# Patient Record
Sex: MALE | Race: WHITE | Employment: OTHER | URBAN - METROPOLITAN AREA
[De-identification: names, ages, dates, MRNs, and addresses within clinical notes are randomized per-mention and may not be internally consistent; named-entity substitution may affect disease eponyms.]

---

## 2017-01-03 ENCOUNTER — OFFICE VISIT (OUTPATIENT)
Dept: RHEUMATOLOGY | Facility: CLINIC | Age: 77
End: 2017-01-03

## 2017-01-03 VITALS
DIASTOLIC BLOOD PRESSURE: 67 MMHG | BODY MASS INDEX: 25.18 KG/M2 | HEART RATE: 81 BPM | HEIGHT: 69 IN | WEIGHT: 170 LBS | SYSTOLIC BLOOD PRESSURE: 116 MMHG

## 2017-01-03 DIAGNOSIS — Z51.81 ENCOUNTER FOR THERAPEUTIC DRUG MONITORING: ICD-10-CM

## 2017-01-03 DIAGNOSIS — M05.79 SEROPOSITIVE RHEUMATOID ARTHRITIS OF MULTIPLE SITES (HCC): Primary | ICD-10-CM

## 2017-01-03 DIAGNOSIS — A04.72 C. DIFFICILE ENTERITIS: ICD-10-CM

## 2017-01-03 PROCEDURE — 99214 OFFICE O/P EST MOD 30 MIN: CPT | Performed by: INTERNAL MEDICINE

## 2017-01-03 PROCEDURE — G0463 HOSPITAL OUTPT CLINIC VISIT: HCPCS | Performed by: INTERNAL MEDICINE

## 2017-01-03 RX ORDER — PREDNISONE 1 MG/1
TABLET ORAL
Qty: 180 TABLET | Refills: 3 | COMMUNITY
Start: 2017-01-03 | End: 2017-01-03

## 2017-01-03 RX ORDER — PREDNISONE 1 MG/1
TABLET ORAL
Qty: 180 TABLET | Refills: 3 | Status: SHIPPED | OUTPATIENT
Start: 2017-01-03 | End: 2018-03-17

## 2017-01-03 NOTE — PROGRESS NOTES
HPI:    Patient ID: Angelina Pope is a 64year old male. HPI Comments: Amilcar Terry is a 77-year-old patient of Dr. Reece Denton with RF positive rheumatoid arthritis.  Since I saw him October 5th of 2016 he has remained on methotrexate 5 mg weekly and 2 mg of mg total) by mouth daily. Disp: 30 tablet Rfl: 11   FLUOXETINE HCL 10 MG Oral Cap TAKE 1 CAPSULE BY MOUTH EVERY DAY Disp: 90 capsule Rfl: 3   OCUVITE (OCUVITE) Oral Tab Take  by mouth.  take 1 by Oral route  every day Disp:  Rfl:    Atorvastatin Calcium (LI lower than usual.   4. Anemia. As above. Stable.           Meds This Visit:  No prescriptions requested or ordered in this encounter       Imaging & Referrals:  None       #[id number]

## 2017-01-12 ENCOUNTER — LAB ENCOUNTER (OUTPATIENT)
Dept: LAB | Age: 77
End: 2017-01-12
Attending: INTERNAL MEDICINE
Payer: MEDICARE

## 2017-01-12 ENCOUNTER — MOBILE ENCOUNTER (OUTPATIENT)
Dept: INTERNAL MEDICINE CLINIC | Facility: CLINIC | Age: 77
End: 2017-01-12

## 2017-01-12 ENCOUNTER — OFFICE VISIT (OUTPATIENT)
Dept: INTERNAL MEDICINE CLINIC | Facility: CLINIC | Age: 77
End: 2017-01-12

## 2017-01-12 ENCOUNTER — ANTI-COAG VISIT (OUTPATIENT)
Dept: INTERNAL MEDICINE CLINIC | Facility: CLINIC | Age: 77
End: 2017-01-12

## 2017-01-12 VITALS
BODY MASS INDEX: 25 KG/M2 | OXYGEN SATURATION: 98 % | HEART RATE: 68 BPM | SYSTOLIC BLOOD PRESSURE: 120 MMHG | TEMPERATURE: 98 F | WEIGHT: 168 LBS | DIASTOLIC BLOOD PRESSURE: 70 MMHG

## 2017-01-12 DIAGNOSIS — M05.79 SEROPOSITIVE RHEUMATOID ARTHRITIS OF MULTIPLE SITES (HCC): ICD-10-CM

## 2017-01-12 DIAGNOSIS — N28.9 RENAL INSUFFICIENCY: ICD-10-CM

## 2017-01-12 DIAGNOSIS — I10 ESSENTIAL HYPERTENSION: ICD-10-CM

## 2017-01-12 DIAGNOSIS — Z95.810 ICD (IMPLANTABLE CARDIOVERTER-DEFIBRILLATOR) IN PLACE: ICD-10-CM

## 2017-01-12 DIAGNOSIS — I42.9 CARDIOMYOPATHY (HCC): ICD-10-CM

## 2017-01-12 DIAGNOSIS — D69.6 THROMBOCYTOPENIA (HCC): ICD-10-CM

## 2017-01-12 DIAGNOSIS — R53.83 FATIGUE, UNSPECIFIED TYPE: ICD-10-CM

## 2017-01-12 DIAGNOSIS — E78.00 HYPERCHOLESTEREMIA: ICD-10-CM

## 2017-01-12 DIAGNOSIS — Z79.01 ANTICOAGULATED ON COUMADIN: ICD-10-CM

## 2017-01-12 DIAGNOSIS — A04.72 C. DIFFICILE ENTERITIS: ICD-10-CM

## 2017-01-12 DIAGNOSIS — I42.9 CARDIOMYOPATHY, UNSPECIFIED TYPE (HCC): Primary | ICD-10-CM

## 2017-01-12 LAB
ANION GAP SERPL CALC-SCNC: 8 MMOL/L (ref 0–18)
BASOPHILS # BLD: 0 K/UL (ref 0–0.2)
BASOPHILS NFR BLD: 1 %
BUN SERPL-MCNC: 23 MG/DL (ref 8–20)
BUN/CREAT SERPL: 16.3 (ref 10–20)
CALCIUM SERPL-MCNC: 9 MG/DL (ref 8.5–10.5)
CHLORIDE SERPL-SCNC: 104 MMOL/L (ref 95–110)
CO2 SERPL-SCNC: 26 MMOL/L (ref 22–32)
CREAT SERPL-MCNC: 1.41 MG/DL (ref 0.5–1.5)
EOSINOPHIL # BLD: 0.1 K/UL (ref 0–0.7)
EOSINOPHIL NFR BLD: 1 %
ERYTHROCYTE [DISTWIDTH] IN BLOOD BY AUTOMATED COUNT: 15.6 % (ref 11–15)
GLUCOSE SERPL-MCNC: 90 MG/DL (ref 70–99)
HCT VFR BLD AUTO: 38 % (ref 41–52)
HGB BLD-MCNC: 12.8 G/DL (ref 13.5–17.5)
INR BLD: 9.2 (ref 0.9–1.2)
LYMPHOCYTES # BLD: 0.5 K/UL (ref 1–4)
LYMPHOCYTES NFR BLD: 8 %
MCH RBC QN AUTO: 31.4 PG (ref 27–32)
MCHC RBC AUTO-ENTMCNC: 33.7 G/DL (ref 32–37)
MCV RBC AUTO: 93.3 FL (ref 80–100)
MONOCYTES # BLD: 0.7 K/UL (ref 0–1)
MONOCYTES NFR BLD: 11 %
NEUTROPHILS # BLD AUTO: 5.2 K/UL (ref 1.8–7.7)
NEUTROPHILS NFR BLD: 80 %
OSMOLALITY UR CALC.SUM OF ELEC: 289 MOSM/KG (ref 275–295)
PLATELET # BLD AUTO: 134 K/UL (ref 140–400)
PMV BLD AUTO: 8.8 FL (ref 7.4–10.3)
POTASSIUM SERPL-SCNC: 4.9 MMOL/L (ref 3.3–5.1)
PROTHROMBIN TIME: 75.6 SECONDS (ref 11.8–14.5)
RBC # BLD AUTO: 4.07 M/UL (ref 4.5–5.9)
SODIUM SERPL-SCNC: 138 MMOL/L (ref 136–144)
WBC # BLD AUTO: 6.5 K/UL (ref 4–11)

## 2017-01-12 PROCEDURE — 99214 OFFICE O/P EST MOD 30 MIN: CPT | Performed by: INTERNAL MEDICINE

## 2017-01-12 PROCEDURE — 85610 PROTHROMBIN TIME: CPT

## 2017-01-12 PROCEDURE — G0463 HOSPITAL OUTPT CLINIC VISIT: HCPCS | Performed by: INTERNAL MEDICINE

## 2017-01-12 PROCEDURE — 36415 COLL VENOUS BLD VENIPUNCTURE: CPT

## 2017-01-12 PROCEDURE — 85025 COMPLETE CBC W/AUTO DIFF WBC: CPT

## 2017-01-12 PROCEDURE — 80048 BASIC METABOLIC PNL TOTAL CA: CPT

## 2017-01-12 NOTE — PATIENT INSTRUCTIONS
1.  Patient is to continue his current diet, medications and activity. 2.  Patient is to have an INR tonight is recommended by the [facility], [facility].   3.  Patient is to be in contact with [facility] in the [facility] regarding his INR and C

## 2017-01-12 NOTE — PROGRESS NOTES
Ann Holley is a 64year old male. Patient presents with:  Checkup: 2 days in [facility] over [place] - dx: c.diff, allergic reaction to losartan - d/c's losartan, replaced with hydralazine. Diarrhea has resolved.  Should losartan be added to allergy list? R total) by mouth daily. Disp: 30 tablet Rfl: 11   FLUOXETINE HCL 10 MG Oral Cap TAKE 1 CAPSULE BY MOUTH EVERY DAY Disp: 90 capsule Rfl: 3   OCUVITE (OCUVITE) Oral Tab Take  by mouth.  take 1 by Oral route  every day Disp:  Rfl:    Atorvastatin Calcium (LIPIT Eyes are normal  NECK: supple,no lymphadenopathy or masses, no bruits  CHEST: Will develop male.   LUNGS: clear to auscultation  CARDIO: RRR, normal S1S2, without murmur   GI:Protuberant, BS are present, no organomegaly or palpable masses  EXTREMITIES: no e

## 2017-01-13 ENCOUNTER — TELEPHONE (OUTPATIENT)
Dept: INTERNAL MEDICINE CLINIC | Facility: CLINIC | Age: 77
End: 2017-01-13

## 2017-01-13 DIAGNOSIS — N18.9 ANEMIA IN CHRONIC KIDNEY DISEASE(285.21): Primary | ICD-10-CM

## 2017-01-13 DIAGNOSIS — D63.1 ANEMIA IN CHRONIC KIDNEY DISEASE(285.21): Primary | ICD-10-CM

## 2017-01-17 ENCOUNTER — ANTI-COAG VISIT (OUTPATIENT)
Dept: INTERNAL MEDICINE CLINIC | Facility: CLINIC | Age: 77
End: 2017-01-17

## 2017-01-17 DIAGNOSIS — I42.9 CARDIOMYOPATHY (HCC): ICD-10-CM

## 2017-01-17 LAB — INR: 1.7 (ref 0.8–1.2)

## 2017-01-17 PROCEDURE — G0463 HOSPITAL OUTPT CLINIC VISIT: HCPCS

## 2017-01-17 PROCEDURE — 36416 COLLJ CAPILLARY BLOOD SPEC: CPT

## 2017-01-17 PROCEDURE — 85610 PROTHROMBIN TIME: CPT

## 2017-01-24 ENCOUNTER — PATIENT MESSAGE (OUTPATIENT)
Dept: INTERNAL MEDICINE CLINIC | Facility: CLINIC | Age: 77
End: 2017-01-24

## 2017-01-25 NOTE — TELEPHONE ENCOUNTER
From: Frederick Cabrera  To: Jeannine Aguirre MD  Sent: 1/24/2017 10:16 AM CST  Subject: Other    Hi Dr. Carl Phelan,    My Dad's (Tay Edwards) eye doctor (Dr. Drake Hoyos) wants to do an eye angiogram in my dads eye to check his macular degeneration.  I believe th

## 2017-01-26 RX ORDER — HYDRALAZINE HYDROCHLORIDE 25 MG/1
25 TABLET, FILM COATED ORAL 2 TIMES DAILY
Qty: 60 TABLET | Refills: 11 | Status: SHIPPED | OUTPATIENT
Start: 2017-01-26 | End: 2018-02-07

## 2017-01-27 NOTE — TELEPHONE ENCOUNTER
Noted.  I have approved this medication and sent into the patient's pharmacy. Please notify the patient this is been done. I will route this to nursing.   Thank you!!

## 2017-01-27 NOTE — TELEPHONE ENCOUNTER
Discussed with patient's daughter. I believe they can proceed with the angiogram of his eyes which I think is going to be a fluorescein angiogram that ophthalmologist often performed. He should be able to tolerate this.   Daughter is indicated that after

## 2017-02-01 ENCOUNTER — ANTI-COAG VISIT (OUTPATIENT)
Dept: INTERNAL MEDICINE CLINIC | Facility: CLINIC | Age: 77
End: 2017-02-01

## 2017-02-01 DIAGNOSIS — I42.9 CARDIOMYOPATHY (HCC): ICD-10-CM

## 2017-02-01 LAB
INR: 3 (ref 0.8–1.2)
TEST STRIP LOT #: ABNORMAL NUMERIC

## 2017-02-01 PROCEDURE — 85610 PROTHROMBIN TIME: CPT

## 2017-02-01 PROCEDURE — G0463 HOSPITAL OUTPT CLINIC VISIT: HCPCS

## 2017-02-01 PROCEDURE — 36416 COLLJ CAPILLARY BLOOD SPEC: CPT

## 2017-02-07 ENCOUNTER — PATIENT MESSAGE (OUTPATIENT)
Dept: INTERNAL MEDICINE CLINIC | Facility: CLINIC | Age: 77
End: 2017-02-07

## 2017-02-09 NOTE — TELEPHONE ENCOUNTER
From: Dominique Edouard  Sent: 2/9/2017 2:59 PM CST  To: [facility]  Subject: RE: Coumadin visit    Thanks Yair Lin.  I saw the note you gave him and got him squared away.     ----- Message -----  From: Kirstin Kolb, PharmD  Sent: 2/7/17, 9:26

## 2017-02-10 ENCOUNTER — LAB ENCOUNTER (OUTPATIENT)
Dept: LAB | Age: 77
End: 2017-02-10
Attending: INTERNAL MEDICINE
Payer: MEDICARE

## 2017-02-10 DIAGNOSIS — N18.9 ANEMIA IN CHRONIC KIDNEY DISEASE(285.21): ICD-10-CM

## 2017-02-10 DIAGNOSIS — D63.1 ANEMIA IN CHRONIC KIDNEY DISEASE(285.21): ICD-10-CM

## 2017-02-10 LAB
ANION GAP SERPL CALC-SCNC: 9 MMOL/L (ref 0–18)
BASOPHILS # BLD: 0 K/UL (ref 0–0.2)
BASOPHILS NFR BLD: 0 %
BUN SERPL-MCNC: 20 MG/DL (ref 8–20)
BUN/CREAT SERPL: 14.5 (ref 10–20)
CALCIUM SERPL-MCNC: 9.2 MG/DL (ref 8.5–10.5)
CHLORIDE SERPL-SCNC: 100 MMOL/L (ref 95–110)
CO2 SERPL-SCNC: 29 MMOL/L (ref 22–32)
CREAT SERPL-MCNC: 1.38 MG/DL (ref 0.5–1.5)
EOSINOPHIL # BLD: 0.2 K/UL (ref 0–0.7)
EOSINOPHIL NFR BLD: 3 %
ERYTHROCYTE [DISTWIDTH] IN BLOOD BY AUTOMATED COUNT: 16.3 % (ref 11–15)
GLUCOSE SERPL-MCNC: 74 MG/DL (ref 70–99)
HCT VFR BLD AUTO: 35.4 % (ref 41–52)
HGB BLD-MCNC: 11.9 G/DL (ref 13.5–17.5)
LYMPHOCYTES # BLD: 0.4 K/UL (ref 1–4)
LYMPHOCYTES NFR BLD: 6 %
MCH RBC QN AUTO: 31.7 PG (ref 27–32)
MCHC RBC AUTO-ENTMCNC: 33.6 G/DL (ref 32–37)
MCV RBC AUTO: 94.4 FL (ref 80–100)
MONOCYTES # BLD: 0.7 K/UL (ref 0–1)
MONOCYTES NFR BLD: 12 %
NEUTROPHILS # BLD AUTO: 4.9 K/UL (ref 1.8–7.7)
NEUTROPHILS NFR BLD: 79 %
OSMOLALITY UR CALC.SUM OF ELEC: 287 MOSM/KG (ref 275–295)
PLATELET # BLD AUTO: 108 K/UL (ref 140–400)
PMV BLD AUTO: 8.7 FL (ref 7.4–10.3)
POTASSIUM SERPL-SCNC: 3.8 MMOL/L (ref 3.3–5.1)
RBC # BLD AUTO: 3.75 M/UL (ref 4.5–5.9)
SODIUM SERPL-SCNC: 138 MMOL/L (ref 136–144)
WBC # BLD AUTO: 6.3 K/UL (ref 4–11)

## 2017-02-10 PROCEDURE — 36415 COLL VENOUS BLD VENIPUNCTURE: CPT

## 2017-02-10 PROCEDURE — 85025 COMPLETE CBC W/AUTO DIFF WBC: CPT

## 2017-02-10 PROCEDURE — 80048 BASIC METABOLIC PNL TOTAL CA: CPT

## 2017-02-13 ENCOUNTER — OFFICE VISIT (OUTPATIENT)
Dept: INTERNAL MEDICINE CLINIC | Facility: CLINIC | Age: 77
End: 2017-02-13

## 2017-02-13 VITALS
WEIGHT: 175.63 LBS | BODY MASS INDEX: 26.01 KG/M2 | HEIGHT: 69 IN | TEMPERATURE: 98 F | OXYGEN SATURATION: 98 % | SYSTOLIC BLOOD PRESSURE: 130 MMHG | HEART RATE: 72 BPM | DIASTOLIC BLOOD PRESSURE: 60 MMHG

## 2017-02-13 DIAGNOSIS — I10 ESSENTIAL HYPERTENSION: ICD-10-CM

## 2017-02-13 DIAGNOSIS — N18.30 CKD (CHRONIC KIDNEY DISEASE), STAGE III (HCC): ICD-10-CM

## 2017-02-13 DIAGNOSIS — N18.9 ANEMIA IN CKD (CHRONIC KIDNEY DISEASE): ICD-10-CM

## 2017-02-13 DIAGNOSIS — D69.6 THROMBOCYTOPENIA (HCC): ICD-10-CM

## 2017-02-13 DIAGNOSIS — N28.9 RENAL INSUFFICIENCY: ICD-10-CM

## 2017-02-13 DIAGNOSIS — Z79.01 ANTICOAGULATED ON COUMADIN: ICD-10-CM

## 2017-02-13 DIAGNOSIS — Z95.810 ICD (IMPLANTABLE CARDIOVERTER-DEFIBRILLATOR) IN PLACE: ICD-10-CM

## 2017-02-13 DIAGNOSIS — M05.79 SEROPOSITIVE RHEUMATOID ARTHRITIS OF MULTIPLE SITES (HCC): ICD-10-CM

## 2017-02-13 DIAGNOSIS — D63.1 ANEMIA IN CKD (CHRONIC KIDNEY DISEASE): ICD-10-CM

## 2017-02-13 DIAGNOSIS — E78.00 HYPERCHOLESTEREMIA: ICD-10-CM

## 2017-02-13 DIAGNOSIS — I42.9 CARDIOMYOPATHY, UNSPECIFIED TYPE (HCC): Primary | ICD-10-CM

## 2017-02-13 DIAGNOSIS — R53.83 FATIGUE, UNSPECIFIED TYPE: ICD-10-CM

## 2017-02-13 PROCEDURE — G0463 HOSPITAL OUTPT CLINIC VISIT: HCPCS | Performed by: INTERNAL MEDICINE

## 2017-02-13 PROCEDURE — 99214 OFFICE O/P EST MOD 30 MIN: CPT | Performed by: INTERNAL MEDICINE

## 2017-02-13 RX ORDER — WARFARIN SODIUM 5 MG/1
5 TABLET ORAL AS DIRECTED
COMMUNITY
End: 2017-03-08

## 2017-02-13 NOTE — PATIENT INSTRUCTIONS
1.  Patient is to continue his current diet, medications and activity. 2.  I will plan to see the patient back in 2 months with a CBC, BMP, lipid panel, AST and ALT. 3.  I will see the patient back sooner as necessary.

## 2017-02-13 NOTE — PROGRESS NOTES
Charlene Fry is a 64year old male. Patient presents with:  Checkup: 1 month check up  Cardiomyopathy  Fatigue    HPI:   Patient presents with:  Checkup: 1 month check up  Cardiomyopathy  Fatigue    Pt feels well. Pt's daughters are checking on pt.   His and ureter    • Renal insufficiency    • Pure hypercholesterolemia    • Anemia    • CHF (congestive heart failure) (HCC)    • Presence of permanent cardiac pacemaker    • High triglycerides       Social History:    Smoking Status: Former Smoker above.    4. Seropositive rheumatoid arthritis of multiple sites ([facility] [address]  Stable. CPM.  As above. 5. Anticoagulated on Coumadin  Stable. CPM.  As above. Patient follows up with Vangie Edmonds in the [facility]. 6. Thrombocytopenia (HCC)  Stable.   C

## 2017-02-15 ENCOUNTER — ANTI-COAG VISIT (OUTPATIENT)
Dept: INTERNAL MEDICINE CLINIC | Facility: CLINIC | Age: 77
End: 2017-02-15

## 2017-02-15 DIAGNOSIS — I42.9 CARDIOMYOPATHY (HCC): ICD-10-CM

## 2017-02-15 LAB — INR: 2.4 (ref 0.8–1.2)

## 2017-02-15 PROCEDURE — 36416 COLLJ CAPILLARY BLOOD SPEC: CPT

## 2017-02-15 PROCEDURE — 85610 PROTHROMBIN TIME: CPT

## 2017-02-15 PROCEDURE — G0463 HOSPITAL OUTPT CLINIC VISIT: HCPCS

## 2017-03-01 ENCOUNTER — ANTI-COAG VISIT (OUTPATIENT)
Dept: INTERNAL MEDICINE CLINIC | Facility: CLINIC | Age: 77
End: 2017-03-01

## 2017-03-01 ENCOUNTER — PATIENT MESSAGE (OUTPATIENT)
Dept: INTERNAL MEDICINE CLINIC | Facility: CLINIC | Age: 77
End: 2017-03-01

## 2017-03-01 DIAGNOSIS — I42.9 CARDIOMYOPATHY (HCC): ICD-10-CM

## 2017-03-01 LAB — INR: 2.4 (ref 0.8–1.2)

## 2017-03-01 PROCEDURE — G0463 HOSPITAL OUTPT CLINIC VISIT: HCPCS

## 2017-03-01 PROCEDURE — 85610 PROTHROMBIN TIME: CPT

## 2017-03-01 PROCEDURE — 36416 COLLJ CAPILLARY BLOOD SPEC: CPT

## 2017-03-07 ENCOUNTER — PRIOR ORIGINAL RECORDS (OUTPATIENT)
Dept: OTHER | Age: 77
End: 2017-03-07

## 2017-03-07 ENCOUNTER — PATIENT MESSAGE (OUTPATIENT)
Dept: INTERNAL MEDICINE CLINIC | Facility: CLINIC | Age: 77
End: 2017-03-07

## 2017-03-07 ENCOUNTER — TELEPHONE (OUTPATIENT)
Dept: INTERNAL MEDICINE CLINIC | Facility: CLINIC | Age: 77
End: 2017-03-07

## 2017-03-07 NOTE — TELEPHONE ENCOUNTER
Dr. Knight Lung office received a refill request from [facility] for Warfarin 5 mg, 3 months supply. One [facility] thought we should refill.                  Tasked to Rx

## 2017-03-08 RX ORDER — WARFARIN SODIUM 5 MG/1
5 TABLET ORAL DAILY
Qty: 90 TABLET | Refills: 1 | Status: SHIPPED | OUTPATIENT
Start: 2017-03-08 | End: 2017-09-05

## 2017-03-09 NOTE — TELEPHONE ENCOUNTER
From: Terrell Veloz  To: Nasreen Jefferson MD  Sent: 3/7/2017 6:41 PM CST  Subject: Prescription Question    Hi,    My dads Coumadin needs to be refilled. Can someone please call [facility] to refill this? I will call again on Wednesday. Thanks!   Candice

## 2017-03-20 NOTE — TELEPHONE ENCOUNTER
LOV: 1-3-17  Last Refilled:#26, 3rfs 1/3/17  Labs:AST 23 12/30/16  Future Appointments  Date Time Provider Yobani Trevino   3/22/2017 10:45 AM [facility] PHARMACIST NEIL Le   4/18/2017 1:30 PM Victorino Centeno MD [address] [place] [address] HEM ONC EMO   4/19/2017 11:00

## 2017-03-22 ENCOUNTER — ANTI-COAG VISIT (OUTPATIENT)
Dept: INTERNAL MEDICINE CLINIC | Facility: CLINIC | Age: 77
End: 2017-03-22

## 2017-03-22 DIAGNOSIS — I42.9 CARDIOMYOPATHY (HCC): ICD-10-CM

## 2017-03-22 LAB
INR: 3.9 (ref 0.8–1.2)
TEST STRIP EXPIRATION DATE: ABNORMAL DATE

## 2017-03-22 PROCEDURE — G0463 HOSPITAL OUTPT CLINIC VISIT: HCPCS

## 2017-03-22 PROCEDURE — 85610 PROTHROMBIN TIME: CPT

## 2017-03-22 PROCEDURE — 36416 COLLJ CAPILLARY BLOOD SPEC: CPT

## 2017-04-05 ENCOUNTER — ANTI-COAG VISIT (OUTPATIENT)
Dept: INTERNAL MEDICINE CLINIC | Facility: CLINIC | Age: 77
End: 2017-04-05

## 2017-04-05 DIAGNOSIS — I42.9 CARDIOMYOPATHY (HCC): ICD-10-CM

## 2017-04-05 PROCEDURE — 85610 PROTHROMBIN TIME: CPT

## 2017-04-05 PROCEDURE — G0463 HOSPITAL OUTPT CLINIC VISIT: HCPCS

## 2017-04-05 PROCEDURE — 36416 COLLJ CAPILLARY BLOOD SPEC: CPT

## 2017-04-18 ENCOUNTER — TELEPHONE (OUTPATIENT)
Dept: RHEUMATOLOGY | Facility: CLINIC | Age: 77
End: 2017-04-18

## 2017-04-18 ENCOUNTER — APPOINTMENT (OUTPATIENT)
Dept: HEMATOLOGY/ONCOLOGY | Facility: HOSPITAL | Age: 77
End: 2017-04-18
Attending: INTERNAL MEDICINE
Payer: MEDICARE

## 2017-04-18 RX ORDER — PREDNISONE 1 MG/1
TABLET ORAL
Qty: 21 TABLET | Refills: 0 | Status: SHIPPED | OUTPATIENT
Start: 2017-04-18 | End: 2017-11-06 | Stop reason: ALTCHOICE

## 2017-04-18 NOTE — TELEPHONE ENCOUNTER
Per Emmanuel Clayton daughter, pt is having flare up in his shoulder and arms would like to know if they can put up or increase the dose of his Prednisone? Pls advise.

## 2017-04-18 NOTE — TELEPHONE ENCOUNTER
Left message for pt. 's daughter  - will send in prednisone burst at 30mg - taper by 5mg every day -   - then talked to pt.s daughter - on her cell phone - her dad just woke up and he's a little better. If pt. Is doing ok, then 10mg x 1 week.  If he is only

## 2017-04-18 NOTE — TELEPHONE ENCOUNTER
Spoke with Gloria Perez who reports her Dad is having a flare up of RA that started on Friday. She reports pain was present on Friday in right arm, moved to L arm on Sunday and is now in both arms, shoulder and left thumb.  He is maintained on 2 MG Prednisone daily

## 2017-04-19 ENCOUNTER — ANTI-COAG VISIT (OUTPATIENT)
Dept: INTERNAL MEDICINE CLINIC | Facility: CLINIC | Age: 77
End: 2017-04-19

## 2017-04-19 ENCOUNTER — OFFICE VISIT (OUTPATIENT)
Dept: INTERNAL MEDICINE CLINIC | Facility: CLINIC | Age: 77
End: 2017-04-19

## 2017-04-19 VITALS
SYSTOLIC BLOOD PRESSURE: 130 MMHG | WEIGHT: 170.38 LBS | OXYGEN SATURATION: 98 % | HEIGHT: 69 IN | DIASTOLIC BLOOD PRESSURE: 70 MMHG | HEART RATE: 68 BPM | TEMPERATURE: 98 F | BODY MASS INDEX: 25.23 KG/M2

## 2017-04-19 DIAGNOSIS — N18.9 ANEMIA IN CKD (CHRONIC KIDNEY DISEASE): ICD-10-CM

## 2017-04-19 DIAGNOSIS — I42.9 CARDIOMYOPATHY, UNSPECIFIED TYPE (HCC): Primary | ICD-10-CM

## 2017-04-19 DIAGNOSIS — I42.9 CARDIOMYOPATHY (HCC): ICD-10-CM

## 2017-04-19 DIAGNOSIS — N28.9 RENAL INSUFFICIENCY: ICD-10-CM

## 2017-04-19 DIAGNOSIS — D69.6 THROMBOCYTOPENIA (HCC): ICD-10-CM

## 2017-04-19 DIAGNOSIS — M05.79 SEROPOSITIVE RHEUMATOID ARTHRITIS OF MULTIPLE SITES (HCC): ICD-10-CM

## 2017-04-19 DIAGNOSIS — Z79.01 ANTICOAGULATED ON COUMADIN: ICD-10-CM

## 2017-04-19 DIAGNOSIS — Z95.810 ICD (IMPLANTABLE CARDIOVERTER-DEFIBRILLATOR) IN PLACE: ICD-10-CM

## 2017-04-19 DIAGNOSIS — I10 ESSENTIAL HYPERTENSION: ICD-10-CM

## 2017-04-19 DIAGNOSIS — D63.1 ANEMIA IN CKD (CHRONIC KIDNEY DISEASE): ICD-10-CM

## 2017-04-19 DIAGNOSIS — E78.00 HYPERCHOLESTEREMIA: ICD-10-CM

## 2017-04-19 DIAGNOSIS — N18.30 CKD (CHRONIC KIDNEY DISEASE), STAGE III (HCC): ICD-10-CM

## 2017-04-19 DIAGNOSIS — R53.83 FATIGUE, UNSPECIFIED TYPE: ICD-10-CM

## 2017-04-19 PROCEDURE — 36416 COLLJ CAPILLARY BLOOD SPEC: CPT

## 2017-04-19 PROCEDURE — 99214 OFFICE O/P EST MOD 30 MIN: CPT | Performed by: INTERNAL MEDICINE

## 2017-04-19 PROCEDURE — 85610 PROTHROMBIN TIME: CPT

## 2017-04-19 PROCEDURE — G0463 HOSPITAL OUTPT CLINIC VISIT: HCPCS | Performed by: INTERNAL MEDICINE

## 2017-04-19 NOTE — PATIENT INSTRUCTIONS
1.  Patient is to continue his current diet, medications and activity. 2.  Patient will have blood test taken today that were previously ordered. 3.  I will plan to see the patient back in 3 months.

## 2017-04-19 NOTE — PROGRESS NOTES
Kristian De La Cruz is a 76year old male. Patient presents with:  Checkup: 2 month. Pt states of L shoulder pain intermittent  Cardiomyopathy  Fatigue    HPI:   Patient presents with:  Checkup: 2 month.  Pt states of L shoulder pain intermittent  Cardiomyopathy tablet Rfl: 0   predniSONE 1 MG Oral Tab TAKE 2 TABLETS BY MOUTH EVERY MORNING Disp: 180 tablet Rfl: 3      Past Medical History   Diagnosis Date   • Cardiomyopathy ([facility] [address]    • Hypertension    • Encounter for long-term (current) use of other medications prior to today performed today. That will include a CBC, BMP, lipid panel, AST and ALT. 2. ICD (implantable cardioverter-defibrillator) in place  Stable. CPM.    3. Renal insufficiency  Stable. CPM.  Await blood tests as noted above.     4. Seropositi

## 2017-04-21 ENCOUNTER — LAB ENCOUNTER (OUTPATIENT)
Dept: LAB | Age: 77
End: 2017-04-21
Attending: INTERNAL MEDICINE
Payer: MEDICARE

## 2017-04-21 ENCOUNTER — PRIOR ORIGINAL RECORDS (OUTPATIENT)
Dept: OTHER | Age: 77
End: 2017-04-21

## 2017-04-21 DIAGNOSIS — N28.9 RENAL INSUFFICIENCY: ICD-10-CM

## 2017-04-21 DIAGNOSIS — R53.83 FATIGUE, UNSPECIFIED TYPE: ICD-10-CM

## 2017-04-21 DIAGNOSIS — N18.9 ANEMIA IN CKD (CHRONIC KIDNEY DISEASE): ICD-10-CM

## 2017-04-21 DIAGNOSIS — D69.6 THROMBOCYTOPENIA (HCC): ICD-10-CM

## 2017-04-21 DIAGNOSIS — E78.00 HYPERCHOLESTEREMIA: ICD-10-CM

## 2017-04-21 DIAGNOSIS — D63.1 ANEMIA IN CKD (CHRONIC KIDNEY DISEASE): ICD-10-CM

## 2017-04-21 PROCEDURE — 84460 ALANINE AMINO (ALT) (SGPT): CPT

## 2017-04-21 PROCEDURE — 80048 BASIC METABOLIC PNL TOTAL CA: CPT

## 2017-04-21 PROCEDURE — 84450 TRANSFERASE (AST) (SGOT): CPT

## 2017-04-21 PROCEDURE — 80061 LIPID PANEL: CPT

## 2017-04-21 PROCEDURE — 36415 COLL VENOUS BLD VENIPUNCTURE: CPT

## 2017-04-21 PROCEDURE — 85025 COMPLETE CBC W/AUTO DIFF WBC: CPT

## 2017-04-22 ENCOUNTER — TELEPHONE (OUTPATIENT)
Dept: INTERNAL MEDICINE CLINIC | Facility: CLINIC | Age: 77
End: 2017-04-22

## 2017-04-22 NOTE — TELEPHONE ENCOUNTER
Telephone call to patient that his recent blood test which included a CBC, BMP, lipid panel, AST and ALT have all turned out well.  CPM.

## 2017-04-27 RX ORDER — FLUOXETINE 10 MG/1
CAPSULE ORAL
Qty: 90 CAPSULE | Refills: 3 | Status: SHIPPED | OUTPATIENT
Start: 2017-04-27 | End: 2018-05-04

## 2017-05-11 ENCOUNTER — ANTI-COAG VISIT (OUTPATIENT)
Dept: INTERNAL MEDICINE CLINIC | Facility: CLINIC | Age: 77
End: 2017-05-11

## 2017-05-11 DIAGNOSIS — I42.9 CARDIOMYOPATHY (HCC): ICD-10-CM

## 2017-05-11 PROCEDURE — 36416 COLLJ CAPILLARY BLOOD SPEC: CPT

## 2017-05-11 PROCEDURE — G0463 HOSPITAL OUTPT CLINIC VISIT: HCPCS

## 2017-05-11 PROCEDURE — 85610 PROTHROMBIN TIME: CPT

## 2017-05-18 ENCOUNTER — OFFICE VISIT (OUTPATIENT)
Dept: HEMATOLOGY/ONCOLOGY | Facility: HOSPITAL | Age: 77
End: 2017-05-18
Attending: INTERNAL MEDICINE
Payer: MEDICARE

## 2017-05-18 ENCOUNTER — TELEPHONE (OUTPATIENT)
Dept: HEMATOLOGY/ONCOLOGY | Facility: HOSPITAL | Age: 77
End: 2017-05-18

## 2017-05-18 VITALS
BODY MASS INDEX: 25.62 KG/M2 | SYSTOLIC BLOOD PRESSURE: 141 MMHG | WEIGHT: 173 LBS | DIASTOLIC BLOOD PRESSURE: 71 MMHG | HEIGHT: 68.75 IN | OXYGEN SATURATION: 96 % | RESPIRATION RATE: 20 BRPM | HEART RATE: 77 BPM | TEMPERATURE: 98 F

## 2017-05-18 DIAGNOSIS — D63.1 ANEMIA IN CKD (CHRONIC KIDNEY DISEASE): ICD-10-CM

## 2017-05-18 DIAGNOSIS — D69.6 THROMBOCYTOPENIA (HCC): Primary | ICD-10-CM

## 2017-05-18 DIAGNOSIS — N18.9 ANEMIA IN CKD (CHRONIC KIDNEY DISEASE): ICD-10-CM

## 2017-05-18 DIAGNOSIS — D63.1 ANEMIA IN CKD (CHRONIC KIDNEY DISEASE): Primary | ICD-10-CM

## 2017-05-18 DIAGNOSIS — N18.9 ANEMIA IN CKD (CHRONIC KIDNEY DISEASE): Primary | ICD-10-CM

## 2017-05-18 DIAGNOSIS — D69.6 THROMBOCYTOPENIA (HCC): ICD-10-CM

## 2017-05-18 PROCEDURE — G0463 HOSPITAL OUTPT CLINIC VISIT: HCPCS | Performed by: INTERNAL MEDICINE

## 2017-05-18 PROCEDURE — 99213 OFFICE O/P EST LOW 20 MIN: CPT | Performed by: INTERNAL MEDICINE

## 2017-05-18 NOTE — TELEPHONE ENCOUNTER
Patient is to have labs prior to November appt, it looks like all orders in system will be  by then. Please check on this.

## 2017-05-18 NOTE — PROGRESS NOTES
PARMINDER Parmar Doctor is a 76year old male here for f/u of Thrombocytopenia (hcc)  (primary encounter diagnosis)  Anemia in ckd (chronic kidney disease)     Pt here for follow up. Still on MTX/prednisone and folic acid.       Some fatigue, states Denies depression.            Current Outpatient Prescriptions:  FLUOXETINE HCL 10 MG Oral Cap TAKE 1 CAPSULE BY MOUTH EVERY DAY Disp: 90 capsule Rfl: 3   predniSONE 5 MG Oral Tab Take 6 tabs x 1 day, take 5 tabs x 1 day, take 4 tabs x 1 day, take 3 • RA (rheumatoid arthritis) (HCC)    • Unspecified disorder of kidney and ureter    • Renal insufficiency    • Pure hypercholesterolemia    • Anemia    • CHF (congestive heart failure) ([facility])    • Presence of permanent cardiac pacemaker    • High triglyce Exam   Constitutional: He is oriented to person, place, and time. He appears well-developed and well-nourished. overweight   HENT:   Head: Normocephalic and atraumatic.    Right Ear: External ear normal.   Left Ear: External ear normal.   Nose: Nose beth diagnosis)  Anemia in ckd (chronic kidney disease)  Anemia: likely due to CKD stage III. Hemoglobin has improved since last visit. Renal function stable. No need for epo at this time. Consider an evaluation with nephrology due to CKD III.     Will re 138   Potassium      3.3-5.1 mmol/L 4.5 3.8 4.9   Chloride       mmol/L 102 100 104   Carbon Dioxide, Total      22-32 mmol/L 27 29 26   BUN      8-20 mg/dL 26 (H) 20 23 (H)   CREATININE      0.50-1.50 mg/dL 1.36 1.38 1.41   CALCIUM      8.5-10.5 mg/

## 2017-05-22 ENCOUNTER — OFFICE VISIT (OUTPATIENT)
Dept: SURGERY | Facility: CLINIC | Age: 77
End: 2017-05-22

## 2017-05-22 VITALS
SYSTOLIC BLOOD PRESSURE: 146 MMHG | DIASTOLIC BLOOD PRESSURE: 84 MMHG | BODY MASS INDEX: 24.77 KG/M2 | HEART RATE: 68 BPM | WEIGHT: 173 LBS | HEIGHT: 70 IN | RESPIRATION RATE: 16 BRPM

## 2017-05-22 DIAGNOSIS — N40.1 BPH WITH OBSTRUCTION/LOWER URINARY TRACT SYMPTOMS: Primary | ICD-10-CM

## 2017-05-22 DIAGNOSIS — N13.8 BPH WITH OBSTRUCTION/LOWER URINARY TRACT SYMPTOMS: Primary | ICD-10-CM

## 2017-05-22 PROCEDURE — G0463 HOSPITAL OUTPT CLINIC VISIT: HCPCS | Performed by: UROLOGY

## 2017-05-22 PROCEDURE — 99214 OFFICE O/P EST MOD 30 MIN: CPT | Performed by: UROLOGY

## 2017-05-22 RX ORDER — LOSARTAN POTASSIUM 100 MG/1
100 TABLET ORAL DAILY
COMMUNITY
Start: 2014-09-05 | End: 2018-03-01

## 2017-05-22 RX ORDER — CARVEDILOL 25 MG/1
TABLET ORAL
COMMUNITY
Start: 2015-07-13 | End: 2017-06-23

## 2017-05-22 RX ORDER — FOLIC ACID 1 MG/1
TABLET ORAL
COMMUNITY
End: 2017-06-23

## 2017-05-22 RX ORDER — PREDNISONE 1 MG/1
TABLET ORAL
COMMUNITY
End: 2017-06-23

## 2017-05-22 RX ORDER — FINASTERIDE 5 MG/1
TABLET, FILM COATED ORAL
COMMUNITY
End: 2017-06-23

## 2017-05-22 RX ORDER — ISOSORBIDE MONONITRATE 30 MG/1
TABLET, EXTENDED RELEASE ORAL
COMMUNITY
End: 2017-06-23

## 2017-05-22 RX ORDER — ATORVASTATIN CALCIUM 20 MG/1
TABLET, FILM COATED ORAL
COMMUNITY
Start: 2015-07-13 | End: 2017-06-23

## 2017-05-22 NOTE — PROGRESS NOTES
[address] Patient Status:  Outpatient    1940 MRN [id number]   Location [address] [place] [address] Attending Jeremy Murillo.  [address] Day #  PCP Jerad Rivera MD       Read Kiana is a 76 then off (Patient taking differently: Pt reports taking 5mg daily except 2.5mg two days of the week ) Disp: 21 tablet Rfl: 0   METHOTREXATE 2.5 MG Oral Tab TAKE TWO TABLETS (5MG) BYMOUTH ONCE WEEKLY.  Disp: 26 tablet Rfl: 3   Warfarin Sodium 5 MG Oral Tab T pounds.   Flank and abdomen are benign and absolutely no change from previous exam.  Penis scrotum and testicles are examined and continue normal and no change from previous exam.  Rectal exam shows normal perineum good rectal tone no rectal masses prostate alpha reductase inhibitors to form of Proscar that he has been tolerating well now just under a year from therapy.   AUA symptom score last visit at a level of 8 now with the help of therapy down to level of 3 with patient complaining of nocturia once a nig

## 2017-06-01 ENCOUNTER — ANTI-COAG VISIT (OUTPATIENT)
Dept: INTERNAL MEDICINE CLINIC | Facility: CLINIC | Age: 77
End: 2017-06-01

## 2017-06-01 ENCOUNTER — PATIENT MESSAGE (OUTPATIENT)
Dept: INTERNAL MEDICINE CLINIC | Facility: CLINIC | Age: 77
End: 2017-06-01

## 2017-06-01 DIAGNOSIS — I42.9 CARDIOMYOPATHY (HCC): ICD-10-CM

## 2017-06-01 PROCEDURE — G0463 HOSPITAL OUTPT CLINIC VISIT: HCPCS

## 2017-06-01 PROCEDURE — 85610 PROTHROMBIN TIME: CPT

## 2017-06-01 PROCEDURE — 36416 COLLJ CAPILLARY BLOOD SPEC: CPT

## 2017-06-14 NOTE — TELEPHONE ENCOUNTER
From: Michi Ledezma  Sent: 6/14/2017 10:39 AM CDT  To: [facility]  Subject: Nathaniel He! Is it possible to move dads appointment a little later in the day?  He has a podiatrist appointment that he really needs to keep, Let me know w

## 2017-06-19 LAB
ALT (SGPT): 15 U/L
AST (SGOT): 16 U/L
BUN: 26 MG/DL
CALCIUM: 8.9 MG/DL
CHLORIDE: 102 MEQ/L
CHOLESTEROL, TOTAL: 104 MG/DL
CREATININE, SERUM: 1.36 MG/DL
GLUCOSE: 84 MG/DL
GLUCOSE: 84 MG/DL
HDL CHOLESTEROL: 36 MG/DL
HEMATOCRIT: 36.1 %
HEMOGLOBIN: 12.2 G/DL
LDL CHOLESTEROL: 58 MG/DL
MCH: 31.2 PG
MCHC: 33.7 G/DL
MCV: 92.6 FL
NON-HDL CHOLESTEROL: 68 MG/DL
PLATELETS: 142 K/UL
POTASSIUM, SERUM: 4.5 MEQ/L
RED BLOOD COUNT: 3.89 X 10-6/U
SGOT (AST): 16 IU/L
SGPT (ALT): 15 IU/L
SODIUM: 137 MEQ/L
TRIGLYCERIDES: 50 MG/DL
WHITE BLOOD COUNT: 6.9 X 10-3/U

## 2017-06-20 ENCOUNTER — PRIOR ORIGINAL RECORDS (OUTPATIENT)
Dept: OTHER | Age: 77
End: 2017-06-20

## 2017-06-22 ENCOUNTER — OFFICE VISIT (OUTPATIENT)
Dept: PODIATRY CLINIC | Facility: CLINIC | Age: 77
End: 2017-06-22

## 2017-06-22 DIAGNOSIS — B35.1 ONYCHOMYCOSIS: ICD-10-CM

## 2017-06-22 DIAGNOSIS — M79.675 PAIN IN TOES OF BOTH FEET: Primary | ICD-10-CM

## 2017-06-22 DIAGNOSIS — M79.674 PAIN IN TOES OF BOTH FEET: Primary | ICD-10-CM

## 2017-06-22 PROCEDURE — 11721 DEBRIDE NAIL 6 OR MORE: CPT | Performed by: PODIATRIST

## 2017-06-22 NOTE — PROGRESS NOTES
HPI:    Patient ID: Terrell Veloz is a 76year old male. HPI  This 47-year-old male presents with his wife and daughter with recurrent pain associated with his fungus toenails.   This is long-standing chronic and frustrating for this patient and niece MG Oral Tab Take by mouth nightly. take 1 tablet (20MG)  by oral route  every day  Disp:  Rfl:    carvedilol (COREG) 25 MG Oral Tab Take  by mouth.  take 1 tablet (25MG)  by oral route 2 times every day with food Disp:  Rfl:    spironolactone (ALDACTONE) 25

## 2017-06-23 ENCOUNTER — PATIENT MESSAGE (OUTPATIENT)
Dept: INTERNAL MEDICINE CLINIC | Facility: CLINIC | Age: 77
End: 2017-06-23

## 2017-06-23 ENCOUNTER — ANTI-COAG VISIT (OUTPATIENT)
Dept: INTERNAL MEDICINE CLINIC | Facility: CLINIC | Age: 77
End: 2017-06-23

## 2017-06-23 DIAGNOSIS — I42.9 CARDIOMYOPATHY, UNSPECIFIED TYPE (HCC): Primary | ICD-10-CM

## 2017-06-23 PROCEDURE — 85610 PROTHROMBIN TIME: CPT

## 2017-06-23 PROCEDURE — 36416 COLLJ CAPILLARY BLOOD SPEC: CPT

## 2017-06-23 PROCEDURE — G0463 HOSPITAL OUTPT CLINIC VISIT: HCPCS

## 2017-06-23 RX ORDER — ATORVASTATIN CALCIUM 20 MG/1
20 TABLET, FILM COATED ORAL NIGHTLY
Qty: 1 TABLET | Refills: 0 | COMMUNITY
Start: 2017-06-23 | End: 2020-06-25

## 2017-06-23 RX ORDER — CARVEDILOL 25 MG/1
25 TABLET ORAL 2 TIMES DAILY WITH MEALS
Qty: 1 TABLET | Refills: 0 | COMMUNITY
Start: 2017-06-23 | End: 2021-01-01

## 2017-07-26 ENCOUNTER — PATIENT MESSAGE (OUTPATIENT)
Dept: INTERNAL MEDICINE CLINIC | Facility: CLINIC | Age: 77
End: 2017-07-26

## 2017-07-26 ENCOUNTER — ANTI-COAG VISIT (OUTPATIENT)
Dept: INTERNAL MEDICINE CLINIC | Facility: CLINIC | Age: 77
End: 2017-07-26

## 2017-07-26 DIAGNOSIS — I42.9 CARDIOMYOPATHY, UNSPECIFIED TYPE (HCC): ICD-10-CM

## 2017-07-26 LAB
INR: 2.4 (ref 0.8–1.2)
TEST STRIP EXPIRATION DATE: ABNORMAL DATE

## 2017-07-26 PROCEDURE — G0463 HOSPITAL OUTPT CLINIC VISIT: HCPCS

## 2017-07-26 PROCEDURE — 85610 PROTHROMBIN TIME: CPT

## 2017-07-26 PROCEDURE — 36416 COLLJ CAPILLARY BLOOD SPEC: CPT

## 2017-08-02 ENCOUNTER — PATIENT MESSAGE (OUTPATIENT)
Dept: INTERNAL MEDICINE CLINIC | Facility: CLINIC | Age: 77
End: 2017-08-02

## 2017-08-02 NOTE — TELEPHONE ENCOUNTER
From: Eloisa Bruno  To: Lili Lawson MD  Sent: 8/2/2017 1:59 PM CDT  Subject: Other    Hi Dr. Chetan Gonzalez,    I had sent the below message and haven't heard back.  Is this possible?    06/20/2017 01:39 PM  Hi Chetan Case,  My dad had a visit with

## 2017-08-04 ENCOUNTER — OFFICE VISIT (OUTPATIENT)
Dept: INTERNAL MEDICINE CLINIC | Facility: CLINIC | Age: 77
End: 2017-08-04

## 2017-08-04 VITALS
SYSTOLIC BLOOD PRESSURE: 136 MMHG | WEIGHT: 176 LBS | OXYGEN SATURATION: 97 % | BODY MASS INDEX: 25 KG/M2 | DIASTOLIC BLOOD PRESSURE: 70 MMHG | HEART RATE: 84 BPM | TEMPERATURE: 98 F

## 2017-08-04 DIAGNOSIS — I42.9 CARDIOMYOPATHY, UNSPECIFIED TYPE (HCC): Primary | ICD-10-CM

## 2017-08-04 DIAGNOSIS — E78.00 HYPERCHOLESTEREMIA: ICD-10-CM

## 2017-08-04 DIAGNOSIS — N18.30 ANEMIA IN STAGE 3 CHRONIC KIDNEY DISEASE (HCC): ICD-10-CM

## 2017-08-04 DIAGNOSIS — N28.9 RENAL INSUFFICIENCY: ICD-10-CM

## 2017-08-04 DIAGNOSIS — D69.6 THROMBOCYTOPENIA (HCC): ICD-10-CM

## 2017-08-04 DIAGNOSIS — Z79.01 ANTICOAGULATED ON COUMADIN: ICD-10-CM

## 2017-08-04 DIAGNOSIS — I10 ESSENTIAL HYPERTENSION: ICD-10-CM

## 2017-08-04 DIAGNOSIS — D63.1 ANEMIA IN STAGE 3 CHRONIC KIDNEY DISEASE (HCC): ICD-10-CM

## 2017-08-04 DIAGNOSIS — R53.83 FATIGUE, UNSPECIFIED TYPE: ICD-10-CM

## 2017-08-04 DIAGNOSIS — Z12.5 PROSTATE CANCER SCREENING: ICD-10-CM

## 2017-08-04 DIAGNOSIS — Z95.810 ICD (IMPLANTABLE CARDIOVERTER-DEFIBRILLATOR) IN PLACE: ICD-10-CM

## 2017-08-04 DIAGNOSIS — M05.79 SEROPOSITIVE RHEUMATOID ARTHRITIS OF MULTIPLE SITES (HCC): ICD-10-CM

## 2017-08-04 DIAGNOSIS — Z00.00 ANNUAL PHYSICAL EXAM: ICD-10-CM

## 2017-08-04 DIAGNOSIS — N18.30 CKD (CHRONIC KIDNEY DISEASE), STAGE III (HCC): ICD-10-CM

## 2017-08-04 PROCEDURE — 99214 OFFICE O/P EST MOD 30 MIN: CPT | Performed by: INTERNAL MEDICINE

## 2017-08-04 PROCEDURE — G0463 HOSPITAL OUTPT CLINIC VISIT: HCPCS | Performed by: INTERNAL MEDICINE

## 2017-08-04 NOTE — PATIENT INSTRUCTIONS
1.  Patient is to continue his current diet, medications and activity. 2.  I will plan to see the patient back in 3 months with blood tests, urinalysis and EKG for his annual physical examination.   The blood tests will include a CBC, CMP, lipid panel, TSH

## 2017-08-04 NOTE — PROGRESS NOTES
Agata Doctor is a 76year old male. Patient presents with:  Checkup: Feeling well. Medication Follow-Up: Pt and wife unsure of any medications - daughters handle meds.   Asked pt's wife to have daughter call on Monday to reconcile medication - understand ONE TABLET BY MOUTH EVERY DAY Disp: 90 tablet Rfl: 3   finasteride 5 MG Oral Tab Take 1 tablet (5 mg total) by mouth daily. Disp: 30 tablet Rfl: 11   OCUVITE (OCUVITE) Oral Tab Take  by mouth.  take 1 by Oral route  every day Disp:  Rfl:    spironolactone ( auscultation  CARDIO: RRR, normal S1S2, without murmur   GI:Protuberant, BS are present, no organomegaly or palpable masses  EXTREMITIES: no edema  NEURO: alert and oriented  ASSESSMENT AND PLAN:   1.  Cardiomyopathy, unspecified type [facility])  Patient appears MD  8/4/2017  3:01 PM

## 2017-08-17 ENCOUNTER — PATIENT MESSAGE (OUTPATIENT)
Dept: INTERNAL MEDICINE CLINIC | Facility: CLINIC | Age: 77
End: 2017-08-17

## 2017-08-17 ENCOUNTER — ANTI-COAG VISIT (OUTPATIENT)
Dept: INTERNAL MEDICINE CLINIC | Facility: CLINIC | Age: 77
End: 2017-08-17

## 2017-08-17 DIAGNOSIS — I42.9 CARDIOMYOPATHY, UNSPECIFIED TYPE (HCC): ICD-10-CM

## 2017-08-17 LAB
INR: 2.7 (ref 0.8–1.2)
TEST STRIP EXPIRATION DATE: ABNORMAL DATE

## 2017-08-17 PROCEDURE — 36416 COLLJ CAPILLARY BLOOD SPEC: CPT | Performed by: INTERNAL MEDICINE

## 2017-08-17 PROCEDURE — 99211 OFF/OP EST MAY X REQ PHY/QHP: CPT | Performed by: INTERNAL MEDICINE

## 2017-08-17 PROCEDURE — 85610 PROTHROMBIN TIME: CPT | Performed by: INTERNAL MEDICINE

## 2017-09-05 RX ORDER — WARFARIN SODIUM 5 MG/1
TABLET ORAL
Qty: 90 TABLET | Refills: 1 | Status: ON HOLD | OUTPATIENT
Start: 2017-09-05 | End: 2018-03-01

## 2017-09-05 RX ORDER — FINASTERIDE 5 MG/1
5 TABLET, FILM COATED ORAL DAILY
Qty: 30 TABLET | Refills: 11 | Status: SHIPPED | OUTPATIENT
Start: 2017-09-05 | End: 2018-09-05

## 2017-09-05 RX ORDER — WARFARIN SODIUM 5 MG/1
5 TABLET ORAL DAILY
Qty: 90 TABLET | Refills: 1 | Status: CANCELLED
Start: 2017-09-05

## 2017-09-07 NOTE — TELEPHONE ENCOUNTER
From: Arleen Puentes  Sent: 9/5/2017 11:17 AM CDT  Subject: Medication Renewal Request    Daniel Godinez would like a refill of the following medications:  Warfarin Sodium 5 MG Oral Tab [Pierre Rg MD]    Preferred pharmacy: [address] [place] [address] [phone] -

## 2017-09-14 ENCOUNTER — OFFICE VISIT (OUTPATIENT)
Dept: PODIATRY CLINIC | Facility: CLINIC | Age: 77
End: 2017-09-14

## 2017-09-14 ENCOUNTER — ANTI-COAG VISIT (OUTPATIENT)
Dept: INTERNAL MEDICINE CLINIC | Facility: CLINIC | Age: 77
End: 2017-09-14

## 2017-09-14 ENCOUNTER — PATIENT MESSAGE (OUTPATIENT)
Dept: INTERNAL MEDICINE CLINIC | Facility: CLINIC | Age: 77
End: 2017-09-14

## 2017-09-14 DIAGNOSIS — I42.9 CARDIOMYOPATHY, UNSPECIFIED TYPE (HCC): ICD-10-CM

## 2017-09-14 DIAGNOSIS — B35.1 ONYCHOMYCOSIS: ICD-10-CM

## 2017-09-14 DIAGNOSIS — M79.675 PAIN IN TOES OF BOTH FEET: Primary | ICD-10-CM

## 2017-09-14 DIAGNOSIS — M79.674 PAIN IN TOES OF BOTH FEET: Primary | ICD-10-CM

## 2017-09-14 LAB
INR: 2.8 (ref 0.8–1.2)
TEST STRIP EXPIRATION DATE: ABNORMAL DATE

## 2017-09-14 PROCEDURE — G0463 HOSPITAL OUTPT CLINIC VISIT: HCPCS

## 2017-09-14 PROCEDURE — 11721 DEBRIDE NAIL 6 OR MORE: CPT | Performed by: PODIATRIST

## 2017-09-14 PROCEDURE — 36416 COLLJ CAPILLARY BLOOD SPEC: CPT

## 2017-09-14 PROCEDURE — 90653 IIV ADJUVANT VACCINE IM: CPT

## 2017-09-14 PROCEDURE — 85610 PROTHROMBIN TIME: CPT

## 2017-09-14 PROCEDURE — G0008 ADMIN INFLUENZA VIRUS VAC: HCPCS

## 2017-09-14 NOTE — PROGRESS NOTES
HPI:    Patient ID: Kristian De La Cruz is a 76year old male. HPI  This 38-year-old male presents with recurrent pain associated with his fungus toenails. He reports relief by previous care.   Review of Systems  I did review present medical status, medicat Allergies:  Losartan                Swelling    Comment:Pt developed angioedema of his mouth. Lisinopril                  Comment:Other reaction(s): angioedema   PHYSICAL EXAM:   Physical Exam  On physical exam pedal pulses are noted.   There is no josefa

## 2017-09-18 ENCOUNTER — MYAURORA ACCOUNT LINK (OUTPATIENT)
Dept: OTHER | Age: 77
End: 2017-09-18

## 2017-09-18 ENCOUNTER — PRIOR ORIGINAL RECORDS (OUTPATIENT)
Dept: OTHER | Age: 77
End: 2017-09-18

## 2017-10-12 ENCOUNTER — PATIENT MESSAGE (OUTPATIENT)
Dept: INTERNAL MEDICINE CLINIC | Facility: CLINIC | Age: 77
End: 2017-10-12

## 2017-10-12 ENCOUNTER — ANTI-COAG VISIT (OUTPATIENT)
Dept: INTERNAL MEDICINE CLINIC | Facility: CLINIC | Age: 77
End: 2017-10-12

## 2017-10-12 DIAGNOSIS — I42.9 CARDIOMYOPATHY, UNSPECIFIED TYPE (HCC): ICD-10-CM

## 2017-10-12 PROCEDURE — 36416 COLLJ CAPILLARY BLOOD SPEC: CPT

## 2017-10-12 PROCEDURE — 85610 PROTHROMBIN TIME: CPT

## 2017-11-03 RX ORDER — FOLIC ACID 1 MG/1
TABLET ORAL
Qty: 90 TABLET | Refills: 3 | Status: SHIPPED | OUTPATIENT
Start: 2017-11-03 | End: 2018-11-26

## 2017-11-03 NOTE — TELEPHONE ENCOUNTER
LOV:1-3  Last Filled:10-18-16, #90 with 3 refills  Labs:   Future Appointments  Date Time Provider Yobani Trevino   11/6/2017 2:30 PM Margarita Howard MD [facility]   11/13/2017 1:20 PM Tona Marcial MD [facility]   12/4/2017 11:00 AM

## 2017-11-06 ENCOUNTER — OFFICE VISIT (OUTPATIENT)
Dept: INTERNAL MEDICINE CLINIC | Facility: CLINIC | Age: 77
End: 2017-11-06

## 2017-11-06 VITALS
WEIGHT: 173.63 LBS | OXYGEN SATURATION: 98 % | BODY MASS INDEX: 24.86 KG/M2 | HEART RATE: 72 BPM | TEMPERATURE: 98 F | DIASTOLIC BLOOD PRESSURE: 70 MMHG | HEIGHT: 70 IN | SYSTOLIC BLOOD PRESSURE: 140 MMHG

## 2017-11-06 DIAGNOSIS — E78.00 HYPERCHOLESTEREMIA: ICD-10-CM

## 2017-11-06 DIAGNOSIS — I10 ESSENTIAL HYPERTENSION: ICD-10-CM

## 2017-11-06 DIAGNOSIS — N18.30 ANEMIA IN STAGE 3 CHRONIC KIDNEY DISEASE (HCC): ICD-10-CM

## 2017-11-06 DIAGNOSIS — D69.6 THROMBOCYTOPENIA (HCC): ICD-10-CM

## 2017-11-06 DIAGNOSIS — Z95.810 ICD (IMPLANTABLE CARDIOVERTER-DEFIBRILLATOR) IN PLACE: ICD-10-CM

## 2017-11-06 DIAGNOSIS — R53.83 FATIGUE, UNSPECIFIED TYPE: ICD-10-CM

## 2017-11-06 DIAGNOSIS — D63.1 ANEMIA IN STAGE 3 CHRONIC KIDNEY DISEASE (HCC): ICD-10-CM

## 2017-11-06 DIAGNOSIS — Z79.01 ANTICOAGULATED ON COUMADIN: ICD-10-CM

## 2017-11-06 DIAGNOSIS — I42.9 CARDIOMYOPATHY, UNSPECIFIED TYPE (HCC): ICD-10-CM

## 2017-11-06 DIAGNOSIS — M05.79 SEROPOSITIVE RHEUMATOID ARTHRITIS OF MULTIPLE SITES (HCC): ICD-10-CM

## 2017-11-06 DIAGNOSIS — J44.9 CHRONIC OBSTRUCTIVE PULMONARY DISEASE, UNSPECIFIED COPD TYPE (HCC): ICD-10-CM

## 2017-11-06 DIAGNOSIS — N28.9 RENAL INSUFFICIENCY: ICD-10-CM

## 2017-11-06 DIAGNOSIS — Z00.00 ANNUAL PHYSICAL EXAM: Primary | ICD-10-CM

## 2017-11-06 DIAGNOSIS — N18.30 CKD (CHRONIC KIDNEY DISEASE), STAGE III (HCC): ICD-10-CM

## 2017-11-06 PROCEDURE — 99214 OFFICE O/P EST MOD 30 MIN: CPT | Performed by: INTERNAL MEDICINE

## 2017-11-06 PROCEDURE — 93010 ELECTROCARDIOGRAM REPORT: CPT | Performed by: INTERNAL MEDICINE

## 2017-11-06 PROCEDURE — G0463 HOSPITAL OUTPT CLINIC VISIT: HCPCS | Performed by: INTERNAL MEDICINE

## 2017-11-06 PROCEDURE — 93005 ELECTROCARDIOGRAM TRACING: CPT | Performed by: INTERNAL MEDICINE

## 2017-11-06 PROCEDURE — G0439 PPPS, SUBSEQ VISIT: HCPCS | Performed by: INTERNAL MEDICINE

## 2017-11-06 NOTE — PATIENT INSTRUCTIONS
1.  Patient is to continue his current diet, medications and activity. 2.  Patient is to continue to follow-up with his cardiologist at [facility].   3.  Patient is to have his previously ordered blood test sometime in the next 1-2 weeks as an outpatient

## 2017-11-07 NOTE — PROGRESS NOTES
Darshan Mercado is a 76year old male who presents for a complete physical exam.   HPI:   Mr. Sahra Jones is a 77-year-old white male who was seen by me on November 6, 2017 for his Medicare annual physical examination.   At the time the examination Mr. Josesito Koroma MARGARET ONCE WEEKLY. Disp: 26 tablet Rfl: 3   hydrALAzine HCl 25 MG Oral Tab Take 1 tablet (25 mg total) by mouth 2 (two) times daily.  Disp: 60 tablet Rfl: 11   predniSONE 1 MG Oral Tab TAKE 2 TABLETS BY MOUTH EVERY MORNING Disp: 180 tablet Rfl: 3   OCUVIT Comment: 0-5 beers weekly          REVIEW OF SYSTEMS:   GENERAL: feels well   EYES:denies blurred vision or double vision  HEENT: denies nasal congestion, sinus pain or ST  LUNGS: denies shortness of breath or cough  CARDIOVASCULAR: denies chest pain or pr Annual physical exam  Patient appears stable at this time. It appears that the patient's shortness of breath on exertion is related to a combination of his cardiomyopathy and COPD. Patient has a lack of cardiac reserve.   He is to follow-up with his cardi exertion. CPM.  As above.       Darlin Coffman MD  11/6/2017  8:16 PM        General Health     In the past six months, have you lost more than 10 pounds without trying?: 2 - No    Has your appetite been poor?: No    Type of Diet: Balanced    How does Mikie Yepez, RN  Screening Method:  Questionnaire  I have a problem hearing over the telephone:  Sometimes I have trouble following the conversations when two or more people are talking at the same time:  Sometimes   I have trouble understanding things on the HbgA1C   Annually No results found for: A1C No flowsheet data found.     Fasting Blood Sugar (FSB) Annually   Glucose (mg/dL)   Date Value   04/21/2017 84   02/21/2012 89   ----------  GLUCOSE (P) (mg/dL)   Date Value   06/01/2016 94   ----------    Cardiov POTASSIUM (P) (mmol/L)   Date Value   06/01/2016 5.1    No flowsheet data found.     Creatinine  Annually Creatinine, Serum (mg/dL)   Date Value   02/21/2012 1.31 (H)     Creatinine (mg/dL)   Date Value   04/21/2017 1.36     CREATININE (P) (mg/dL)   Samuel

## 2017-11-16 ENCOUNTER — APPOINTMENT (OUTPATIENT)
Dept: HEMATOLOGY/ONCOLOGY | Facility: HOSPITAL | Age: 77
End: 2017-11-16
Attending: INTERNAL MEDICINE
Payer: MEDICARE

## 2017-11-20 NOTE — TELEPHONE ENCOUNTER
Called daughter Viki Long and relayed DR. PORTER message - she verbalized understanding  - forwarded to Maranda Pagan

## 2017-11-20 NOTE — TELEPHONE ENCOUNTER
Noted.  I will forward this to [employer] to handle on Tuesday. I will forward  this to [employer] and also to nursing. Nursing please call the patient's daughter notify her that [employer] will be back tomorrow and she will handle her request at that time.   Thank y

## 2017-11-27 NOTE — TELEPHONE ENCOUNTER
To  - can you see Jacy's note in mychart message;   Mr. Dontae Dunlap is sick and his wife was positive for the flu in ER

## 2017-11-30 ENCOUNTER — ANTI-COAG VISIT (OUTPATIENT)
Dept: INTERNAL MEDICINE CLINIC | Facility: CLINIC | Age: 77
End: 2017-11-30

## 2017-11-30 DIAGNOSIS — I42.9 CARDIOMYOPATHY, UNSPECIFIED TYPE (HCC): ICD-10-CM

## 2017-11-30 PROCEDURE — G0463 HOSPITAL OUTPT CLINIC VISIT: HCPCS

## 2017-11-30 PROCEDURE — 36416 COLLJ CAPILLARY BLOOD SPEC: CPT

## 2017-11-30 PROCEDURE — 85610 PROTHROMBIN TIME: CPT

## 2017-12-02 ENCOUNTER — LAB ENCOUNTER (OUTPATIENT)
Dept: LAB | Age: 77
End: 2017-12-02
Attending: INTERNAL MEDICINE
Payer: MEDICARE

## 2017-12-02 ENCOUNTER — PRIOR ORIGINAL RECORDS (OUTPATIENT)
Dept: OTHER | Age: 77
End: 2017-12-02

## 2017-12-02 DIAGNOSIS — Z00.00 ANNUAL PHYSICAL EXAM: ICD-10-CM

## 2017-12-02 DIAGNOSIS — Z12.5 PROSTATE CANCER SCREENING: ICD-10-CM

## 2017-12-02 DIAGNOSIS — N28.9 RENAL INSUFFICIENCY: ICD-10-CM

## 2017-12-02 DIAGNOSIS — E78.00 HYPERCHOLESTEREMIA: ICD-10-CM

## 2017-12-02 DIAGNOSIS — N18.30 CKD (CHRONIC KIDNEY DISEASE), STAGE III (HCC): ICD-10-CM

## 2017-12-02 DIAGNOSIS — R53.83 FATIGUE, UNSPECIFIED TYPE: ICD-10-CM

## 2017-12-02 PROCEDURE — 84443 ASSAY THYROID STIM HORMONE: CPT

## 2017-12-02 PROCEDURE — 80053 COMPREHEN METABOLIC PANEL: CPT

## 2017-12-02 PROCEDURE — 85025 COMPLETE CBC W/AUTO DIFF WBC: CPT

## 2017-12-02 PROCEDURE — 81001 URINALYSIS AUTO W/SCOPE: CPT

## 2017-12-02 PROCEDURE — 36415 COLL VENOUS BLD VENIPUNCTURE: CPT

## 2017-12-02 PROCEDURE — 80061 LIPID PANEL: CPT

## 2017-12-04 ENCOUNTER — OFFICE VISIT (OUTPATIENT)
Dept: HEMATOLOGY/ONCOLOGY | Facility: HOSPITAL | Age: 77
End: 2017-12-04
Attending: INTERNAL MEDICINE
Payer: MEDICARE

## 2017-12-04 VITALS
RESPIRATION RATE: 18 BRPM | TEMPERATURE: 98 F | HEIGHT: 68 IN | SYSTOLIC BLOOD PRESSURE: 122 MMHG | BODY MASS INDEX: 25.61 KG/M2 | DIASTOLIC BLOOD PRESSURE: 60 MMHG | HEART RATE: 76 BPM | WEIGHT: 169 LBS

## 2017-12-04 DIAGNOSIS — D63.1 ANEMIA IN STAGE 3 CHRONIC KIDNEY DISEASE (HCC): Primary | ICD-10-CM

## 2017-12-04 DIAGNOSIS — N18.30 ANEMIA IN STAGE 3 CHRONIC KIDNEY DISEASE (HCC): Primary | ICD-10-CM

## 2017-12-04 DIAGNOSIS — D69.6 THROMBOCYTOPENIA (HCC): ICD-10-CM

## 2017-12-04 PROCEDURE — 99213 OFFICE O/P EST LOW 20 MIN: CPT | Performed by: INTERNAL MEDICINE

## 2017-12-04 PROCEDURE — G0463 HOSPITAL OUTPT CLINIC VISIT: HCPCS | Performed by: INTERNAL MEDICINE

## 2017-12-04 NOTE — PROGRESS NOTES
HPI       Jacqueline Russell is a 76year old male here for f/u of Anemia in stage 3 chronic kidney disease  (primary encounter diagnosis)  Thrombocytopenia (hcc)     Pt here for follow up. Still on MTX/prednisone and folic acid.       Some fatigue, stat prednisone). Psychiatric/Behavioral: Positive for sleep disturbance. The patient is not nervous/anxious. Denies depression.            Current Outpatient Prescriptions:  FOLIC ACID 1 MG Oral Tab TAKE ONE TABLET BY MOUTH EVERY DAY Disp: 90 tablet R medications    • High triglycerides    • Hypertension    • Presence of permanent cardiac pacemaker    • Pure hypercholesterolemia    • RA (rheumatoid arthritis) ([facility] [address]    • Renal insufficiency    • Unspecified disorder of kidney and ureter      Past Surgica oriented to person, place, and time. He appears well-developed and well-nourished. overweight   HENT:   Head: Normocephalic and atraumatic.    Right Ear: External ear normal.   Left Ear: External ear normal.   Nose: Nose normal.   Mouth/Throat: Oropharynx (hcc)  Anemia: likely due to CKD stage III. Hemoglobin has been stable in his range. .  Renal function stable. No need for epo at this time. Consider an evaluation with nephrology due to CKD III. Will repeat the CBC in 6 months.   If Hgb is stable w MCHC      32.0 - 37.0 g/dl 34.6   RDW      11.0 - 15.0 % 15.6 (H)   Platelet Count      483 - 400 K/ (L)   MEAN PLATELET VOLUME      7.4 - 10.3 fL 7.6   Neutrophils %      % 83   Lymphocytes %      % 6   Monocytes %      % 8   Eosinophils %      %

## 2017-12-07 ENCOUNTER — ANTI-COAG VISIT (OUTPATIENT)
Dept: INTERNAL MEDICINE CLINIC | Facility: CLINIC | Age: 77
End: 2017-12-07

## 2017-12-07 DIAGNOSIS — I42.9 CARDIOMYOPATHY, UNSPECIFIED TYPE (HCC): ICD-10-CM

## 2017-12-07 PROCEDURE — 36416 COLLJ CAPILLARY BLOOD SPEC: CPT

## 2017-12-07 PROCEDURE — 85610 PROTHROMBIN TIME: CPT

## 2017-12-07 PROCEDURE — G0463 HOSPITAL OUTPT CLINIC VISIT: HCPCS

## 2017-12-18 ENCOUNTER — ANTI-COAG VISIT (OUTPATIENT)
Dept: INTERNAL MEDICINE CLINIC | Facility: CLINIC | Age: 77
End: 2017-12-18

## 2017-12-18 DIAGNOSIS — I42.9 CARDIOMYOPATHY, UNSPECIFIED TYPE (HCC): ICD-10-CM

## 2017-12-18 LAB
ALBUMIN: 3.5 G/DL
ALT (SGPT): 15 U/L
AST (SGOT): 19 U/L
BILIRUBIN TOTAL: 1.8 MG/DL
BILIRUBIN TOTAL: 1.8 MG/DL
BUN: 22 MG/DL
CALCIUM: 8.9 MG/DL
CHLORIDE: 99 MEQ/L
CHOLESTEROL, TOTAL: 85 MG/DL
CREATININE, SERUM: 1.33 MG/DL
GLOBULIN: 2.7 G/DL
GLUCOSE: 91 MG/DL
GLUCOSE: 91 MG/DL
HDL CHOLESTEROL: 28 MG/DL
HEMATOCRIT: 33.6 %
HEMOGLOBIN: 11.6 G/DL
LDL CHOLESTEROL: 39 MG/DL
NON-HDL CHOLESTEROL: 57 MG/DL
PLATELETS: 122 K/UL
POTASSIUM, SERUM: 4.1 MEQ/L
PROTEIN, TOTAL: 6.2 G/DL
RED BLOOD COUNT: 3.59 X 10-6/U
SGOT (AST): 19 IU/L
SGPT (ALT): 15 IU/L
SODIUM: 134 MEQ/L
TRIGLYCERIDES: 90 MG/DL
WHITE BLOOD COUNT: 6.1 X 10-3/U

## 2017-12-18 PROCEDURE — G0463 HOSPITAL OUTPT CLINIC VISIT: HCPCS

## 2017-12-18 PROCEDURE — 85610 PROTHROMBIN TIME: CPT

## 2017-12-18 PROCEDURE — 36416 COLLJ CAPILLARY BLOOD SPEC: CPT

## 2017-12-26 ENCOUNTER — OFFICE VISIT (OUTPATIENT)
Dept: SURGERY | Facility: CLINIC | Age: 77
End: 2017-12-26

## 2017-12-26 VITALS — SYSTOLIC BLOOD PRESSURE: 110 MMHG | DIASTOLIC BLOOD PRESSURE: 64 MMHG | TEMPERATURE: 98 F

## 2017-12-26 DIAGNOSIS — N40.1 BPH WITH OBSTRUCTION/LOWER URINARY TRACT SYMPTOMS: Primary | ICD-10-CM

## 2017-12-26 DIAGNOSIS — N13.8 BPH WITH OBSTRUCTION/LOWER URINARY TRACT SYMPTOMS: Primary | ICD-10-CM

## 2017-12-26 PROCEDURE — 99214 OFFICE O/P EST MOD 30 MIN: CPT | Performed by: UROLOGY

## 2017-12-26 PROCEDURE — G0463 HOSPITAL OUTPT CLINIC VISIT: HCPCS | Performed by: UROLOGY

## 2017-12-26 NOTE — PROGRESS NOTES
[address] Patient Status:  Outpatient    1940 MRN [id number]   Location [address] [facility] Attending Kaz Ruiz.  [address] Day # 0 PCP MD Raquel Goff is a 7 mouth daily. Disp:  Rfl:    FLUOXETINE HCL 10 MG Oral Cap TAKE 1 CAPSULE BY MOUTH EVERY DAY Disp: 90 capsule Rfl: 3   METHOTREXATE 2.5 MG Oral Tab TAKE TWO TABLETS (5MG) BYMOUTH ONCE WEEKLY.  Disp: 26 tablet Rfl: 3   hydrALAzine HCl 25 MG Oral Tab Take 1 ISOSORBIDE MONONITRATE 30 MG OR TB24 2 TABLETS DAILY Disp:  Rfl:          Losartan                Swelling    Comment:Pt developed angioedema of his mouth.   Lisinopril                  Comment:Other reaction(s): angioedema   12/26/17  1320   BP: 110/64 finally followed by transrectal ultrasound prostate biopsies after temporary holding Coumadin due to nodule and was found to have a markedly enlarged prostate of 52 g normal echo architecture with nodule not discernible ultrasound nonetheless 12 biopsies t

## 2018-01-09 ENCOUNTER — ANTI-COAG VISIT (OUTPATIENT)
Dept: INTERNAL MEDICINE CLINIC | Facility: CLINIC | Age: 78
End: 2018-01-09

## 2018-01-09 DIAGNOSIS — I42.9 CARDIOMYOPATHY, UNSPECIFIED TYPE (HCC): ICD-10-CM

## 2018-01-09 LAB — INR: 1.8 (ref 0.8–1.2)

## 2018-01-09 PROCEDURE — 85610 PROTHROMBIN TIME: CPT

## 2018-01-09 PROCEDURE — 36416 COLLJ CAPILLARY BLOOD SPEC: CPT

## 2018-02-05 ENCOUNTER — OFFICE VISIT (OUTPATIENT)
Dept: INTERNAL MEDICINE CLINIC | Facility: CLINIC | Age: 78
End: 2018-02-05

## 2018-02-05 ENCOUNTER — ANTI-COAG VISIT (OUTPATIENT)
Dept: INTERNAL MEDICINE CLINIC | Facility: CLINIC | Age: 78
End: 2018-02-05

## 2018-02-05 VITALS
DIASTOLIC BLOOD PRESSURE: 64 MMHG | WEIGHT: 169.38 LBS | HEART RATE: 68 BPM | OXYGEN SATURATION: 96 % | BODY MASS INDEX: 25.67 KG/M2 | SYSTOLIC BLOOD PRESSURE: 110 MMHG | TEMPERATURE: 98 F | HEIGHT: 68 IN

## 2018-02-05 DIAGNOSIS — I42.9 CARDIOMYOPATHY, UNSPECIFIED TYPE (HCC): Primary | ICD-10-CM

## 2018-02-05 DIAGNOSIS — J43.9 PULMONARY EMPHYSEMA, UNSPECIFIED EMPHYSEMA TYPE (HCC): ICD-10-CM

## 2018-02-05 DIAGNOSIS — R53.83 FATIGUE, UNSPECIFIED TYPE: ICD-10-CM

## 2018-02-05 DIAGNOSIS — M05.79 SEROPOSITIVE RHEUMATOID ARTHRITIS OF MULTIPLE SITES (HCC): ICD-10-CM

## 2018-02-05 DIAGNOSIS — Z79.01 ANTICOAGULATED ON COUMADIN: ICD-10-CM

## 2018-02-05 DIAGNOSIS — D69.6 THROMBOCYTOPENIA (HCC): ICD-10-CM

## 2018-02-05 DIAGNOSIS — D63.1 ANEMIA IN STAGE 3 CHRONIC KIDNEY DISEASE (HCC): ICD-10-CM

## 2018-02-05 DIAGNOSIS — Z95.810 ICD (IMPLANTABLE CARDIOVERTER-DEFIBRILLATOR) IN PLACE: ICD-10-CM

## 2018-02-05 DIAGNOSIS — E78.00 HYPERCHOLESTEREMIA: ICD-10-CM

## 2018-02-05 DIAGNOSIS — N18.30 CKD (CHRONIC KIDNEY DISEASE), STAGE III (HCC): ICD-10-CM

## 2018-02-05 DIAGNOSIS — I42.9 CARDIOMYOPATHY, UNSPECIFIED TYPE (HCC): ICD-10-CM

## 2018-02-05 DIAGNOSIS — N18.30 ANEMIA IN STAGE 3 CHRONIC KIDNEY DISEASE (HCC): ICD-10-CM

## 2018-02-05 DIAGNOSIS — I10 ESSENTIAL HYPERTENSION: ICD-10-CM

## 2018-02-05 DIAGNOSIS — N28.9 RENAL INSUFFICIENCY: ICD-10-CM

## 2018-02-05 LAB
INR: 2 (ref 0.8–1.2)
TEST STRIP EXPIRATION DATE: ABNORMAL DATE

## 2018-02-05 PROCEDURE — G0463 HOSPITAL OUTPT CLINIC VISIT: HCPCS | Performed by: INTERNAL MEDICINE

## 2018-02-05 PROCEDURE — 85610 PROTHROMBIN TIME: CPT

## 2018-02-05 PROCEDURE — 99214 OFFICE O/P EST MOD 30 MIN: CPT | Performed by: INTERNAL MEDICINE

## 2018-02-05 PROCEDURE — 36416 COLLJ CAPILLARY BLOOD SPEC: CPT

## 2018-02-05 NOTE — PROGRESS NOTES
Roberta Hui is a 76year old male. Patient presents with:  Checkup: 3 month  Congestive Heart Failure (cardiovascular)  Fatigue  Hypertension  Hyperlipidemia    HPI:   Patient presents with:  Checkup: 3 month  Congestive Heart Failure (cardiovascular) (current) use of non-steroidal anti-inflammatories    • Encounter for long-term (current) use of other medications    • High triglycerides    • Hypertension    • Presence of permanent cardiac pacemaker    • Pure hypercholesterolemia    • RA (rheumatoid art cardioverter-defibrillator) in place  Stable. CPM.    3. Renal insufficiency  Patient has renal insufficiency which appears stable. His BUN is 22, creatinine is 1.33 and GFR is 52. CPM.  As above.     4. Seropositive rheumatoid arthritis of multiple site

## 2018-02-05 NOTE — PATIENT INSTRUCTIONS
1.  Patient is to continue his current diet, medications and activity. 2.  I will plan see the patient back in 3 months with blood tests. 3.  I will see the patient back sooner as necessary.

## 2018-02-08 RX ORDER — HYDRALAZINE HYDROCHLORIDE 25 MG/1
25 TABLET, FILM COATED ORAL 2 TIMES DAILY
Qty: 180 TABLET | Refills: 3 | Status: SHIPPED | OUTPATIENT
Start: 2018-02-08 | End: 2019-02-25

## 2018-02-22 ENCOUNTER — LAB ENCOUNTER (OUTPATIENT)
Dept: LAB | Age: 78
End: 2018-02-22
Attending: INTERNAL MEDICINE
Payer: MEDICARE

## 2018-02-22 ENCOUNTER — PRIOR ORIGINAL RECORDS (OUTPATIENT)
Dept: OTHER | Age: 78
End: 2018-02-22

## 2018-02-22 DIAGNOSIS — R53.83 FATIGUE, UNSPECIFIED TYPE: ICD-10-CM

## 2018-02-22 DIAGNOSIS — E78.00 HYPERCHOLESTEREMIA: ICD-10-CM

## 2018-02-22 DIAGNOSIS — N18.30 CKD (CHRONIC KIDNEY DISEASE), STAGE III (HCC): ICD-10-CM

## 2018-02-22 DIAGNOSIS — I42.9 CARDIOMYOPATHY, UNSPECIFIED TYPE (HCC): ICD-10-CM

## 2018-02-22 DIAGNOSIS — D69.6 THROMBOCYTOPENIA (HCC): ICD-10-CM

## 2018-02-22 DIAGNOSIS — N18.30 CHRONIC KIDNEY DISEASE, STAGE III (MODERATE) (HCC): ICD-10-CM

## 2018-02-22 LAB
ALBUMIN SERPL BCP-MCNC: 3.6 G/DL (ref 3.5–4.8)
ALT SERPL-CCNC: 15 U/L (ref 17–63)
ANION GAP SERPL CALC-SCNC: 9 MMOL/L (ref 0–18)
AST SERPL-CCNC: 16 U/L (ref 15–41)
BACTERIA UR QL AUTO: NEGATIVE /HPF
BASOPHILS # BLD: 0 K/UL (ref 0–0.2)
BASOPHILS NFR BLD: 1 %
BILIRUB UR QL: NEGATIVE
BUN SERPL-MCNC: 26 MG/DL (ref 8–20)
BUN/CREAT SERPL: 19.1 (ref 10–20)
CALCIUM SERPL-MCNC: 9.2 MG/DL (ref 8.5–10.5)
CHLORIDE SERPL-SCNC: 105 MMOL/L (ref 95–110)
CHOLEST SERPL-MCNC: 104 MG/DL (ref 110–200)
CLARITY UR: CLEAR
CO2 SERPL-SCNC: 26 MMOL/L (ref 22–32)
COLOR UR: YELLOW
CREAT SERPL-MCNC: 1.36 MG/DL (ref 0.5–1.5)
EOSINOPHIL # BLD: 0.1 K/UL (ref 0–0.7)
EOSINOPHIL NFR BLD: 2 %
ERYTHROCYTE [DISTWIDTH] IN BLOOD BY AUTOMATED COUNT: 16.4 % (ref 11–15)
GLUCOSE SERPL-MCNC: 89 MG/DL (ref 70–99)
GLUCOSE UR-MCNC: NEGATIVE MG/DL
HCT VFR BLD AUTO: 33.9 % (ref 41–52)
HDLC SERPL-MCNC: 38 MG/DL
HGB BLD-MCNC: 11.7 G/DL (ref 13.5–17.5)
HGB UR QL STRIP.AUTO: NEGATIVE
INR BLD: 1.9 (ref 0.9–1.2)
KETONES UR-MCNC: NEGATIVE MG/DL
LDLC SERPL CALC-MCNC: 52 MG/DL (ref 0–99)
LEUKOCYTE ESTERASE UR QL STRIP.AUTO: NEGATIVE
LYMPHOCYTES # BLD: 0.4 K/UL (ref 1–4)
LYMPHOCYTES NFR BLD: 7 %
MCH RBC QN AUTO: 32.4 PG (ref 27–32)
MCHC RBC AUTO-ENTMCNC: 34.6 G/DL (ref 32–37)
MCV RBC AUTO: 93.6 FL (ref 80–100)
MONOCYTES # BLD: 0.5 K/UL (ref 0–1)
MONOCYTES NFR BLD: 9 %
NEUTROPHILS # BLD AUTO: 4.8 K/UL (ref 1.8–7.7)
NEUTROPHILS NFR BLD: 81 %
NITRITE UR QL STRIP.AUTO: NEGATIVE
NONHDLC SERPL-MCNC: 66 MG/DL
OSMOLALITY UR CALC.SUM OF ELEC: 294 MOSM/KG (ref 275–295)
PH UR: 5 [PH] (ref 5–8)
PHOSPHATE SERPL-MCNC: 3.3 MG/DL (ref 2.4–4.7)
PLATELET # BLD AUTO: 110 K/UL (ref 140–400)
PMV BLD AUTO: 8.2 FL (ref 7.4–10.3)
POTASSIUM SERPL-SCNC: 4 MMOL/L (ref 3.3–5.1)
PROT UR-MCNC: 100 MG/DL
PROTHROMBIN TIME: 21.5 SECONDS (ref 11.8–14.5)
RBC # BLD AUTO: 3.62 M/UL (ref 4.5–5.9)
RBC #/AREA URNS AUTO: 1 /HPF
SODIUM SERPL-SCNC: 140 MMOL/L (ref 136–144)
SP GR UR STRIP: 1.02 (ref 1–1.03)
TRIGL SERPL-MCNC: 72 MG/DL (ref 1–149)
UROBILINOGEN UR STRIP-ACNC: <2
VIT C UR-MCNC: NEGATIVE MG/DL
WBC # BLD AUTO: 5.9 K/UL (ref 4–11)
WBC #/AREA URNS AUTO: 1 /HPF

## 2018-02-22 PROCEDURE — 85025 COMPLETE CBC W/AUTO DIFF WBC: CPT

## 2018-02-22 PROCEDURE — 81001 URINALYSIS AUTO W/SCOPE: CPT

## 2018-02-22 PROCEDURE — 80069 RENAL FUNCTION PANEL: CPT

## 2018-02-22 PROCEDURE — 80061 LIPID PANEL: CPT

## 2018-02-22 PROCEDURE — 36415 COLL VENOUS BLD VENIPUNCTURE: CPT

## 2018-02-22 PROCEDURE — 84460 ALANINE AMINO (ALT) (SGPT): CPT

## 2018-02-22 PROCEDURE — 85610 PROTHROMBIN TIME: CPT

## 2018-02-22 PROCEDURE — 84450 TRANSFERASE (AST) (SGOT): CPT

## 2018-02-25 ENCOUNTER — TELEPHONE (OUTPATIENT)
Dept: INTERNAL MEDICINE CLINIC | Facility: CLINIC | Age: 78
End: 2018-02-25

## 2018-02-25 NOTE — TELEPHONE ENCOUNTER
Shoshana call pt and notify him that his recent blood tests have turned out well. He has a mild anemia as before. His BMP and Lipid Panel have turned out well. His INR is 1.9 which is good. He should continue his monthly INR/Protimes.   I will route this to

## 2018-02-26 NOTE — TELEPHONE ENCOUNTER
Called and spoke with daughter teja (HIPAA verified). Relayed MDs message to daughter-verbalized understanding.

## 2018-02-27 ENCOUNTER — TELEPHONE (OUTPATIENT)
Dept: INTERNAL MEDICINE CLINIC | Facility: CLINIC | Age: 78
End: 2018-02-27

## 2018-02-27 ENCOUNTER — HOSPITAL ENCOUNTER (INPATIENT)
Facility: HOSPITAL | Age: 78
LOS: 2 days | Discharge: HOME HEALTH | DRG: 291 | End: 2018-03-01
Admitting: HOSPITALIST
Payer: MEDICARE

## 2018-02-27 ENCOUNTER — APPOINTMENT (OUTPATIENT)
Dept: GENERAL RADIOLOGY | Facility: HOSPITAL | Age: 78
DRG: 291 | End: 2018-02-27
Payer: MEDICARE

## 2018-02-27 DIAGNOSIS — N18.30 CKD (CHRONIC KIDNEY DISEASE), STAGE III (HCC): ICD-10-CM

## 2018-02-27 DIAGNOSIS — I50.9 CONGESTIVE HEART FAILURE, UNSPECIFIED CHRONICITY, UNSPECIFIED HEART FAILURE TYPE: Primary | ICD-10-CM

## 2018-02-27 DIAGNOSIS — D69.6 THROMBOCYTOPENIA (HCC): ICD-10-CM

## 2018-02-27 PROBLEM — J20.9 ACUTE BRONCHITIS: Status: ACTIVE | Noted: 2018-02-27

## 2018-02-27 PROBLEM — E87.1 HYPONATREMIA: Status: ACTIVE | Noted: 2018-02-27

## 2018-02-27 PROBLEM — D64.9 ANEMIA: Status: ACTIVE | Noted: 2018-02-27

## 2018-02-27 LAB
ANION GAP SERPL CALC-SCNC: 9 MMOL/L (ref 0–18)
BASOPHILS # BLD: 0 K/UL (ref 0–0.2)
BASOPHILS NFR BLD: 1 %
BNP SERPL-MCNC: 704 PG/ML (ref 0–100)
BUN SERPL-MCNC: 32 MG/DL (ref 8–20)
BUN/CREAT SERPL: 19 (ref 10–20)
CALCIUM SERPL-MCNC: 8.6 MG/DL (ref 8.5–10.5)
CHLORIDE SERPL-SCNC: 100 MMOL/L (ref 95–110)
CO2 SERPL-SCNC: 24 MMOL/L (ref 22–32)
CREAT SERPL-MCNC: 1.68 MG/DL (ref 0.5–1.5)
EOSINOPHIL # BLD: 0 K/UL (ref 0–0.7)
EOSINOPHIL NFR BLD: 1 %
ERYTHROCYTE [DISTWIDTH] IN BLOOD BY AUTOMATED COUNT: 16.9 % (ref 11–15)
FLUAV + FLUBV RNA SPEC NAA+PROBE: NEGATIVE
FLUAV + FLUBV RNA SPEC NAA+PROBE: NEGATIVE
FLUAV + FLUBV RNA SPEC NAA+PROBE: POSITIVE
GLUCOSE SERPL-MCNC: 98 MG/DL (ref 70–99)
HCT VFR BLD AUTO: 31.2 % (ref 41–52)
HGB BLD-MCNC: 10.6 G/DL (ref 13.5–17.5)
INR BLD: 1.9 (ref 0.9–1.2)
LYMPHOCYTES # BLD: 0.2 K/UL (ref 1–4)
LYMPHOCYTES NFR BLD: 7 %
MAGNESIUM SERPL-MCNC: 1.7 MG/DL (ref 1.8–2.5)
MCH RBC QN AUTO: 31.5 PG (ref 27–32)
MCHC RBC AUTO-ENTMCNC: 34 G/DL (ref 32–37)
MCV RBC AUTO: 92.7 FL (ref 80–100)
MONOCYTES # BLD: 0.5 K/UL (ref 0–1)
MONOCYTES NFR BLD: 16 %
NEUTROPHILS # BLD AUTO: 2.3 K/UL (ref 1.8–7.7)
NEUTROPHILS NFR BLD: 76 %
OSMOLALITY UR CALC.SUM OF ELEC: 283 MOSM/KG (ref 275–295)
PLATELET # BLD AUTO: 91 K/UL (ref 140–400)
PMV BLD AUTO: 8.4 FL (ref 7.4–10.3)
POTASSIUM SERPL-SCNC: 4.2 MMOL/L (ref 3.3–5.1)
PROTHROMBIN TIME: 21.1 SECONDS (ref 11.8–14.5)
RBC # BLD AUTO: 3.37 M/UL (ref 4.5–5.9)
SODIUM SERPL-SCNC: 133 MMOL/L (ref 136–144)
TROPONIN I SERPL-MCNC: 0.07 NG/ML (ref ?–0.03)
TROPONIN I SERPL-MCNC: 0.07 NG/ML (ref ?–0.03)
WBC # BLD AUTO: 3.1 K/UL (ref 4–11)

## 2018-02-27 PROCEDURE — 99222 1ST HOSP IP/OBS MODERATE 55: CPT | Performed by: INTERNAL MEDICINE

## 2018-02-27 PROCEDURE — 99223 1ST HOSP IP/OBS HIGH 75: CPT | Performed by: HOSPITALIST

## 2018-02-27 PROCEDURE — 71046 X-RAY EXAM CHEST 2 VIEWS: CPT

## 2018-02-27 RX ORDER — ACETAMINOPHEN 325 MG/1
650 TABLET ORAL EVERY 6 HOURS PRN
Status: DISCONTINUED | OUTPATIENT
Start: 2018-02-27 | End: 2018-03-01

## 2018-02-27 RX ORDER — SPIRONOLACTONE 25 MG/1
25 TABLET ORAL DAILY
Status: DISCONTINUED | OUTPATIENT
Start: 2018-02-28 | End: 2018-03-01

## 2018-02-27 RX ORDER — METHYLPREDNISOLONE SODIUM SUCCINATE 40 MG/ML
40 INJECTION, POWDER, LYOPHILIZED, FOR SOLUTION INTRAMUSCULAR; INTRAVENOUS EVERY 6 HOURS
Status: DISCONTINUED | OUTPATIENT
Start: 2018-02-27 | End: 2018-02-28

## 2018-02-27 RX ORDER — PREDNISONE 1 MG/1
2 TABLET ORAL
Status: DISCONTINUED | OUTPATIENT
Start: 2018-02-28 | End: 2018-02-28

## 2018-02-27 RX ORDER — WARFARIN SODIUM 5 MG/1
5 TABLET ORAL NIGHTLY
Status: DISCONTINUED | OUTPATIENT
Start: 2018-02-27 | End: 2018-03-01

## 2018-02-27 RX ORDER — LOSARTAN POTASSIUM 100 MG/1
100 TABLET ORAL DAILY
Status: DISCONTINUED | OUTPATIENT
Start: 2018-02-28 | End: 2018-02-27

## 2018-02-27 RX ORDER — MAGNESIUM OXIDE 400 MG (241.3 MG MAGNESIUM) TABLET
400 TABLET ONCE
Status: COMPLETED | OUTPATIENT
Start: 2018-02-27 | End: 2018-02-27

## 2018-02-27 RX ORDER — FOLIC ACID 1 MG/1
1 TABLET ORAL DAILY
Status: DISCONTINUED | OUTPATIENT
Start: 2018-02-28 | End: 2018-03-01

## 2018-02-27 RX ORDER — ISOSORBIDE MONONITRATE 30 MG/1
60 TABLET, EXTENDED RELEASE ORAL
Status: DISCONTINUED | OUTPATIENT
Start: 2018-02-28 | End: 2018-03-01

## 2018-02-27 RX ORDER — SODIUM CHLORIDE 0.9 % (FLUSH) 0.9 %
3 SYRINGE (ML) INJECTION AS NEEDED
Status: DISCONTINUED | OUTPATIENT
Start: 2018-02-27 | End: 2018-03-01

## 2018-02-27 RX ORDER — GUAIFENESIN 100 MG/5ML
200 SOLUTION ORAL EVERY 4 HOURS PRN
Status: DISCONTINUED | OUTPATIENT
Start: 2018-02-27 | End: 2018-03-01

## 2018-02-27 RX ORDER — HYDRALAZINE HYDROCHLORIDE 25 MG/1
25 TABLET, FILM COATED ORAL 2 TIMES DAILY
Status: DISCONTINUED | OUTPATIENT
Start: 2018-02-27 | End: 2018-03-01

## 2018-02-27 RX ORDER — ALBUTEROL SULFATE 2.5 MG/3ML
2.5 SOLUTION RESPIRATORY (INHALATION) EVERY 6 HOURS PRN
Status: DISCONTINUED | OUTPATIENT
Start: 2018-02-27 | End: 2018-03-01

## 2018-02-27 RX ORDER — FUROSEMIDE 10 MG/ML
INJECTION INTRAMUSCULAR; INTRAVENOUS
Status: COMPLETED
Start: 2018-02-27 | End: 2018-02-27

## 2018-02-27 RX ORDER — FUROSEMIDE 10 MG/ML
20 INJECTION INTRAMUSCULAR; INTRAVENOUS
Status: DISCONTINUED | OUTPATIENT
Start: 2018-02-27 | End: 2018-02-27

## 2018-02-27 RX ORDER — ATORVASTATIN CALCIUM 20 MG/1
20 TABLET, FILM COATED ORAL NIGHTLY
Status: DISCONTINUED | OUTPATIENT
Start: 2018-02-27 | End: 2018-03-01

## 2018-02-27 RX ORDER — FLUOXETINE 10 MG/1
10 CAPSULE ORAL DAILY
Status: DISCONTINUED | OUTPATIENT
Start: 2018-02-28 | End: 2018-03-01

## 2018-02-27 RX ORDER — OSELTAMIVIR PHOSPHATE 75 MG/1
75 CAPSULE ORAL 2 TIMES DAILY
Status: DISCONTINUED | OUTPATIENT
Start: 2018-02-27 | End: 2018-03-01

## 2018-02-27 RX ORDER — ONDANSETRON 2 MG/ML
4 INJECTION INTRAMUSCULAR; INTRAVENOUS EVERY 6 HOURS PRN
Status: DISCONTINUED | OUTPATIENT
Start: 2018-02-27 | End: 2018-03-01

## 2018-02-27 RX ORDER — FINASTERIDE 5 MG/1
5 TABLET, FILM COATED ORAL DAILY
Status: DISCONTINUED | OUTPATIENT
Start: 2018-02-28 | End: 2018-03-01

## 2018-02-27 RX ORDER — CARVEDILOL 25 MG/1
25 TABLET ORAL 2 TIMES DAILY WITH MEALS
Status: DISCONTINUED | OUTPATIENT
Start: 2018-02-27 | End: 2018-03-01

## 2018-02-27 NOTE — HISTORICAL OFFICE NOTE
Leigh Baugh  : 1940  ACCOUNT:  [id number]  [phone]  PCP: Dr. Myles Spear     TODAY'S DATE: 2017  DICTATED BY:  Ryan Kaur M.D.]    CHIEF COMPLAINT: [Followup of Unspecified atrial fibrillation.]    HPI:    Franky Skinner 2017Coy [place] edema    MEDICATIONS: Selected prescriptions see below    VITAL SIGNS: [B/P - 120/70 , Pulse - 72, Respiration - 14, Weight -  172, Height -   69 , BMI - 25.4 ]    CONS: pleasant. WEIGHT: BMI parameters reviewed and discussed.  HEAD/FACE: no trauma and norm 04/10/17 *Atorvastatin Calcium 20MG      1 TABLET AT BEDTIME.                     04/07/17 *Carvedilol           25MG      1 TABLET TWICE DAILY.                     09/07/16 *Isosorbide [id number]      1 TABLET TWICE DAILYAS DIRECTED

## 2018-02-27 NOTE — H&P
[facility]    PATIENT'S NAME: Mansoor Leroy   ATTENDING PHYSICIAN: Swathi Pool MD   PATIENT ACCOUNT#:   [id number]    LOCATION:  [place] [address]  MEDICAL RECORD #:   [id number]       YOB: 1940  ADMISSION DATE:       02/27/2 exertion, which has been noticeable in the last 3 to 4 days. No fever or chills. No body aches. Other 12-point review of systems is negative. PHYSICAL EXAMINATION:    GENERAL:  Alert. Oriented to time, place and person. Moderate distress.   VITAL

## 2018-02-27 NOTE — ED INITIAL ASSESSMENT (HPI)
Pt seen by Dr. Guanaco Duran ( nephrologist) informed to come down to ER for labored breathing, cough and sob. Dyspneic upon exertion. Hx of CHF, NO chest pain.

## 2018-02-27 NOTE — CONSULTS
[facility]    Cardiology Consultation  [facility]    Connie Gutierrez Patient Status:  Emergency    1940 MRN [id number]   Location [address] Attending Anita Molina MD   [facility] Da date: TONSILLECTOMY    Family History  Family History   Problem Relation Age of Onset   • Heart Attack Father 46      of MI   • Cancer Mother 72   • Eye Problems Brother    • cancer lung [OTHER] Brother    • Heart Disease Brother 79   • crohns Laureen Sites bruits. Cardiac: Paced rhythm, probable underlying A. fib  Lungs: Diffuse wheezes bilaterally intermediate up. Abdomen: Soft, non-tender. No organosplenomegally, mass or rebound, BS-present. Extremities: Without clubbing, cyanosis or edema.   Peripheral pulse in the care of your patient.     Jerry Soulier  2/27/2018

## 2018-02-27 NOTE — CONSULTS
[facility] - [facility]    Report of Consultation    Michi Ledezma Patient Status:  Emergency    1940 MRN [id number]   Location [address] Attending Julia Langford MD   Hosp Day # 0 COTY Rollins MD     D • Heart Disease Brother 79   • crohns [OTHER] Son    • alive and well [OTHER] Son    • neorological Wilhemena Fuel Daughter    • alive and well [OTHER] Daughter    • rheumatoid arthritis [OTHER] Paternal Grandfather        Social History  Patient Guardian Statu 1.68 (H) 02/27/2018   BUN 32 (H) 02/27/2018    (L) 02/27/2018   K 4.2 02/27/2018    02/27/2018   CO2 24 02/27/2018   GLU 98 02/27/2018   CA 8.6 02/27/2018   ALB 3.6 02/22/2018   ALKPHO 59 12/02/2017   TP 6.2 12/02/2017   AST 16 02/22/2018   ALT

## 2018-02-27 NOTE — ED PROVIDER NOTES
Patient Seen in: [facility] AND [facility] Emergency Department    History   Patient presents with:  Dyspnea JANELLE SOB (respiratory)    Stated Complaint: SOB, CHF, pacemaker    HPI    Patient complains of shortness of breath that began past several days much worse EVERY DAY   METHOTREXATE 2.5 MG Oral Tab,  TAKE TWO TABLETS (5MG) BYMOUTH ONCE WEEKLY. predniSONE 1 MG Oral Tab,  TAKE 2 TABLETS BY MOUTH EVERY MORNING   spironolactone (ALDACTONE) 25 MG Oral Tab,  Take 25 mg by mouth daily.    ISOSORBIDE MONONITRATE 30 M air    GENERAL: conversational dyspnea with audible wheezing  HEAD: normocephalic, atraumatic,   EYES: PERRLA, EOMI,  THROAT: mm dry, no lesions  NECK: supple, no meningeal signs  LUNGS:basilar rales, tachypnea  CARDIO: rr, brisk cap refill  GI: abdomen is Abnormality         Status                     ---------                               -----------         ------                     CBC W/ DIFFERENTIAL[[id number]]          Abnormal            Final result                 Plea specified.     Medications Prescribed:  Current Discharge Medication List        Present on Admission  Date Reviewed: 2/5/2018          ICD-10-CM Noted POA    * (Principal)Congestive heart failure, unspecified chronicity, unspecified heart failure type ([facility].

## 2018-02-28 ENCOUNTER — APPOINTMENT (OUTPATIENT)
Dept: CV DIAGNOSTICS | Facility: HOSPITAL | Age: 78
DRG: 291 | End: 2018-02-28
Attending: NURSE PRACTITIONER
Payer: MEDICARE

## 2018-02-28 PROBLEM — I50.9 CONGESTIVE HEART FAILURE (HCC): Status: ACTIVE | Noted: 2018-02-27

## 2018-02-28 LAB
ANION GAP SERPL CALC-SCNC: 13 MMOL/L (ref 0–18)
BASOPHILS # BLD: 0 K/UL (ref 0–0.2)
BASOPHILS NFR BLD: 0 %
BUN SERPL-MCNC: 40 MG/DL (ref 8–20)
BUN/CREAT SERPL: 21.4 (ref 10–20)
CALCIUM SERPL-MCNC: 8.9 MG/DL (ref 8.5–10.5)
CHLORIDE SERPL-SCNC: 97 MMOL/L (ref 95–110)
CO2 SERPL-SCNC: 27 MMOL/L (ref 22–32)
CREAT SERPL-MCNC: 1.87 MG/DL (ref 0.5–1.5)
EOSINOPHIL # BLD: 0 K/UL (ref 0–0.7)
EOSINOPHIL NFR BLD: 0 %
ERYTHROCYTE [DISTWIDTH] IN BLOOD BY AUTOMATED COUNT: 16.8 % (ref 11–15)
GLUCOSE SERPL-MCNC: 141 MG/DL (ref 70–99)
HCT VFR BLD AUTO: 34.5 % (ref 41–52)
HGB BLD-MCNC: 12 G/DL (ref 13.5–17.5)
INR BLD: 2.1 (ref 0.9–1.2)
LYMPHOCYTES # BLD: 0.2 K/UL (ref 1–4)
LYMPHOCYTES NFR BLD: 9 %
MAGNESIUM SERPL-MCNC: 1.8 MG/DL (ref 1.8–2.5)
MCH RBC QN AUTO: 32.2 PG (ref 27–32)
MCHC RBC AUTO-ENTMCNC: 34.8 G/DL (ref 32–37)
MCV RBC AUTO: 92.5 FL (ref 80–100)
MONOCYTES # BLD: 0.1 K/UL (ref 0–1)
MONOCYTES NFR BLD: 3 %
NEUTROPHILS # BLD AUTO: 1.8 K/UL (ref 1.8–7.7)
NEUTROPHILS NFR BLD: 87 %
OSMOLALITY UR CALC.SUM OF ELEC: 296 MOSM/KG (ref 275–295)
PLATELET # BLD AUTO: 90 K/UL (ref 140–400)
PMV BLD AUTO: 8.1 FL (ref 7.4–10.3)
POTASSIUM SERPL-SCNC: 3.9 MMOL/L (ref 3.3–5.1)
PROTHROMBIN TIME: 22.8 SECONDS (ref 11.8–14.5)
RBC # BLD AUTO: 3.73 M/UL (ref 4.5–5.9)
SODIUM SERPL-SCNC: 137 MMOL/L (ref 136–144)
TROPONIN I SERPL-MCNC: 0.07 NG/ML (ref ?–0.03)
WBC # BLD AUTO: 2 K/UL (ref 4–11)

## 2018-02-28 PROCEDURE — 99233 SBSQ HOSP IP/OBS HIGH 50: CPT | Performed by: HOSPITALIST

## 2018-02-28 PROCEDURE — 93306 TTE W/DOPPLER COMPLETE: CPT | Performed by: NURSE PRACTITIONER

## 2018-02-28 PROCEDURE — 99232 SBSQ HOSP IP/OBS MODERATE 35: CPT | Performed by: INTERNAL MEDICINE

## 2018-02-28 RX ORDER — OSELTAMIVIR PHOSPHATE 75 MG/1
75 CAPSULE ORAL 2 TIMES DAILY
Qty: 8 CAPSULE | Refills: 0 | Status: SHIPPED | OUTPATIENT
Start: 2018-02-28 | End: 2018-03-01

## 2018-02-28 RX ORDER — FUROSEMIDE 20 MG/1
40 TABLET ORAL DAILY
Qty: 60 TABLET | Refills: 0 | Status: SHIPPED | OUTPATIENT
Start: 2018-02-28 | End: 2018-03-01

## 2018-02-28 RX ORDER — PREDNISONE 20 MG/1
40 TABLET ORAL
Qty: 2 TABLET | Refills: 0 | Status: SHIPPED | OUTPATIENT
Start: 2018-03-01 | End: 2018-04-04

## 2018-02-28 RX ORDER — PREDNISONE 1 MG/1
2 TABLET ORAL
Status: DISCONTINUED | OUTPATIENT
Start: 2018-03-04 | End: 2018-03-01

## 2018-02-28 RX ORDER — GUAIFENESIN 100 MG/5ML
200 SOLUTION ORAL EVERY 4 HOURS PRN
Qty: 118 ML | Refills: 0 | Status: SHIPPED | OUTPATIENT
Start: 2018-02-28 | End: 2018-05-10

## 2018-02-28 RX ORDER — PREDNISONE 20 MG/1
40 TABLET ORAL
Status: DISCONTINUED | OUTPATIENT
Start: 2018-03-01 | End: 2018-03-01

## 2018-02-28 RX ORDER — MAGNESIUM OXIDE 400 MG (241.3 MG MAGNESIUM) TABLET
400 TABLET ONCE
Status: COMPLETED | OUTPATIENT
Start: 2018-02-28 | End: 2018-02-28

## 2018-02-28 NOTE — PLAN OF CARE
METABOLIC/FLUID AND ELECTROLYTES - ADULT    • Electrolytes maintained within normal limits Progressing        PAIN - ADULT    • Verbalizes/displays adequate comfort level or patient's stated pain goal Progressing        Patient Centered Care    • Patient p

## 2018-02-28 NOTE — CM/SW NOTE
Spoke with Patient and daughter Miguelito Neville. Pt lives at home with wife. Caregiver comes in Mon-Fri 4-6pm and cooks dinner for Pt and wife. Daughter lives 3 blocks away. P hs 4 other children th live nearby.  Pt and daughter do not think they need any home care

## 2018-02-28 NOTE — PLAN OF CARE
RECEIVED REPORT THIS AM THAT PT. WAS ON LASIX DRIP AT 40ML/HR, PT. WAS URINATING FREQUENTLY, LARGE AMOUNTS, AND INCONTINENT. REVIEWED NOTES, CARDIOLOGY CONSULT STATED PT. RECEIVED A DOSE OF LASIX IN ER YESTERDAY, NOT CHF, AND WOULD MONITOR.  AFTER THIS I AS

## 2018-02-28 NOTE — CONSULTS
[facility] HOSP - [facility]    Report of Consultation    [facility] Patient Status:  Inpatient    1940 MRN [id number]   Location [facility] 3W/SW Attending Nichole Young MD   Hosp Day # 1 PCP Edilma Mcmahan MD     Date of Admissio oz/week     Cans of beer: 0 - 5 per week     Comment: 0-5 beers weekly      Current Medications:    Current Facility-Administered Medications:  Normal Saline Flush 0.9 % injection 3 mL 3 mL Intravenous PRN   acetaminophen (TYLENOL) tab 650 mg 650 mg Oral Q TWO TABLETS (5MG) TAINAMOUTH ONCE WEEKLY. predniSONE 1 MG Oral Tab TAKE 2 TABLETS BY MOUTH EVERY MORNING   spironolactone (ALDACTONE) 25 MG Oral Tab Take 25 mg by mouth daily.    ISOSORBIDE MONONITRATE 30 MG OR TB24 2 TABLETS DAILY   carvedilol 25 MG Oral Ta Chest (cpt=71046)    Result Date: 2/27/2018  CONCLUSION:  1. No acute findings.                    Impression/Receommendations:     1 - CKD 4/CHF/fluid overload  Pt appears euvolemic today, creatinine at baseline  Recommend no further IV diuretic   Can resu

## 2018-02-28 NOTE — PROGRESS NOTES
[facility] Heart Cardiology Progress Note      Kavitha Bryan Patient Status:  Inpatient    1940 MRN [id number]   Location [facility] 3W/SW Attending Ivis Hills MD   Hosp Day # 1 PCP Agustin Campos MD organomegaly, bowel sounds present  Ext:  no clubbing, no cyanosis,no edema  Neuro: no focal deficits  Skin: no rashes or lesions, left chest ICD site well healed.      Scheduled Meds:   • [START ON 3/1/2018] predniSONE  40 mg Oral Daily with breakfast   •

## 2018-02-28 NOTE — PROGRESS NOTES
Pulmonary/Critical Care Follow Up Note    HPI:   Macey Stephenson is a 76year old male with Patient presents with:  Dyspnea JANELLE SOB (respiratory)      PCP Georges Zapata MD  Admission Attending Jagdish De Leon MD    Hospital Day #1    Less sob  No whee developed angioedema of his mouth. Lisinopril                  Comment:Other reaction(s): angioedema        PHYSICAL EXAM:   Blood pressure 109/98, pulse 70, temperature (!) 97.5 °F (36.4 °C), temperature source Axillary, resp.  rate 18, height 5' 10\" (1.

## 2018-02-28 NOTE — DISCHARGE SUMMARY
[facility]    Discharge Summary    Agata Miranda Patient Status:  Inpatient    1940 MRN [id number]   Location [facility] 3W/SW Attending Yesica Palomino MD   Hosp Day # 2 PCP Les Greenfield MD     Date of Admission: pain     Angioedema     C. difficile enteritis     Cardiomyopathy, unspecified type ([facility]     Pulmonary emphysema (HCC)     Hyponatremia     Anemia     CHF (congestive heart failure) (HCC)     Congestive heart failure (HCC)     Acute bronchitis    Influenz n/a    Complications: n/a    Discharge Condition: Good    Discharge Medications:      Discharge Medications      START taking these medications      Instructions Prescription details   furosemide 20 MG Tabs  Commonly known as:  LASIX      Take 1 tablet (20 finasteride 5 MG Tabs  Commonly known as:  PROSCAR      TAKE 1 TABLET (5 MG TOTAL) BY MOUTH DAILY.    Quantity:  30 tablet  Refills:  11     FLUoxetine HCl 10 MG Caps  Commonly known as:  PROZAC      TAKE 1 CAPSULE BY MOUTH EVERY DAY   Quantity:  90 capsu MD  3/1/2018  2:26 PM    > 35 min

## 2018-02-28 NOTE — PROGRESS NOTES
[facility]    Progress Note    Aaron Lundy Patient Status:  Inpatient    1940 MRN [id number]   Location [facility] 3W/SW Attending Brian Bahena MD   Hosp Day # 1 PCP Gonzalez Greenfield MD       Subjective:    Latrice Damon 02/28/2018   BUN 40 (H) 02/28/2018    02/28/2018   K 3.9 02/28/2018   CL 97 02/28/2018   CO2 27 02/28/2018    (H) 02/28/2018   CA 8.9 02/28/2018   ALB 3.6 02/22/2018   ALKPHO 59 12/02/2017   BILT 1.8 (H) 12/02/2017   TP 6.2 12/02/2017   AST 16

## 2018-03-01 ENCOUNTER — PRIOR ORIGINAL RECORDS (OUTPATIENT)
Dept: OTHER | Age: 78
End: 2018-03-01

## 2018-03-01 VITALS
TEMPERATURE: 98 F | HEART RATE: 74 BPM | OXYGEN SATURATION: 90 % | SYSTOLIC BLOOD PRESSURE: 138 MMHG | WEIGHT: 155.38 LBS | RESPIRATION RATE: 20 BRPM | HEIGHT: 70 IN | BODY MASS INDEX: 22.24 KG/M2 | DIASTOLIC BLOOD PRESSURE: 89 MMHG

## 2018-03-01 LAB
ALBUMIN SERPL BCP-MCNC: 3.4 G/DL (ref 3.5–4.8)
ANION GAP SERPL CALC-SCNC: 12 MMOL/L (ref 0–18)
BASOPHILS # BLD: 0 K/UL (ref 0–0.2)
BASOPHILS NFR BLD: 0 %
BUN SERPL-MCNC: 55 MG/DL (ref 8–20)
BUN/CREAT SERPL: 26.8 (ref 10–20)
CALCIUM SERPL-MCNC: 8.8 MG/DL (ref 8.5–10.5)
CHLORIDE SERPL-SCNC: 98 MMOL/L (ref 95–110)
CO2 SERPL-SCNC: 27 MMOL/L (ref 22–32)
CREAT SERPL-MCNC: 2.05 MG/DL (ref 0.5–1.5)
EOSINOPHIL # BLD: 0 K/UL (ref 0–0.7)
EOSINOPHIL NFR BLD: 0 %
ERYTHROCYTE [DISTWIDTH] IN BLOOD BY AUTOMATED COUNT: 17 % (ref 11–15)
GLUCOSE SERPL-MCNC: 127 MG/DL (ref 70–99)
HCT VFR BLD AUTO: 34.3 % (ref 41–52)
HGB BLD-MCNC: 12 G/DL (ref 13.5–17.5)
INR BLD: 3.1 (ref 0.9–1.2)
LYMPHOCYTES # BLD: 0.2 K/UL (ref 1–4)
LYMPHOCYTES NFR BLD: 4 %
MAGNESIUM SERPL-MCNC: 2 MG/DL (ref 1.8–2.5)
MCH RBC QN AUTO: 32.1 PG (ref 27–32)
MCHC RBC AUTO-ENTMCNC: 35 G/DL (ref 32–37)
MCV RBC AUTO: 91.5 FL (ref 80–100)
MONOCYTES # BLD: 0.4 K/UL (ref 0–1)
MONOCYTES NFR BLD: 7 %
NEUTROPHILS # BLD AUTO: 4.8 K/UL (ref 1.8–7.7)
NEUTROPHILS NFR BLD: 88 %
OSMOLALITY UR CALC.SUM OF ELEC: 301 MOSM/KG (ref 275–295)
PHOSPHATE SERPL-MCNC: 5.7 MG/DL (ref 2.4–4.7)
PLATELET # BLD AUTO: 102 K/UL (ref 140–400)
PMV BLD AUTO: 8.4 FL (ref 7.4–10.3)
POTASSIUM SERPL-SCNC: 3.4 MMOL/L (ref 3.3–5.1)
PROTHROMBIN TIME: 31.2 SECONDS (ref 11.8–14.5)
RBC # BLD AUTO: 3.75 M/UL (ref 4.5–5.9)
SODIUM SERPL-SCNC: 137 MMOL/L (ref 136–144)
WBC # BLD AUTO: 5.4 K/UL (ref 4–11)

## 2018-03-01 PROCEDURE — 99232 SBSQ HOSP IP/OBS MODERATE 35: CPT | Performed by: INTERNAL MEDICINE

## 2018-03-01 PROCEDURE — 99239 HOSP IP/OBS DSCHRG MGMT >30: CPT | Performed by: HOSPITALIST

## 2018-03-01 RX ORDER — FUROSEMIDE 20 MG/1
20 TABLET ORAL DAILY
Qty: 30 TABLET | Refills: 0 | Status: SHIPPED | OUTPATIENT
Start: 2018-03-01 | End: 2018-03-31

## 2018-03-01 RX ORDER — WARFARIN SODIUM 5 MG/1
TABLET ORAL
Qty: 90 TABLET | Refills: 1 | Status: SHIPPED | OUTPATIENT
Start: 2018-03-01 | End: 2019-03-03

## 2018-03-01 RX ORDER — OSELTAMIVIR PHOSPHATE 75 MG/1
75 CAPSULE ORAL 2 TIMES DAILY
Qty: 6 CAPSULE | Refills: 0 | Status: SHIPPED | OUTPATIENT
Start: 2018-03-01 | End: 2018-03-04

## 2018-03-01 RX ORDER — POTASSIUM CHLORIDE 20 MEQ/1
40 TABLET, EXTENDED RELEASE ORAL ONCE
Status: COMPLETED | OUTPATIENT
Start: 2018-03-01 | End: 2018-03-01

## 2018-03-01 NOTE — CM/SW NOTE
3/1/18 CM Discharge planning / MDO for home health   Pt agreed to [facility] services, referral made to [facility].    Osman Lawson X [phone]

## 2018-03-01 NOTE — HOME CARE LIAISON
MET WITH PATIENT AND HIS DAUGHTER ROMEL, TO [address] BOTH IN AGREEMENT WITH SERVICES BEING PROVIDED BY [facility]. PROVIDED [facility] WITH CONTACT INFORMATION, ALONG WITH LIAISON'S BUSINESS CARD.

## 2018-03-01 NOTE — PROGRESS NOTES
[facility]     Progress Note        Coy Tamayo Patient Status:  Inpatient    1940 MRN [id number]   Location [facility] 3W/SW Attending Kristen Celaya MD   Hosp Day # 2 PCP Modesta Carbone MD       Subjective:   Lisa Retana Infusions:     Physical Exam  Constitutional: no acute distress  HEENT: PERRL  Cardio: RRR, S1 S2  Respiratory: Diminished breath sounds bilaterally, no significant wheezing  GI: abdomen soft, non tender  Extremities: no clubbing, cyanosis, edema  Neurolog

## 2018-03-01 NOTE — PROGRESS NOTES
[facility]    Cardiology Progress Note    Kristian De La Cruz Patient Status:  Inpatient    1940 MRN [id number]   Location [facility] 3W/SW Attending Ward Monson MD   Hosp Day # 2 PCP Marlyn Roblero MD     Primary Cardio holding ARB and Lasix after +diuresis               SARAH/CKI - Cr.  Leveling off, stable                Afib - rate / rhythm stable, on BB , Warfarin for AC     RECS  - hold ARB losartan 100 until labs checked Monday  - would restart diuretic at half dose of

## 2018-03-01 NOTE — PROGRESS NOTES
[facility]    Progress Note    Rodri Carrera Patient Status:  Inpatient    1940 MRN [id number]   Location [facility] 3W/SW Attending Franchesca Wylie MD   Hosp Day # 2 PCP Jacobo Askew MD       Subjective:    Nick Spencer 03/01/2018    03/01/2018   K 3.4 03/01/2018   CL 98 03/01/2018   CO2 27 03/01/2018    (H) 03/01/2018   CA 8.8 03/01/2018   ALB 3.4 (L) 03/01/2018   ALKPHO 59 12/02/2017   BILT 1.8 (H) 12/02/2017   TP 6.2 12/02/2017   AST 16 02/22/2018   ALT 15

## 2018-03-01 NOTE — PLAN OF CARE
MAINTAINING DROPLET PRECAUTIONS, CONTINUING TAMIFLU, PT. WAS FOR POSSIBLE D/C HOME TODAY, BUT STAYED FOR 2D ECHO, D/C HOME TOMORROW

## 2018-03-02 ENCOUNTER — PATIENT OUTREACH (OUTPATIENT)
Dept: CASE MANAGEMENT | Age: 78
End: 2018-03-02

## 2018-03-02 ENCOUNTER — TELEPHONE (OUTPATIENT)
Dept: CARDIOLOGY UNIT | Facility: HOSPITAL | Age: 78
End: 2018-03-02

## 2018-03-02 ENCOUNTER — TELEPHONE (OUTPATIENT)
Dept: INTERNAL MEDICINE CLINIC | Facility: CLINIC | Age: 78
End: 2018-03-02

## 2018-03-02 NOTE — TELEPHONE ENCOUNTER
Pt discharged from the hospital last night  Needs to speak with nurse or doctor to clarify meds on the discharge papers    1. Lasix dose 20 mg - 2 X a day or 1 X     2.  Prednisone   40 for today & tomorrow - then on Sunday goes back to normal dose of 2 mg

## 2018-03-02 NOTE — PROGRESS NOTES
S/w patient's daughter Jaime Baez. She states that she and her siblings take care of the patient when it comes to medications and care.  She states that she understands all of the instructions for his medications and they are very thorough when it comes to th

## 2018-03-02 NOTE — TELEPHONE ENCOUNTER
This discussed at length with patient's daughter, Severiano Duster. Patient was released from the hospital past week after having had a combination of congestive heart failure and bronchitis and flu. Patient is currently doing well at home.   He is taking Lasix 20

## 2018-03-03 ENCOUNTER — TELEPHONE (OUTPATIENT)
Dept: INTERNAL MEDICINE CLINIC | Facility: CLINIC | Age: 78
End: 2018-03-03

## 2018-03-03 LAB — INR: 3.8 (ref 0.8–1.2)

## 2018-03-03 NOTE — TELEPHONE ENCOUNTER
Received call from Darylene Flash. H.H. INR today 3.8.. Noted is that INR was 3.1 Thursday. Instructions were to hold coumadin that day and take coumadin 2.5 mg Friday and repeat today. Patients usual coumadin regime is 2.5 mg MWF and 5 mg rest of days.  Since INR i

## 2018-03-05 ENCOUNTER — TELEPHONE (OUTPATIENT)
Dept: INTERNAL MEDICINE CLINIC | Facility: CLINIC | Age: 78
End: 2018-03-05

## 2018-03-05 ENCOUNTER — TELEPHONE (OUTPATIENT)
Dept: INTERNAL MEDICINE UNIT | Facility: HOSPITAL | Age: 78
End: 2018-03-05

## 2018-03-05 DIAGNOSIS — I42.9 CARDIOMYOPATHY, UNSPECIFIED TYPE (HCC): ICD-10-CM

## 2018-03-05 DIAGNOSIS — N18.30 CHRONIC KIDNEY DISEASE, STAGE III (MODERATE) (HCC): Primary | ICD-10-CM

## 2018-03-05 LAB — INR: 3.6 (ref 0.8–1.2)

## 2018-03-05 NOTE — TELEPHONE ENCOUNTER
Pt is scheduled for a blood draw on Wed, Mar 7. He is also seeing . Does HH need to send a phlebotomist to pt's home, or blood will be drawn at the office/building. OK to leave a voice message on the machine.     Tasked high to Nursing

## 2018-03-06 NOTE — TELEPHONE ENCOUNTER
Spoke with patient and instructed him to come about 15 minutes early for his appt tomorrow for lab work. Instructed he does not need to fast. Reviewed appt time.  Patient verbalized understanding and states he will call back with any questions because he ju

## 2018-03-06 NOTE — TELEPHONE ENCOUNTER
Noted.  The BMP is fine for now. Thank you for asking. I will route this to nursing as an FYI.   Thank you!!

## 2018-03-06 NOTE — TELEPHONE ENCOUNTER
Called and spoke to Susan with [facility] and notified her she can cancel phlebotomist for March 7th and that patient can have his blood test done in our lab prior to seeing Dr Perez Files on March 7th. She states they were supposed to draw a BMP.  I ordered a BMP

## 2018-03-06 NOTE — TELEPHONE ENCOUNTER
Noted. Please notify home health and the patient that he can have his blood test done in our lab prior to seeing me on the day of his office visit, March 7. I will route this to nursing.   Thank you!!

## 2018-03-07 ENCOUNTER — OFFICE VISIT (OUTPATIENT)
Dept: INTERNAL MEDICINE CLINIC | Facility: CLINIC | Age: 78
End: 2018-03-07

## 2018-03-07 VITALS
OXYGEN SATURATION: 95 % | DIASTOLIC BLOOD PRESSURE: 60 MMHG | TEMPERATURE: 98 F | SYSTOLIC BLOOD PRESSURE: 96 MMHG | BODY MASS INDEX: 22.62 KG/M2 | HEIGHT: 70 IN | HEART RATE: 72 BPM | WEIGHT: 158 LBS

## 2018-03-07 DIAGNOSIS — N18.30 CKD (CHRONIC KIDNEY DISEASE), STAGE III (HCC): ICD-10-CM

## 2018-03-07 DIAGNOSIS — R53.83 FATIGUE, UNSPECIFIED TYPE: ICD-10-CM

## 2018-03-07 DIAGNOSIS — Z79.01 ANTICOAGULATED ON COUMADIN: ICD-10-CM

## 2018-03-07 DIAGNOSIS — N18.30 ANEMIA IN STAGE 3 CHRONIC KIDNEY DISEASE (HCC): ICD-10-CM

## 2018-03-07 DIAGNOSIS — Z95.810 ICD (IMPLANTABLE CARDIOVERTER-DEFIBRILLATOR) IN PLACE: ICD-10-CM

## 2018-03-07 DIAGNOSIS — E78.00 HYPERCHOLESTEREMIA: ICD-10-CM

## 2018-03-07 DIAGNOSIS — I42.9 CARDIOMYOPATHY, UNSPECIFIED TYPE (HCC): ICD-10-CM

## 2018-03-07 DIAGNOSIS — D63.1 ANEMIA IN STAGE 3 CHRONIC KIDNEY DISEASE (HCC): ICD-10-CM

## 2018-03-07 DIAGNOSIS — M05.79 SEROPOSITIVE RHEUMATOID ARTHRITIS OF MULTIPLE SITES (HCC): ICD-10-CM

## 2018-03-07 DIAGNOSIS — I10 ESSENTIAL HYPERTENSION: ICD-10-CM

## 2018-03-07 DIAGNOSIS — J43.9 PULMONARY EMPHYSEMA, UNSPECIFIED EMPHYSEMA TYPE (HCC): ICD-10-CM

## 2018-03-07 DIAGNOSIS — J11.1 INFLUENZA: ICD-10-CM

## 2018-03-07 DIAGNOSIS — D69.6 THROMBOCYTOPENIA (HCC): ICD-10-CM

## 2018-03-07 DIAGNOSIS — I50.23 ACUTE ON CHRONIC SYSTOLIC CONGESTIVE HEART FAILURE (HCC): Primary | ICD-10-CM

## 2018-03-07 PROCEDURE — 99496 TRANSJ CARE MGMT HIGH F2F 7D: CPT | Performed by: INTERNAL MEDICINE

## 2018-03-07 PROCEDURE — 1111F DSCHRG MED/CURRENT MED MERGE: CPT | Performed by: INTERNAL MEDICINE

## 2018-03-07 NOTE — PROGRESS NOTES
Zia Carter is a 76year old male. Patient presents with:  Hospital F/U  Checkup: Patient reports to be feeling tired and getting winded with acitvity; SOB with ambulation;  Congestive Heart Failure (cardiovascular)  Fatigue  Hypertension  Hyperlipidemi HYDRALAZINE HCL 25 MG Oral Tab TAKE 1 TABLET (25 MG TOTAL) BY MOUTH 2 (TWO) TIMES DAILY.  Disp: 180 tablet Rfl: 3   FOLIC ACID 1 MG Oral Tab TAKE ONE TABLET BY MOUTH EVERY DAY Disp: 90 tablet Rfl: 3   FINASTERIDE 5 MG Oral Tab TAKE 1 TABLET (5 MG TOTAL) B otherwise  RESPIRATORY:No cough or SOB  CARDIOVASCULAR: No chest pain  GI: No abdominal pain, nausea, vomiting, diarrhea, or constipation  :No Urinary complaints  EXT:No complaints of pain or swelling in patient's legs    EXAM:   BP 96/60 (BP Location: L Coumadin  Stable. CPM.    6. Thrombocytopenia (HCC)  Stable. CPM.    7. Hypercholesteremia  Stable. CPM.    8. CKD (chronic kidney disease), stage III  Stable. CPM.  As above. 9. Anemia in stage 3 chronic kidney disease  Stable. CPM.  As above. been reconciled with the patient's current medication list and reviewed by me. See medication list for additions of new medication, and changes to current doses of medications and discontinued medications.     HPI: Patient is a 43-year-old white male was h every day   carvedilol 25 MG Oral Tab Take 1 tablet (25 mg total) by mouth 2 (two) times daily with meals.  take 1 tablet (25MG)  by oral route 2 times every day with food   FLUOXETINE HCL 10 MG Oral Cap TAKE 1 CAPSULE BY MOUTH EVERY DAY   METHOTREXATE 2.5 ST  LUNGS: denies shortness of breath with exertion  CARDIOVASCULAR: denies chest pain on exertion or palpitations  GI: denies abdominal pain, denies heartburn, denies diarrhea  MUSCULOSKELETAL: denies pain, normal range of motion of extremities  NEURO: de METABOLIC PANEL (8); Future    Anemia in stage 3 chronic kidney disease  -     CBC WITH DIFFERENTIAL WITH PLATELET; Future    Essential hypertension    Fatigue, unspecified type  -     CBC WITH DIFFERENTIAL WITH PLATELET;  Future  -     BASIC METABOLIC PANE

## 2018-03-07 NOTE — PATIENT INSTRUCTIONS
1.  Patient is to continue his current diet, medication and activity. 2.  Patient is to resume taking Lasix 20 mg orally daily as necessary for leg swelling. 3.  I will plan see the patient back in 1 month with blood tests which include a CBC and BMP.   4

## 2018-03-13 ENCOUNTER — TELEPHONE (OUTPATIENT)
Dept: INTERNAL MEDICINE CLINIC | Facility: CLINIC | Age: 78
End: 2018-03-13

## 2018-03-13 DIAGNOSIS — I42.9 CARDIOMYOPATHY, UNSPECIFIED TYPE (HCC): ICD-10-CM

## 2018-03-13 LAB — INR: 1.2 (ref 0.8–1.2)

## 2018-03-13 NOTE — TELEPHONE ENCOUNTER
Nurse already did the fingerstick    INR   1.2    PT     14.4    Can not document results till she gets an order.

## 2018-03-13 NOTE — TELEPHONE ENCOUNTER
LM for [facility] x2;   Spoke to Jacy olivo to give coumadin instructions tonight---last week INR was being reported to wrong clinic  See [facility] note

## 2018-03-15 ENCOUNTER — TELEPHONE (OUTPATIENT)
Dept: INTERNAL MEDICINE CLINIC | Facility: CLINIC | Age: 78
End: 2018-03-15

## 2018-03-15 DIAGNOSIS — I42.9 CARDIOMYOPATHY, UNSPECIFIED TYPE (HCC): ICD-10-CM

## 2018-03-15 LAB
CHLORIDE: 97 MEQ/L
CHOLESTEROL, TOTAL: 104 MG/DL
GLUCOSE: 141 MG/DL
HDL CHOLESTEROL: 38 MG/DL
INR: 1.3 (ref 0.8–1.2)
LDL CHOLESTEROL: 52 MG/DL
MAGNESIUM: 2 MG/DL
NON-HDL CHOLESTEROL: 66 MG/DL
POTASSIUM, SERUM: 3.9 MEQ/L
SODIUM: 137 MEQ/L
TRIGLYCERIDES: 72 MG/DL

## 2018-03-16 ENCOUNTER — PRIOR ORIGINAL RECORDS (OUTPATIENT)
Dept: OTHER | Age: 78
End: 2018-03-16

## 2018-03-16 ENCOUNTER — TELEPHONE (OUTPATIENT)
Dept: INTERNAL MEDICINE CLINIC | Facility: CLINIC | Age: 78
End: 2018-03-16

## 2018-03-16 NOTE — TELEPHONE ENCOUNTER
1. Pt has Acute on Chronic Congestive Heart Failure  2. Pt does not have Atrial Fibrillation  3. Pt has an ICD and Cardiac Pacemaker. I will route this to nursing who can call [facility] Radha back with the information.

## 2018-03-16 NOTE — TELEPHONE ENCOUNTER
Tiffany Vera from [facility] called asking to have a nurse call her back. Not urgent but would not elaborate. She can be reached at [phone].

## 2018-03-16 NOTE — TELEPHONE ENCOUNTER
Jarret Caruso is requesting clarification on the following diagnoses for billing and coding purposes. Is the patient's heart failure acute or chronic? Does the patient have systolic or diastolic heart failure? Is the pt's a.fib. acute or chronic?  This RN informed h

## 2018-03-19 ENCOUNTER — TELEPHONE (OUTPATIENT)
Dept: INTERNAL MEDICINE CLINIC | Facility: CLINIC | Age: 78
End: 2018-03-19

## 2018-03-19 DIAGNOSIS — I42.9 CARDIOMYOPATHY, UNSPECIFIED TYPE (HCC): ICD-10-CM

## 2018-03-19 RX ORDER — PREDNISONE 1 MG/1
TABLET ORAL
Qty: 180 TABLET | Refills: 3 | Status: SHIPPED
Start: 2018-03-19 | End: 2019-04-11

## 2018-03-19 NOTE — TELEPHONE ENCOUNTER
Results  INR 3.5  PT 41.7    5mg Saturday and Sunday  7.5mg Thursday and Friday    Stu Molinan at [address] [phone]  May leave message, confidential vms.   Routed to Candance Hymen

## 2018-03-19 NOTE — TELEPHONE ENCOUNTER
From: Kavitha Bryan  Sent: 3/17/2018 1:00 PM CDT  Subject: Medication Renewal Request    Mirna Mauricio.  Nilesh Real would like a refill of the following medications:     predniSONE 1 MG Oral Tab [Panfilo Hernandez MD]    Preferred pharmacy: [address] [place] [address] [id number]

## 2018-03-19 NOTE — TELEPHONE ENCOUNTER
LOV:1-3-17  Last Filled: 1-3-17, #180 with 3 refills  Labs:   Future Appointments  Date Time Provider Yobani Trevino   4/11/2018 11:00 AM [facility]   5/10/2018 1:00 PM Nasreen [facility]   6/4/

## 2018-03-20 NOTE — TELEPHONE ENCOUNTER
Dr. Eliud Spencer message relayed to [address] [place] Magdy Mcgraw who verbalized understanding.

## 2018-03-20 NOTE — TELEPHONE ENCOUNTER
Joni Licea / [facility] [place] call (not available), please call [phone]    Tasked to nursing

## 2018-03-23 ENCOUNTER — TELEPHONE (OUTPATIENT)
Dept: INTERNAL MEDICINE CLINIC | Facility: CLINIC | Age: 78
End: 2018-03-23

## 2018-03-23 NOTE — TELEPHONE ENCOUNTER
Lilia from [facility]  called. She states she spoke with the pt's daughter. Daughter really concerned, over the last month she has seen a  decline in her Father's memory and mood changes. Pt. Has an upcoming appt. With Dr. Louis Velasquez. On 4-11-18.   She wanted Dr. Louis Velasquez to

## 2018-03-26 ENCOUNTER — TELEPHONE (OUTPATIENT)
Dept: INTERNAL MEDICINE CLINIC | Facility: CLINIC | Age: 78
End: 2018-03-26

## 2018-03-26 DIAGNOSIS — I42.9 CARDIOMYOPATHY, UNSPECIFIED TYPE (HCC): ICD-10-CM

## 2018-03-26 NOTE — TELEPHONE ENCOUNTER
Spoke to [facility] -- INR was a fingerstick;   Pt denies any bleeding or bruising;  Hold coumadin and recheck venipuncture INR tomorrow

## 2018-03-26 NOTE — TELEPHONE ENCOUNTER
PT - 65.2, INR - 5.4  Coumadin - 2.5 mg on Wed and Fri; 5 mg on all other days.     Tasked high to anti-coag

## 2018-03-27 ENCOUNTER — LAB REQUISITION (OUTPATIENT)
Dept: LAB | Facility: HOSPITAL | Age: 78
End: 2018-03-27
Payer: MEDICARE

## 2018-03-27 DIAGNOSIS — Z01.89 ENCOUNTER FOR OTHER SPECIFIED SPECIAL EXAMINATIONS: ICD-10-CM

## 2018-03-27 LAB
INR BLD: 4.3 (ref 0.9–1.2)
PROTHROMBIN TIME: 40.1 SECONDS (ref 11.8–14.5)

## 2018-03-27 PROCEDURE — 85610 PROTHROMBIN TIME: CPT

## 2018-03-27 NOTE — TELEPHONE ENCOUNTER
Spoke to Yanci Fleming today;   She has not drawn yet---she will venipuncture INR and instruct pt no coumadin tonight as we will likely not have results before COB   for Shahriar Todd

## 2018-03-28 ENCOUNTER — TELEPHONE (OUTPATIENT)
Dept: INTERNAL MEDICINE CLINIC | Facility: CLINIC | Age: 78
End: 2018-03-28

## 2018-03-28 NOTE — TELEPHONE ENCOUNTER
PT 40.1    INR 4.3    Coumadin was held since Monday    Please call Franky Avery at [phone] - ok to leave order on voicemail

## 2018-03-29 ENCOUNTER — TELEPHONE (OUTPATIENT)
Dept: INTERNAL MEDICINE CLINIC | Facility: CLINIC | Age: 78
End: 2018-03-29

## 2018-03-29 DIAGNOSIS — I42.9 CARDIOMYOPATHY, UNSPECIFIED TYPE (HCC): ICD-10-CM

## 2018-03-29 LAB — INR: 2.3 (ref 0.8–1.2)

## 2018-03-29 NOTE — TELEPHONE ENCOUNTER
Noted.  Discussed with visiting nurse. Patient's INR was 4.3. No Coumadin will be given today. Nurse will check INR tomorrow and contact Florencio Jett. I will forward this to Florencio Jett.   Thank you!!

## 2018-03-29 NOTE — TELEPHONE ENCOUNTER
Zehra Espinoza from [facility].. Is calling with results:    PT 27.2   INR 2.3   Pt.  Has been holding his Warfarin since Monday  Ok to leave orders on confidential v-mail  Ph. # [phone]   Routed to [facility] to [facility]

## 2018-04-03 NOTE — PROGRESS NOTES
Coty Quick is a 58-year-old patient of Dr. Jake Ramos with RF positive rheumatoid arthritis. Since I saw him January 3rd of 2017, he has remained on methotrexate 5 mg weekly and 2 mg of prednisone every morning.  He denies fatigue or stomach upset after taking 20 MG Oral Tab Take by mouth nightly. take 1 tablet (20MG)  by oral route  every day  Disp:  Rfl:    carvedilol (COREG) 25 MG Oral Tab Take  by mouth.  take 1 tablet (25MG)  by oral route 2 times every day with food Disp:  Rfl:    spironolactone (ALDACTONE)

## 2018-04-04 ENCOUNTER — OFFICE VISIT (OUTPATIENT)
Dept: RHEUMATOLOGY | Facility: CLINIC | Age: 78
End: 2018-04-04

## 2018-04-04 VITALS
HEIGHT: 68 IN | HEART RATE: 74 BPM | DIASTOLIC BLOOD PRESSURE: 63 MMHG | SYSTOLIC BLOOD PRESSURE: 112 MMHG | BODY MASS INDEX: 24.65 KG/M2 | WEIGHT: 162.63 LBS

## 2018-04-04 DIAGNOSIS — Z51.81 ENCOUNTER FOR THERAPEUTIC DRUG MONITORING: ICD-10-CM

## 2018-04-04 DIAGNOSIS — D63.1 ANEMIA IN STAGE 3 CHRONIC KIDNEY DISEASE (HCC): ICD-10-CM

## 2018-04-04 DIAGNOSIS — N18.30 ANEMIA IN STAGE 3 CHRONIC KIDNEY DISEASE (HCC): ICD-10-CM

## 2018-04-04 DIAGNOSIS — M05.79 SEROPOSITIVE RHEUMATOID ARTHRITIS OF MULTIPLE SITES (HCC): Primary | ICD-10-CM

## 2018-04-04 PROCEDURE — 99214 OFFICE O/P EST MOD 30 MIN: CPT | Performed by: INTERNAL MEDICINE

## 2018-04-04 PROCEDURE — G0463 HOSPITAL OUTPT CLINIC VISIT: HCPCS | Performed by: INTERNAL MEDICINE

## 2018-04-05 ENCOUNTER — TELEPHONE (OUTPATIENT)
Dept: INTERNAL MEDICINE CLINIC | Facility: CLINIC | Age: 78
End: 2018-04-05

## 2018-04-05 DIAGNOSIS — I42.9 CARDIOMYOPATHY, UNSPECIFIED TYPE (HCC): ICD-10-CM

## 2018-04-05 NOTE — TELEPHONE ENCOUNTER
Karlo Leung from [facility].. Is calling because she went to pt.'s home today. Pt.  Was out and won't be back until 4pm he is out with family she wants to know if she should go back at 4 pm or if she can get an order for PT/INR for tomorrow ok to leave mess

## 2018-04-06 ENCOUNTER — TELEPHONE (OUTPATIENT)
Dept: INTERNAL MEDICINE CLINIC | Facility: CLINIC | Age: 78
End: 2018-04-06

## 2018-04-06 DIAGNOSIS — I42.9 CARDIOMYOPATHY, UNSPECIFIED TYPE (HCC): ICD-10-CM

## 2018-04-06 NOTE — TELEPHONE ENCOUNTER
INR 1.9, PT 22.6, Coumadin 5 mg Mon & Fri, 2.5 mg all other days  Pt had a couple beers.     Tasked to coumadin high

## 2018-04-13 ENCOUNTER — TELEPHONE (OUTPATIENT)
Dept: INTERNAL MEDICINE CLINIC | Facility: CLINIC | Age: 78
End: 2018-04-13

## 2018-04-13 DIAGNOSIS — I42.9 CARDIOMYOPATHY, UNSPECIFIED TYPE (HCC): ICD-10-CM

## 2018-04-13 NOTE — TELEPHONE ENCOUNTER
Audrey Daniels from [facility] called to report a Protime. PT-   26.8  INR-  2.2    Patient takes 5 mgs coumadin Mon. Wed. And Fri./   2.5 mgs all other days.

## 2018-04-20 ENCOUNTER — TELEPHONE (OUTPATIENT)
Dept: INTERNAL MEDICINE CLINIC | Facility: CLINIC | Age: 78
End: 2018-04-20

## 2018-04-20 DIAGNOSIS — I42.9 CARDIOMYOPATHY, UNSPECIFIED TYPE (HCC): ICD-10-CM

## 2018-04-20 NOTE — TELEPHONE ENCOUNTER
Reporting PT & INR  PT 32.7  INR 2.7    Dosage Coumadin 5mg MWF,  2.5mg Sun T Th Sat  Audrey Daniels from [facility] [phone]  Routed to [address]

## 2018-04-25 ENCOUNTER — TELEPHONE (OUTPATIENT)
Dept: INTERNAL MEDICINE CLINIC | Facility: CLINIC | Age: 78
End: 2018-04-25

## 2018-04-25 NOTE — TELEPHONE ENCOUNTER
Lam Sharp from [facility] is calling to see if Dr. Erendira Hurst is going to re-certify pt.  For [facility]. Services  Ph. # [phone]     Routed to clinical

## 2018-04-26 ENCOUNTER — MYAURORA ACCOUNT LINK (OUTPATIENT)
Dept: OTHER | Age: 78
End: 2018-04-26

## 2018-04-26 ENCOUNTER — PRIOR ORIGINAL RECORDS (OUTPATIENT)
Dept: OTHER | Age: 78
End: 2018-04-26

## 2018-04-26 ENCOUNTER — HOSPITAL ENCOUNTER (OUTPATIENT)
Dept: GENERAL RADIOLOGY | Facility: HOSPITAL | Age: 78
Discharge: HOME | End: 2018-04-26
Attending: INTERNAL MEDICINE
Payer: MEDICARE

## 2018-04-26 ENCOUNTER — LAB ENCOUNTER (OUTPATIENT)
Dept: LAB | Facility: HOSPITAL | Age: 78
End: 2018-04-26
Attending: INTERNAL MEDICINE
Payer: MEDICARE

## 2018-04-26 DIAGNOSIS — R60.9 EDEMA: ICD-10-CM

## 2018-04-26 DIAGNOSIS — R60.9 EDEMA: Primary | ICD-10-CM

## 2018-04-26 PROCEDURE — 83880 ASSAY OF NATRIURETIC PEPTIDE: CPT

## 2018-04-26 PROCEDURE — 36415 COLL VENOUS BLD VENIPUNCTURE: CPT

## 2018-04-26 PROCEDURE — 80048 BASIC METABOLIC PNL TOTAL CA: CPT

## 2018-04-26 PROCEDURE — 71046 X-RAY EXAM CHEST 2 VIEWS: CPT | Performed by: INTERNAL MEDICINE

## 2018-04-26 PROCEDURE — 85025 COMPLETE CBC W/AUTO DIFF WBC: CPT

## 2018-04-26 NOTE — TELEPHONE ENCOUNTER
Noted. Please call Ryan Conteh from [facility] back in notify her that I will recertify the patient as necessary. I will route this to nursing.   Thank you!!

## 2018-04-26 NOTE — TELEPHONE ENCOUNTER
Called and relayed MDs message on [facility] [address]) confidential VM. Instructed to call back with any questions.

## 2018-04-27 ENCOUNTER — PRIOR ORIGINAL RECORDS (OUTPATIENT)
Dept: OTHER | Age: 78
End: 2018-04-27

## 2018-04-27 LAB
BNP: 835 PMOL/L
BUN: 20 MG/DL
CALCIUM: 8.9 MG/DL
CHLORIDE: 105 MEQ/L
CREATININE, SERUM: 1.34 MG/DL
GLUCOSE: 100 MG/DL
HEMATOCRIT: 31.4 %
HEMOGLOBIN: 10.6 G/DL
PLATELETS: 99 K/UL
POTASSIUM, SERUM: 4.3 MEQ/L
RED BLOOD COUNT: 3.29 X 10-6/U
SODIUM: 138 MEQ/L
WHITE BLOOD COUNT: 5.7 X 10-3/U

## 2018-05-02 ENCOUNTER — PRIOR ORIGINAL RECORDS (OUTPATIENT)
Dept: OTHER | Age: 78
End: 2018-05-02

## 2018-05-04 ENCOUNTER — TELEPHONE (OUTPATIENT)
Dept: INTERNAL MEDICINE CLINIC | Facility: CLINIC | Age: 78
End: 2018-05-04

## 2018-05-04 DIAGNOSIS — I42.9 CARDIOMYOPATHY, UNSPECIFIED TYPE (HCC): ICD-10-CM

## 2018-05-04 NOTE — TELEPHONE ENCOUNTER
Called Doris from [facility] and relayed DR. CERVANTES message - left on Vm , also LMTCB so we know she received this message [facility] template updated

## 2018-05-04 NOTE — TELEPHONE ENCOUNTER
PT 17.7  INR 1.5  Dosage:  5MG Mon, Wed and Fri  2.5MG Sun, Tues, Thurs and Sat  Pt reported that he missed a few doses  Tasked to Dr Carter Lilia

## 2018-05-04 NOTE — TELEPHONE ENCOUNTER
Nursing please call patient, let him know that I would recommend he takes 5 mg dosages tonight and tomorrow, and returning to the same previous scheduled dosing starting Sunday. Repeat the INR in 1 week, and try to encourage some complaints.

## 2018-05-06 RX ORDER — FLUOXETINE 10 MG/1
CAPSULE ORAL
Qty: 90 CAPSULE | Refills: 0 | Status: SHIPPED | OUTPATIENT
Start: 2018-05-06 | End: 2018-08-07

## 2018-05-08 NOTE — TELEPHONE ENCOUNTER
Called Nallely Esteves and LMTCB. Also spoke with patient, but he was confused by instructions and preferred instructions go through [facility] nurse, so that she can manage medication changes. He was unsure of date of last [facility] RN visit.

## 2018-05-10 ENCOUNTER — OFFICE VISIT (OUTPATIENT)
Dept: INTERNAL MEDICINE CLINIC | Facility: CLINIC | Age: 78
End: 2018-05-10

## 2018-05-10 VITALS
DIASTOLIC BLOOD PRESSURE: 60 MMHG | TEMPERATURE: 98 F | OXYGEN SATURATION: 96 % | HEIGHT: 68 IN | BODY MASS INDEX: 24.76 KG/M2 | HEART RATE: 72 BPM | SYSTOLIC BLOOD PRESSURE: 110 MMHG | WEIGHT: 163.38 LBS

## 2018-05-10 DIAGNOSIS — I42.9 CARDIOMYOPATHY, UNSPECIFIED TYPE (HCC): ICD-10-CM

## 2018-05-10 DIAGNOSIS — E78.00 HYPERCHOLESTEREMIA: ICD-10-CM

## 2018-05-10 DIAGNOSIS — M05.79 SEROPOSITIVE RHEUMATOID ARTHRITIS OF MULTIPLE SITES (HCC): ICD-10-CM

## 2018-05-10 DIAGNOSIS — N18.30 CKD (CHRONIC KIDNEY DISEASE), STAGE III (HCC): ICD-10-CM

## 2018-05-10 DIAGNOSIS — D69.6 THROMBOCYTOPENIA (HCC): ICD-10-CM

## 2018-05-10 DIAGNOSIS — Z79.01 ANTICOAGULATED ON COUMADIN: ICD-10-CM

## 2018-05-10 DIAGNOSIS — J43.9 PULMONARY EMPHYSEMA, UNSPECIFIED EMPHYSEMA TYPE (HCC): ICD-10-CM

## 2018-05-10 DIAGNOSIS — D63.1 ANEMIA IN STAGE 3 CHRONIC KIDNEY DISEASE (HCC): ICD-10-CM

## 2018-05-10 DIAGNOSIS — I10 ESSENTIAL HYPERTENSION: ICD-10-CM

## 2018-05-10 DIAGNOSIS — R53.83 FATIGUE, UNSPECIFIED TYPE: ICD-10-CM

## 2018-05-10 DIAGNOSIS — Z95.810 ICD (IMPLANTABLE CARDIOVERTER-DEFIBRILLATOR) IN PLACE: ICD-10-CM

## 2018-05-10 DIAGNOSIS — I50.23 ACUTE ON CHRONIC SYSTOLIC CONGESTIVE HEART FAILURE (HCC): Primary | ICD-10-CM

## 2018-05-10 DIAGNOSIS — N18.30 ANEMIA IN STAGE 3 CHRONIC KIDNEY DISEASE (HCC): ICD-10-CM

## 2018-05-10 PROBLEM — I50.9 CHF (CONGESTIVE HEART FAILURE) (HCC): Status: RESOLVED | Noted: 2018-02-27 | Resolved: 2018-05-10

## 2018-05-10 PROCEDURE — G0463 HOSPITAL OUTPT CLINIC VISIT: HCPCS | Performed by: INTERNAL MEDICINE

## 2018-05-10 PROCEDURE — 99214 OFFICE O/P EST MOD 30 MIN: CPT | Performed by: INTERNAL MEDICINE

## 2018-05-10 RX ORDER — FUROSEMIDE 20 MG/1
TABLET ORAL
Qty: 90 TABLET | Refills: 3 | Status: ON HOLD | OUTPATIENT
Start: 2018-05-10 | End: 2018-06-22

## 2018-05-10 NOTE — PATIENT INSTRUCTIONS
1.  Patient is to continue his current diet, medication and activity. 2.  Patient is to take Lasix 20 mg orally 3 days a week, on Monday Wednesday and Friday. 3.  I will plan to see the patient back in 1 month with a CBC and BMP.   4.  I will see the italo

## 2018-05-10 NOTE — PROGRESS NOTES
Etienne Jackson is a [de-identified]year old male. Patient presents with:  Congestive Heart Failure (cardiovascular)  Fatigue  Hypertension  Hyperlipidemia    HPI:   Patient presents with:  Congestive Heart Failure (cardiovascular)  Fatigue  Hypertension  Hyperlipidemi mg total) by mouth nightly. take 1 tablet (20MG)  by oral route  every day Disp: 1 tablet Rfl: 0   carvedilol 25 MG Oral Tab Take 1 tablet (25 mg total) by mouth 2 (two) times daily with meals.  take 1 tablet (25MG)  by oral route 2 times every day with caitlin organomegaly or palpable masses  EXTREMITIES: no edema. NEURO: alert and oriented  ASSESSMENT AND PLAN:   1. Acute on chronic systolic congestive heart failure [facility])  Patient appears to be doing better today.   He appears to need some Lasix to help keep hi

## 2018-05-11 ENCOUNTER — TELEPHONE (OUTPATIENT)
Dept: INTERNAL MEDICINE CLINIC | Facility: CLINIC | Age: 78
End: 2018-05-11

## 2018-05-11 NOTE — TELEPHONE ENCOUNTER
Cecilia Finn from [facility] called with PT/INR results. PT-26.3  INR- 2.2    Currently taking 5 mgs Mondays, Wednesdays and Fridays                          2.5 mgs all other days. [facility] can be reached at [phone].

## 2018-05-25 ENCOUNTER — TELEPHONE (OUTPATIENT)
Dept: INTERNAL MEDICINE CLINIC | Facility: CLINIC | Age: 78
End: 2018-05-25

## 2018-05-25 NOTE — TELEPHONE ENCOUNTER
Louann Diaz from [facility]. Is calling with results:  PT 34.9    INR 2.9    Pt. Takes 5 mg of coumadin Mon, Wed, Fri. & 2.5 mg of coumadin all other days  ph.  #  [phone]   Routed high to [facility]

## 2018-06-04 ENCOUNTER — OFFICE VISIT (OUTPATIENT)
Dept: HEMATOLOGY/ONCOLOGY | Facility: HOSPITAL | Age: 78
End: 2018-06-04
Attending: INTERNAL MEDICINE
Payer: MEDICARE

## 2018-06-04 VITALS
TEMPERATURE: 98 F | DIASTOLIC BLOOD PRESSURE: 75 MMHG | SYSTOLIC BLOOD PRESSURE: 156 MMHG | HEIGHT: 68 IN | BODY MASS INDEX: 25.1 KG/M2 | HEART RATE: 75 BPM | RESPIRATION RATE: 18 BRPM | OXYGEN SATURATION: 98 % | WEIGHT: 165.63 LBS

## 2018-06-04 DIAGNOSIS — D69.6 THROMBOCYTOPENIA (HCC): ICD-10-CM

## 2018-06-04 DIAGNOSIS — D63.1 ANEMIA IN STAGE 3 CHRONIC KIDNEY DISEASE (HCC): ICD-10-CM

## 2018-06-04 DIAGNOSIS — N18.30 ANEMIA IN STAGE 3 CHRONIC KIDNEY DISEASE (HCC): ICD-10-CM

## 2018-06-04 PROCEDURE — 99213 OFFICE O/P EST LOW 20 MIN: CPT | Performed by: INTERNAL MEDICINE

## 2018-06-04 NOTE — PROGRESS NOTES
HPI       Darshan Mercado is a [de-identified]year old male here for f/u of Anemia in stage 3 chronic kidney disease  Thrombocytopenia (hcc)     Pt here for follow up. His wife recently moved to [facility] with Hospice.       He was admitted since last light-headedness, numbness and headaches. Hematological: Negative for adenopathy. Bruises/bleeds easily (on coumadin and prednisone). Psychiatric/Behavioral: Positive for sleep disturbance. The patient is not nervous/anxious. Denies depression. angioedema    Past Medical History:   Diagnosis Date   • Anemia    • Cardiomyopathy ([facility]    • CHF (congestive heart failure) ([facility]    • Encounter for long-term (current) use of non-steroidal anti-inflammatories    • Hyperlipidemia     High TGs   • Hyperte oz)  03/07/18 : 71.7 kg (158 lb)  03/01/18 : 70.5 kg (155 lb 6.4 oz)  02/05/18 : 76.8 kg (169 lb 6.4 oz)    PHYSICAL EXAM:    Physical Exam   Constitutional: He is oriented to person, place, and time. He appears well-developed and well-nourished.    overwei behavior is normal. Judgment normal.   Vitals reviewed. ASSESSMENT/PLAN:   Anemia in stage 3 chronic kidney disease  Thrombocytopenia (hcc)  Anemia: likely due to CKD stage III. Hemoglobin has been stable in his range. .  Renal function stable. Monocytes %      % 10 7 3 16   Eosinophils %      % 2 0 0 1   Basophils %      % 0 0 0 1   Neutrophils Absolute      1.8 - 7.7 K/UL 4.7 4.8 1.8 2.3   Lymphocytes Absolute      1.0 - 4.0 K/UL 0.3 (L) 0.2 (L) 0.2 (L) 0.2 (L)   Monocytes Absolute      0.0 -

## 2018-06-18 ENCOUNTER — LAB ENCOUNTER (OUTPATIENT)
Dept: LAB | Age: 78
DRG: 291 | End: 2018-06-18
Attending: INTERNAL MEDICINE
Payer: MEDICARE

## 2018-06-18 DIAGNOSIS — D69.6 THROMBOCYTOPENIA (HCC): ICD-10-CM

## 2018-06-18 DIAGNOSIS — D63.1 ANEMIA IN STAGE 3 CHRONIC KIDNEY DISEASE (HCC): ICD-10-CM

## 2018-06-18 DIAGNOSIS — N18.30 CKD (CHRONIC KIDNEY DISEASE), STAGE III (HCC): ICD-10-CM

## 2018-06-18 DIAGNOSIS — N18.30 ANEMIA IN STAGE 3 CHRONIC KIDNEY DISEASE (HCC): ICD-10-CM

## 2018-06-18 DIAGNOSIS — N18.30 CHRONIC KIDNEY DISEASE, STAGE III (MODERATE) (HCC): ICD-10-CM

## 2018-06-18 DIAGNOSIS — R53.83 FATIGUE, UNSPECIFIED TYPE: ICD-10-CM

## 2018-06-18 PROCEDURE — 85025 COMPLETE CBC W/AUTO DIFF WBC: CPT

## 2018-06-18 PROCEDURE — 80048 BASIC METABOLIC PNL TOTAL CA: CPT

## 2018-06-18 PROCEDURE — 36415 COLL VENOUS BLD VENIPUNCTURE: CPT

## 2018-06-20 ENCOUNTER — APPOINTMENT (OUTPATIENT)
Dept: GENERAL RADIOLOGY | Facility: HOSPITAL | Age: 78
DRG: 291 | End: 2018-06-20
Attending: EMERGENCY MEDICINE
Payer: MEDICARE

## 2018-06-20 ENCOUNTER — OFFICE VISIT (OUTPATIENT)
Dept: INTERNAL MEDICINE CLINIC | Facility: CLINIC | Age: 78
End: 2018-06-20

## 2018-06-20 ENCOUNTER — HOSPITAL ENCOUNTER (INPATIENT)
Facility: HOSPITAL | Age: 78
LOS: 2 days | Discharge: HOME HEALTH | DRG: 291 | End: 2018-06-22
Attending: EMERGENCY MEDICINE | Admitting: HOSPITALIST
Payer: MEDICARE

## 2018-06-20 VITALS
BODY MASS INDEX: 26 KG/M2 | DIASTOLIC BLOOD PRESSURE: 76 MMHG | HEART RATE: 84 BPM | SYSTOLIC BLOOD PRESSURE: 140 MMHG | OXYGEN SATURATION: 95 % | WEIGHT: 168 LBS | TEMPERATURE: 98 F

## 2018-06-20 DIAGNOSIS — E78.00 HYPERCHOLESTEREMIA: ICD-10-CM

## 2018-06-20 DIAGNOSIS — D63.1 ANEMIA IN STAGE 3 CHRONIC KIDNEY DISEASE (HCC): ICD-10-CM

## 2018-06-20 DIAGNOSIS — I10 ESSENTIAL HYPERTENSION: ICD-10-CM

## 2018-06-20 DIAGNOSIS — Z79.01 ANTICOAGULATED ON COUMADIN: ICD-10-CM

## 2018-06-20 DIAGNOSIS — I50.23 ACUTE ON CHRONIC SYSTOLIC CONGESTIVE HEART FAILURE (HCC): Primary | ICD-10-CM

## 2018-06-20 DIAGNOSIS — N18.30 CKD (CHRONIC KIDNEY DISEASE), STAGE III (HCC): ICD-10-CM

## 2018-06-20 DIAGNOSIS — I42.9 CARDIOMYOPATHY, UNSPECIFIED TYPE (HCC): ICD-10-CM

## 2018-06-20 DIAGNOSIS — I50.9 CONGESTIVE HEART FAILURE, UNSPECIFIED HF CHRONICITY, UNSPECIFIED HEART FAILURE TYPE (HCC): ICD-10-CM

## 2018-06-20 DIAGNOSIS — J44.1 ACUTE EXACERBATION OF CHRONIC OBSTRUCTIVE PULMONARY DISEASE (COPD) (HCC): ICD-10-CM

## 2018-06-20 DIAGNOSIS — M05.79 SEROPOSITIVE RHEUMATOID ARTHRITIS OF MULTIPLE SITES (HCC): ICD-10-CM

## 2018-06-20 DIAGNOSIS — R06.03 ACUTE RESPIRATORY DISTRESS: Primary | ICD-10-CM

## 2018-06-20 DIAGNOSIS — N18.30 ANEMIA IN STAGE 3 CHRONIC KIDNEY DISEASE (HCC): ICD-10-CM

## 2018-06-20 DIAGNOSIS — D69.6 THROMBOCYTOPENIA (HCC): ICD-10-CM

## 2018-06-20 DIAGNOSIS — R53.83 FATIGUE, UNSPECIFIED TYPE: ICD-10-CM

## 2018-06-20 DIAGNOSIS — Z95.810 ICD (IMPLANTABLE CARDIOVERTER-DEFIBRILLATOR) IN PLACE: ICD-10-CM

## 2018-06-20 DIAGNOSIS — J43.9 PULMONARY EMPHYSEMA, UNSPECIFIED EMPHYSEMA TYPE (HCC): ICD-10-CM

## 2018-06-20 PROCEDURE — 99214 OFFICE O/P EST MOD 30 MIN: CPT | Performed by: INTERNAL MEDICINE

## 2018-06-20 PROCEDURE — 99223 1ST HOSP IP/OBS HIGH 75: CPT | Performed by: HOSPITALIST

## 2018-06-20 PROCEDURE — G0463 HOSPITAL OUTPT CLINIC VISIT: HCPCS | Performed by: INTERNAL MEDICINE

## 2018-06-20 PROCEDURE — 71045 X-RAY EXAM CHEST 1 VIEW: CPT | Performed by: EMERGENCY MEDICINE

## 2018-06-20 RX ORDER — DOCUSATE SODIUM 100 MG/1
100 CAPSULE, LIQUID FILLED ORAL 2 TIMES DAILY
Status: DISCONTINUED | OUTPATIENT
Start: 2018-06-20 | End: 2018-06-22

## 2018-06-20 RX ORDER — FINASTERIDE 5 MG/1
5 TABLET, FILM COATED ORAL DAILY
Status: DISCONTINUED | OUTPATIENT
Start: 2018-06-21 | End: 2018-06-22

## 2018-06-20 RX ORDER — FUROSEMIDE 10 MG/ML
40 INJECTION INTRAMUSCULAR; INTRAVENOUS ONCE
Status: COMPLETED | OUTPATIENT
Start: 2018-06-20 | End: 2018-06-20

## 2018-06-20 RX ORDER — SODIUM PHOSPHATE, DIBASIC AND SODIUM PHOSPHATE, MONOBASIC 7; 19 G/133ML; G/133ML
1 ENEMA RECTAL ONCE AS NEEDED
Status: DISCONTINUED | OUTPATIENT
Start: 2018-06-20 | End: 2018-06-22

## 2018-06-20 RX ORDER — IPRATROPIUM BROMIDE AND ALBUTEROL SULFATE 2.5; .5 MG/3ML; MG/3ML
3 SOLUTION RESPIRATORY (INHALATION) ONCE
Status: COMPLETED | OUTPATIENT
Start: 2018-06-20 | End: 2018-06-20

## 2018-06-20 RX ORDER — SPIRONOLACTONE 25 MG/1
25 TABLET ORAL DAILY
Status: DISCONTINUED | OUTPATIENT
Start: 2018-06-20 | End: 2018-06-22

## 2018-06-20 RX ORDER — POLYETHYLENE GLYCOL 3350 17 G/17G
17 POWDER, FOR SOLUTION ORAL DAILY PRN
Status: DISCONTINUED | OUTPATIENT
Start: 2018-06-20 | End: 2018-06-22

## 2018-06-20 RX ORDER — SODIUM CHLORIDE 0.9 % (FLUSH) 0.9 %
10 SYRINGE (ML) INJECTION AS NEEDED
Status: DISCONTINUED | OUTPATIENT
Start: 2018-06-20 | End: 2018-06-22

## 2018-06-20 RX ORDER — SODIUM CHLORIDE 0.9 % (FLUSH) 0.9 %
3 SYRINGE (ML) INJECTION AS NEEDED
Status: DISCONTINUED | OUTPATIENT
Start: 2018-06-20 | End: 2018-06-22

## 2018-06-20 RX ORDER — FLUOXETINE 10 MG/1
10 CAPSULE ORAL
Status: DISCONTINUED | OUTPATIENT
Start: 2018-06-21 | End: 2018-06-22

## 2018-06-20 RX ORDER — NITROGLYCERIN 0.4 MG/1
0.4 TABLET SUBLINGUAL EVERY 5 MIN PRN
Status: DISCONTINUED | OUTPATIENT
Start: 2018-06-20 | End: 2018-06-22

## 2018-06-20 RX ORDER — BISACODYL 10 MG
10 SUPPOSITORY, RECTAL RECTAL
Status: DISCONTINUED | OUTPATIENT
Start: 2018-06-20 | End: 2018-06-22

## 2018-06-20 RX ORDER — WARFARIN SODIUM 2.5 MG/1
2.5 TABLET ORAL NIGHTLY
Status: DISCONTINUED | OUTPATIENT
Start: 2018-06-20 | End: 2018-06-22

## 2018-06-20 RX ORDER — HYDRALAZINE HYDROCHLORIDE 25 MG/1
25 TABLET, FILM COATED ORAL 2 TIMES DAILY
Status: DISCONTINUED | OUTPATIENT
Start: 2018-06-20 | End: 2018-06-22

## 2018-06-20 RX ORDER — ONDANSETRON 2 MG/ML
4 INJECTION INTRAMUSCULAR; INTRAVENOUS EVERY 6 HOURS PRN
Status: DISCONTINUED | OUTPATIENT
Start: 2018-06-20 | End: 2018-06-22

## 2018-06-20 RX ORDER — FOLIC ACID 1 MG/1
1 TABLET ORAL
Status: DISCONTINUED | OUTPATIENT
Start: 2018-06-21 | End: 2018-06-22

## 2018-06-20 RX ORDER — ATORVASTATIN CALCIUM 20 MG/1
20 TABLET, FILM COATED ORAL NIGHTLY
Status: DISCONTINUED | OUTPATIENT
Start: 2018-06-20 | End: 2018-06-22

## 2018-06-20 RX ORDER — FUROSEMIDE 10 MG/ML
40 INJECTION INTRAMUSCULAR; INTRAVENOUS
Status: DISCONTINUED | OUTPATIENT
Start: 2018-06-20 | End: 2018-06-22

## 2018-06-20 RX ORDER — CARVEDILOL 25 MG/1
25 TABLET ORAL 2 TIMES DAILY WITH MEALS
Status: DISCONTINUED | OUTPATIENT
Start: 2018-06-20 | End: 2018-06-22

## 2018-06-20 RX ORDER — ISOSORBIDE MONONITRATE 30 MG/1
60 TABLET, EXTENDED RELEASE ORAL
Status: DISCONTINUED | OUTPATIENT
Start: 2018-06-21 | End: 2018-06-22

## 2018-06-20 RX ORDER — PREDNISONE 1 MG/1
2 TABLET ORAL
Status: DISCONTINUED | OUTPATIENT
Start: 2018-06-21 | End: 2018-06-22

## 2018-06-20 NOTE — ED PROVIDER NOTES
Patient Seen in: [facility] AND [facility] Emergency Department    History   Patient presents with:  Dyspnea JANELLE SOB (respiratory)    Stated Complaint:     HPI    Patient is an [de-identified] male with a history of cardiomyopathy CHF and COPD he complains of shortn 150/68  Pulse: 70  Resp: 20  Temp: 97.8 °F (36.6 °C)  Temp src: Oral  SpO2: 98 %  O2 Device: None (Room air)    Current:BP (!) 179/90   Pulse 70   Temp 97.8 °F (36.6 °C) (Oral)   Resp (!) 36   Ht 177.8 cm (5' 10\")   Wt 76.2 kg   SpO2 98%   BMI 24.11 kg/m² TIME (PT) - Abnormal; Notable for the following:     PT 27.6 (*)     INR 2.7 (*)     All other components within normal limits   CBC W/ DIFFERENTIAL - Abnormal; Notable for the following:     RBC 3.71 (*)     HGB 11.8 (*)     HCT 35.0 (*)     RDW 18.1 (*) Impression:  Acute respiratory distress  (primary encounter diagnosis)  Congestive heart failure, unspecified HF chronicity, unspecified heart failure type (HCC)  Acute exacerbation of chronic obstructive pulmonary disease (COPD) ([facility]    Disposition:  Adm

## 2018-06-20 NOTE — HISTORICAL OFFICE NOTE
Piter Lugo  : 1940  ACCOUNT:  [id number]  [phone]  PCP: Dr. Violette Jones     TODAY'S DATE: 2018  DICTATED BY:  MARGUERITE Marie]      CHIEF COMPLAINT: [Followup of Dyspnea, Followup of Heart failure, systolic and chronic.]    HP EXERCISE: walks 3X per week. DIET: low sodium diet. MARITAL STATUS: . OCCUPATION: part-time .      ALLERGIES: Lisinopril - Oral, Angio Edema, Losartan Potassium - Oral and Angio edema    MEDICATIONS: Selected prescriptions see below    VIT weight and symptoms see see pulmonary if breathing does not improve and labs do not indicate heart failure see Dr. Jaden Beltre and ICD office as scheduled.'s 0]    PRESCRIPTIONS:   04/26/18 *Furosemide           20MG      1 TABLET TWICE DAILY.

## 2018-06-20 NOTE — PROGRESS NOTES
[facility] is a [de-identified]year old male. Patient presents with:  Checkup: 1 mo f/u  Congestive Heart Failure (cardiovascular)  Fatigue  Hypertension  Hyperlipidemia    HPI:   Patient presents with:  Checkup: 1 mo f/u  Congestive Heart Failure (cardiovascular) (ALDACTONE) 25 MG Oral Tab Take 25 mg by mouth daily.  Disp:  Rfl:    ISOSORBIDE MONONITRATE 30 MG OR TB24 2 TABLETS DAILY Disp:  Rfl:       Past Medical History:   Diagnosis Date   • Anemia    • Cardiomyopathy ([facility]    • CHF (congestive heart failure) ([facility]. exacerbation of his congestive heart failure or exacerbation of his COPD or combination of both.   I am sending the patient to the emergency room for evaluation and probable admission to the hospital.  I have contacted the emergency room and discussed the s

## 2018-06-20 NOTE — CONSULTS
[facility] HOSP - [facility]    Report of Cardiology Consultation    Terrell Veloz Patient Status:  Inpatient    1940 MRN [id number]   Location [facility] 3W/SW Attending Rani Marroquin MD   Hosp Day # 0 PCP Darlin Coffman MD     Date [facility])    • Renal insufficiency        Past Surgical History  Past Surgical History:  No date: OTHER SURGICAL HISTORY      Comment: ICD  August 2016: OTHER SURGICAL HISTORY      Comment: cystoscopy, bladder washings for cytology  No date: PACEMAKER/DEFIBRIL Oral Tab TAKE TWO TABLETS (5MG) BY MOUTH ONCE WEEKLY.    predniSONE 1 MG Oral Tab TAKE 2 TABLETS BY MOUTH EVERY MORNING (Patient taking differently: Take 2 mg by mouth daily with breakfast. TAKE 2 TABLETS BY MOUTH EVERY MORNING )   Warfarin Sodium (Clint Efrain BS-present. Extremities: Without clubbing, cyanosis. Peripheral pulsespresent. Neurologic: Alert and oriented, normal affect. Motor ok. Skin: Warm and dry.      Results:     Laboratory Data:    Lab Results  Component Value Date   WBC 5.1 06/20/2018   HG

## 2018-06-20 NOTE — PATIENT INSTRUCTIONS
1.  Patient is to go to the emergency room for evaluation. Patient will need a chest x-ray and blood test.  He will most likely need to be admitted for evaluation due to an exacerbation of his COPD and congestive heart failure.

## 2018-06-20 NOTE — H&P
[facility]    PATIENT'S NAME: Velasquezclive Eloiseelio   ATTENDING PHYSICIAN: Meri Salinas MD   PATIENT ACCOUNT#:   [id number]    LOCATION:  [place] [address]  MEDICAL RECORD #:   [id number]       YOB: 1940  ADMISSION DATE:       06/20/2 Hard of hearing. Visibly short of breath. VITAL SIGNS:  Temperature 97.8, pulse 70, respiratory rate 36, blood pressure 179/90, pulse oximetry 98% on room air. HEENT:  Atraumatic. Oropharynx clear. Dry mucous membranes. Normal hard and soft palate.

## 2018-06-21 ENCOUNTER — TELEPHONE (OUTPATIENT)
Dept: INTERNAL MEDICINE CLINIC | Facility: CLINIC | Age: 78
End: 2018-06-21

## 2018-06-21 ENCOUNTER — APPOINTMENT (OUTPATIENT)
Dept: CV DIAGNOSTICS | Facility: HOSPITAL | Age: 78
DRG: 291 | End: 2018-06-21
Attending: INTERNAL MEDICINE
Payer: MEDICARE

## 2018-06-21 PROCEDURE — 99233 SBSQ HOSP IP/OBS HIGH 50: CPT | Performed by: HOSPITALIST

## 2018-06-21 PROCEDURE — 93306 TTE W/DOPPLER COMPLETE: CPT | Performed by: INTERNAL MEDICINE

## 2018-06-21 RX ORDER — MAGNESIUM OXIDE 400 MG (241.3 MG MAGNESIUM) TABLET
400 TABLET ONCE
Status: COMPLETED | OUTPATIENT
Start: 2018-06-21 | End: 2018-06-21

## 2018-06-21 RX ORDER — POTASSIUM CHLORIDE 20 MEQ/1
40 TABLET, EXTENDED RELEASE ORAL EVERY 4 HOURS
Status: COMPLETED | OUTPATIENT
Start: 2018-06-21 | End: 2018-06-21

## 2018-06-21 NOTE — PROGRESS NOTES
[facility] Heart Cardiology Progress Note      Angelina Pope Patient Status:  Inpatient    1940 MRN [id number]   Location [facility] 3W/SW Attending Bartolo Bamberger, MD   Hosp Day # 1 PCP Theron Wright MD JVD  Pulm: Lungs clear but decreased on right, normal respiratory effort  CV:  Heart with regular rate and regular rhythm, Nl S1,S2 ,no S3 or murmur  Abd: Abdomen soft, nontender, nondistended, no organomegaly, bowel sounds present  Ext:  no clubbing, no c interstitial edema, and a small to moderate right pleural effusion. Findings are similar to perhaps minimally improved since 4/26/2018. 2. Associated right basilar hazy airspace disease, which may reflect compressive atelectasis or superimposed pneumonia.

## 2018-06-21 NOTE — PROGRESS NOTES
[facility]  Progress Note     Agata Doctor  : 1940    Status: Inpatient  Day #: 1    Attending: Jesse Mccoy MD  PCP: Les Greenfield MD      Assessment and Plan     Acute on chronic systolic CHF  Nonischemic cardiomyopathy s/p A 29   GLU  95  85  87   MG   --    --   1.8   INR   --   2.7*  2.8*   TROP   --   0.04*   --        Xr Chest Ap Portable  (cpt=71045)    Result Date: 6/20/2018  CONCLUSION:  1.  Mild congestive failure including cardiomegaly, pulmonary vascular congestion,

## 2018-06-21 NOTE — TELEPHONE ENCOUNTER
Please call pt daughter Josue Tatum [phone] (with patient in office for appt yesterday) or pt daughter Elvira/power of  for pt/cell [phone]  Requesting letter from Dr Noy Hutchison for bank   Trying to help their folks with financials  Tasked to [employer]

## 2018-06-22 VITALS
SYSTOLIC BLOOD PRESSURE: 112 MMHG | DIASTOLIC BLOOD PRESSURE: 96 MMHG | TEMPERATURE: 97 F | WEIGHT: 154 LBS | HEART RATE: 60 BPM | BODY MASS INDEX: 22.05 KG/M2 | RESPIRATION RATE: 20 BRPM | HEIGHT: 70 IN | OXYGEN SATURATION: 99 %

## 2018-06-22 PROCEDURE — 99239 HOSP IP/OBS DSCHRG MGMT >30: CPT | Performed by: HOSPITALIST

## 2018-06-22 RX ORDER — TORSEMIDE 20 MG/1
20 TABLET ORAL DAILY
Qty: 30 TABLET | Refills: 2 | Status: SHIPPED | OUTPATIENT
Start: 2018-06-22 | End: 2018-10-17

## 2018-06-22 RX ORDER — POTASSIUM CHLORIDE 20 MEQ/1
40 TABLET, EXTENDED RELEASE ORAL ONCE
Status: COMPLETED | OUTPATIENT
Start: 2018-06-22 | End: 2018-06-22

## 2018-06-22 RX ORDER — TORSEMIDE 20 MG/1
20 TABLET ORAL DAILY
Qty: 30 TABLET | Refills: 2 | Status: SHIPPED | OUTPATIENT
Start: 2018-06-22 | End: 2018-06-22

## 2018-06-22 RX ORDER — TORSEMIDE 20 MG/1
20 TABLET ORAL DAILY
Status: DISCONTINUED | OUTPATIENT
Start: 2018-06-22 | End: 2018-06-22

## 2018-06-22 RX ORDER — IPRATROPIUM BROMIDE AND ALBUTEROL SULFATE 2.5; .5 MG/3ML; MG/3ML
3 SOLUTION RESPIRATORY (INHALATION) EVERY 6 HOURS PRN
Status: DISCONTINUED | OUTPATIENT
Start: 2018-06-22 | End: 2018-06-22

## 2018-06-22 NOTE — OCCUPATIONAL THERAPY NOTE
OCCUPATIONAL THERAPY EVALUATION - INPATIENT     Room Number: 930/124-R  Evaluation Date: 6/22/2018  Type of Evaluation: Initial       Physician Order: IP Consult to Occupational Therapy  Reason for Therapy: ADL/IADL Dysfunction and Discharge Planning DISCHARGE RECOMMENDATIONS  OT Discharge Recommendations: 24 hour care/supervision;Home with home health PT/OT       PLAN    Patient has been evaluated and presents with no skilled Occupational Therapy needs  at this time.   Patient will be discharged management; per daughter, patient was not regularly bathing.      SUBJECTIVE  \"I just want to go and see my wife\"    OCCUPATIONAL THERAPY EXAMINATION      OBJECTIVE  Precautions: Cardiac  Fall Risk: Standard fall risk    WEIGHT BEARING RESTRICTION good  Dynamic Sitting: fair  Static Standing: fair  Dynamic Standing: fair    FUNCTIONAL ADL ASSESSMENT  Grooming: Mod I   Feeding:  Mod I   Bathing: SPV  Toileting: SPV  Upper Extremity Dressing: SPV   Lower Extremity Dressing: SPV     Education Provided:

## 2018-06-22 NOTE — RESPIRATORY THERAPY NOTE
SHYANNE ASSESSMENT:    Pt does not have a previous diagnosis of SHYANNE. Pt does not routinely use a CPAP device at home. This pt is not suspected to be at high risk for SHYANNE and sleep lab packet was not provided to patient for outpatient follow-up.       RECOMENDAT

## 2018-06-22 NOTE — PLAN OF CARE
Problem: CARDIOVASCULAR - ADULT  Goal: Maintains optimal cardiac output and hemodynamic stability  INTERVENTIONS:  - Monitor vital signs, rhythm, and trends  - Monitor for bleeding, hypotension and signs of decreased cardiac output  - Evaluate effectivenes discharge; CHF clinic. Outcome: Progressing    Goal: Patient/Family Short Term Goal  Patient's Short Term Goal: Breathing better.;     Interventions:   - Give medications as ordered. - Monitor lab values.  Monitor weight;I&O   Outcome: Progressing

## 2018-06-22 NOTE — PHYSICAL THERAPY NOTE
PHYSICAL THERAPY EVALUATION - INPATIENT     Room Number: 121/768-J  Evaluation Date: 6/22/2018  Type of Evaluation: Initial   Physician Order: PT Eval and Treat    Presenting Problem: p/w SOB, dx acute on chronic CHF and COPD exacerbations  Reason for The D/c home with daughter with 24 hr supervision     PHYSICAL THERAPY MEDICAL/SOCIAL HISTORY     Problem List  Principal Problem:    Acute respiratory distress  Active Problems:    Acute exacerbation of chronic obstructive pulmonary disease (COPD) ([address]    Co ASSESSMENT  Rating: Other (Comment) (no c/o pain this session)          COGNITION  Overall Cognitive Status:  WFL - within functional limits   Tangential speech; verbal cues for re-direction to task    [address] close SBA without device; gait training with RW, pt progressed to supervision level     Bed Mobility: supine <> sit transfers at independent    Transfers: x 4 sit <> stand transfers with rolling walker at supervision; verbal cues for proper hand placement,

## 2018-06-22 NOTE — CM/SW NOTE
6/22/18 CM Discharge planning / MDO resume [address] [address] services    Pt is current with [facility], [facility] resume order on chart. Pt will be dischrging home later today.    Abdias Mueller X [phone]

## 2018-06-22 NOTE — PROGRESS NOTES
[facility] Heart Cardiology Progress Note      Lara Smart Patient Status:  Inpatient    1940 MRN [id number]   Location [facility] 3W/SW Attending Lucero Cabrera MD   Hosp Day # 2 PCP Sam Brady MD and oriented x3,   Neck:supple,no JVD  Pulm: Lungs rales on right side,  normal respiratory effort  CV:  Heart with regular rate and regular rhythm, Nl S1,S2 ,no S3 or murmur  Abd: Abdomen soft, nontender, nondistended, no organomegaly, bowel sounds presen (cpt=71045)    Result Date: 6/20/2018  CONCLUSION:  1. Mild congestive failure including cardiomegaly, pulmonary vascular congestion, interstitial edema, and a small to moderate right pleural effusion.  Findings are similar to perhaps minimally improved si

## 2018-06-22 NOTE — TELEPHONE ENCOUNTER
Please advise - called daughter Maia Wills per hipaa who states patient is still in hospital . Daughter states she needs letter from DR. PORTER that patient is incompetent to manage any financial matter. ( daughter states mother is in hospice has a trust , but dea

## 2018-06-22 NOTE — HOME CARE LIAISON
Patient is current with [address] [place] [address]  Met with patient at the bedside. Patient agreeable to resume home health services.

## 2018-06-22 NOTE — DISCHARGE SUMMARY
[facility]  Discharge Summary     Jacqueline Russell  : 1940    Status: Inpatient  Day #: 2    Attending: Marianna Schaffer MD  PCP: Charmayne Applebaum, MD     Date of Admission: 2018  Date of Discharge: 2018     [facility] Ant Merida kg), SpO2 99 %.   General:  Alert, no distress  HEENT:  Normocephalic, atraumatic  Neck:  Supple, symmetrical  Cardiac:  Regular rate, regular rhythm  Pulmonary:  Clear to auscultation bilaterally, respirations unlabored  Gastrointestinal:  Soft, non-tender TABLETS DAILY   Refills:  0     LIPITOR 20 MG Tabs  Generic drug:  atorvastatin      Take 1 tablet (20 mg total) by mouth nightly.  take 1 tablet (20MG)  by oral route  every day   Quantity:  1 tablet  Refills:  0     methotrexate 2.5 MG Tabs  Commonly know

## 2018-06-23 NOTE — TELEPHONE ENCOUNTER
Telephone call to Andrei. Discussed with her. Pt went home today. Wife is quite ill. At this point pt is still competent to handle his affairs with some help. He is not incompetent to handle his financial affairs.   Pt is to see me in a few weeks for

## 2018-06-25 ENCOUNTER — PATIENT OUTREACH (OUTPATIENT)
Dept: CASE MANAGEMENT | Age: 78
End: 2018-06-25

## 2018-06-25 ENCOUNTER — TELEPHONE (OUTPATIENT)
Dept: CARDIOLOGY UNIT | Facility: HOSPITAL | Age: 78
End: 2018-06-25

## 2018-06-26 NOTE — PROGRESS NOTES
S/w patient, HIPAA verified. He stated that he is laying down and resting right now. He has not weighed himself today yet but states that he rarely fluctuates more than 1 lb. He did not want to get up right now to weigh himself.  He states that he is doing

## 2018-06-27 ENCOUNTER — TELEPHONE (OUTPATIENT)
Dept: INTERNAL MEDICINE CLINIC | Facility: CLINIC | Age: 78
End: 2018-06-27

## 2018-06-27 NOTE — TELEPHONE ENCOUNTER
Vince Goncalves RN called with INR results    PT 32.2, INR 2.7 today    Pt taking coumadin 5mg on Sa, Su, Tu, Th and 2.5mg MWF. Cm Al will call her tomorrow with directions--verbalized understanding    To [facility], please advise.  Pt was recently in hospi

## 2018-06-28 ENCOUNTER — TELEPHONE (OUTPATIENT)
Dept: INTERNAL MEDICINE CLINIC | Facility: CLINIC | Age: 78
End: 2018-06-28

## 2018-06-28 NOTE — TELEPHONE ENCOUNTER
Comfort/ Kelly calling to get recertification for approximately 9 weeks, she will schedule 4 weeks at a time  Requesting a call back with order today.    Tasked to nursing

## 2018-06-29 ENCOUNTER — OFFICE VISIT (OUTPATIENT)
Dept: CARDIOLOGY CLINIC | Facility: HOSPITAL | Age: 78
End: 2018-06-29
Attending: NURSE PRACTITIONER
Payer: MEDICARE

## 2018-06-29 VITALS
SYSTOLIC BLOOD PRESSURE: 137 MMHG | BODY MASS INDEX: 23 KG/M2 | WEIGHT: 162.38 LBS | DIASTOLIC BLOOD PRESSURE: 82 MMHG | HEART RATE: 74 BPM | OXYGEN SATURATION: 99 %

## 2018-06-29 DIAGNOSIS — E87.6 HYPOKALEMIA: ICD-10-CM

## 2018-06-29 DIAGNOSIS — Z95.810 ICD (IMPLANTABLE CARDIOVERTER-DEFIBRILLATOR) IN PLACE: ICD-10-CM

## 2018-06-29 DIAGNOSIS — I50.9 HEART FAILURE, UNSPECIFIED (HCC): ICD-10-CM

## 2018-06-29 DIAGNOSIS — I50.23 ACUTE ON CHRONIC SYSTOLIC CONGESTIVE HEART FAILURE (HCC): Primary | ICD-10-CM

## 2018-06-29 PROCEDURE — 85025 COMPLETE CBC W/AUTO DIFF WBC: CPT | Performed by: NURSE PRACTITIONER

## 2018-06-29 PROCEDURE — 80048 BASIC METABOLIC PNL TOTAL CA: CPT | Performed by: NURSE PRACTITIONER

## 2018-06-29 PROCEDURE — 99211 OFF/OP EST MAY X REQ PHY/QHP: CPT | Performed by: NURSE PRACTITIONER

## 2018-06-29 PROCEDURE — 83880 ASSAY OF NATRIURETIC PEPTIDE: CPT | Performed by: NURSE PRACTITIONER

## 2018-06-29 PROCEDURE — 36415 COLL VENOUS BLD VENIPUNCTURE: CPT | Performed by: NURSE PRACTITIONER

## 2018-06-29 PROCEDURE — 99213 OFFICE O/P EST LOW 20 MIN: CPT | Performed by: NURSE PRACTITIONER

## 2018-06-29 RX ORDER — POTASSIUM CHLORIDE 20 MEQ/1
TABLET, EXTENDED RELEASE ORAL
Status: COMPLETED
Start: 2018-06-29 | End: 2018-06-29

## 2018-06-29 RX ADMIN — POTASSIUM CHLORIDE 40 MEQ: 20 TABLET, EXTENDED RELEASE ORAL at 14:30:00

## 2018-06-29 NOTE — TELEPHONE ENCOUNTER
Noted. Please call Yobani Andrew, from [facility], back and let her know that I approve of her recertification of the patient as requested. I will forward this to nursing.   Thank you!!

## 2018-06-29 NOTE — TELEPHONE ENCOUNTER
Nelsy Irizarry from [facility] and relayed DR. PORTER message - left on VM per hipaa

## 2018-06-29 NOTE — PATIENT INSTRUCTIONS
Continue current medications    Incorporate high potassium foods into your diet.   Flyer provided    Repeat blood work prior to your appointment with Dr. Vivi Nicolas    Please call the heart failure clinic if you notice a weight gain of 3 pounds overnight or walking at a slow to moderate pace for 5-10 minutes 2-3 times a day. Pace your activity to prevent shortness of breath or fatigue.  Stop exercise if you develop chest pain, lightheadedness, or significant shortness of breath

## 2018-06-29 NOTE — PROGRESS NOTES
Specialty Care Clinic    [facility] Patient Status:  No patient class for patient encounter    1940 MRN [id number]   Location [facility] MD Dr. Komal Perez is a [de-identified]year old male Results  Component Value Date/Time   WBC 5.4 06/21/2018 06:38 AM   HGB 11.8 (L) 06/21/2018 06:38 AM   HCT 34.6 (L) 06/21/2018 06:38 AM   PLT 85 (L) 06/21/2018 06:38 AM   CREATSERUM 1.81 (H) 06/22/2018 10:15 AM   BUN 32 (H) 06/22/2018 10:15 AM    06/2 entireapical myocardium. Severe hypokinesis of the mid-apicalanteroseptaland anterior myocardium. Grade 3 DD, Mild-mod MR,     CXR 6/20/18    CONCLUSION:   1.  Mild congestive failure including cardiomegaly, pulmonary vascular congestion, interstitial edema his visit with Dr. Babar Davis on 7/5. If K remains low will start [address]. Follow up in heart failure clinic in 2 weeks. Encouraged daughter to schedule follow up appointment with Dr. Griffin Jameson prior to November.      Plan:  Continue current medications    [facility] for sodium content. · Limit caffeine to no more than 16 ounces per day     · Exercise daily as tolerated, with goal of doing moderate aerobic exercise like walking for about 30 minutes 5 days per week.  Start by walking at a slow to moderate pace for 5-

## 2018-07-03 ENCOUNTER — TELEPHONE (OUTPATIENT)
Dept: INTERNAL MEDICINE CLINIC | Facility: CLINIC | Age: 78
End: 2018-07-03

## 2018-07-03 LAB — INR: 1.7 (ref 0.8–1.2)

## 2018-07-03 NOTE — TELEPHONE ENCOUNTER
Joao Mayorga - CHONG jacques figure out the [facility] template - hopefully you can show me soon on this screen thanks

## 2018-07-03 NOTE — TELEPHONE ENCOUNTER
Take 5mg coumadin tonite, then change to coumadin 5mg M,W,F,Sat and 2.5mg T,Th,Sun.   Repeat INR in 1 week

## 2018-07-03 NOTE — TELEPHONE ENCOUNTER
Wilmer Harsh / [employer] calling with results  PT 20.2, INR 1.7, Coumadin 5 mg M, W & F, 2.5 mg all other days   Tasked to Dr Vicky Vargas on call high

## 2018-07-03 NOTE — TELEPHONE ENCOUNTER
Snow Anderson from [facility] and relayed  S message - left on VM - also LMTCB to know she received this message .

## 2018-07-05 ENCOUNTER — APPOINTMENT (OUTPATIENT)
Dept: LAB | Age: 78
End: 2018-07-05
Attending: NURSE PRACTITIONER
Payer: MEDICARE

## 2018-07-05 ENCOUNTER — OFFICE VISIT (OUTPATIENT)
Dept: PODIATRY CLINIC | Facility: CLINIC | Age: 78
End: 2018-07-05

## 2018-07-05 DIAGNOSIS — I50.9 HEART FAILURE, UNSPECIFIED (HCC): ICD-10-CM

## 2018-07-05 DIAGNOSIS — M79.674 PAIN IN TOES OF BOTH FEET: Primary | ICD-10-CM

## 2018-07-05 DIAGNOSIS — M79.675 PAIN IN TOES OF BOTH FEET: Primary | ICD-10-CM

## 2018-07-05 DIAGNOSIS — B35.1 ONYCHOMYCOSIS: ICD-10-CM

## 2018-07-05 DIAGNOSIS — E87.6 HYPOKALEMIA: ICD-10-CM

## 2018-07-05 LAB
ANION GAP SERPL CALC-SCNC: 8 MMOL/L (ref 0–18)
BUN SERPL-MCNC: 33 MG/DL (ref 8–20)
BUN/CREAT SERPL: 19.3 (ref 10–20)
CALCIUM SERPL-MCNC: 9.1 MG/DL (ref 8.5–10.5)
CHLORIDE SERPL-SCNC: 97 MMOL/L (ref 95–110)
CO2 SERPL-SCNC: 30 MMOL/L (ref 22–32)
CREAT SERPL-MCNC: 1.71 MG/DL (ref 0.5–1.5)
GLUCOSE SERPL-MCNC: 92 MG/DL (ref 70–99)
OSMOLALITY UR CALC.SUM OF ELEC: 287 MOSM/KG (ref 275–295)
POTASSIUM SERPL-SCNC: 3.6 MMOL/L (ref 3.3–5.1)
SODIUM SERPL-SCNC: 135 MMOL/L (ref 136–144)

## 2018-07-05 PROCEDURE — 80048 BASIC METABOLIC PNL TOTAL CA: CPT

## 2018-07-05 PROCEDURE — 36415 COLL VENOUS BLD VENIPUNCTURE: CPT

## 2018-07-05 PROCEDURE — 11721 DEBRIDE NAIL 6 OR MORE: CPT | Performed by: PODIATRIST

## 2018-07-05 NOTE — PROGRESS NOTES
HPI:    Patient ID: Nuria Wright is a [de-identified]year old male. HPI  This 61-year-old male presents with his daughter for care associated with his painful toenails. She reports that he did not complain after previous care.   Review of Systems  I reviewed med PHYSICAL EXAM:   Physical Exam  On physical exam he has palpable discomfort associated with the dystrophic nature of his nails. They have thickness, subungual debris, some keratosis.   Careful and complete debridement of all nails was accomplished today

## 2018-07-10 ENCOUNTER — PRIOR ORIGINAL RECORDS (OUTPATIENT)
Dept: OTHER | Age: 78
End: 2018-07-10

## 2018-07-10 ENCOUNTER — OFFICE VISIT (OUTPATIENT)
Dept: CARDIOLOGY CLINIC | Facility: HOSPITAL | Age: 78
End: 2018-07-10
Attending: INTERNAL MEDICINE
Payer: MEDICARE

## 2018-07-10 ENCOUNTER — TELEPHONE (OUTPATIENT)
Dept: INTERNAL MEDICINE CLINIC | Facility: CLINIC | Age: 78
End: 2018-07-10

## 2018-07-10 VITALS
WEIGHT: 162.31 LBS | HEART RATE: 70 BPM | OXYGEN SATURATION: 97 % | BODY MASS INDEX: 23 KG/M2 | DIASTOLIC BLOOD PRESSURE: 69 MMHG | SYSTOLIC BLOOD PRESSURE: 124 MMHG

## 2018-07-10 DIAGNOSIS — I50.9 HEART FAILURE, UNSPECIFIED (HCC): ICD-10-CM

## 2018-07-10 DIAGNOSIS — Z95.810 ICD (IMPLANTABLE CARDIOVERTER-DEFIBRILLATOR) IN PLACE: ICD-10-CM

## 2018-07-10 DIAGNOSIS — I50.23 ACUTE ON CHRONIC SYSTOLIC (CONGESTIVE) HEART FAILURE (HCC): Primary | ICD-10-CM

## 2018-07-10 LAB
ANION GAP SERPL CALC-SCNC: 10 MMOL/L (ref 0–18)
BUN SERPL-MCNC: 36 MG/DL (ref 8–20)
BUN/CREAT SERPL: 20.8 (ref 10–20)
CALCIUM SERPL-MCNC: 9 MG/DL (ref 8.5–10.5)
CHLORIDE SERPL-SCNC: 96 MMOL/L (ref 95–110)
CO2 SERPL-SCNC: 28 MMOL/L (ref 22–32)
CREAT SERPL-MCNC: 1.73 MG/DL (ref 0.5–1.5)
GLUCOSE SERPL-MCNC: 101 MG/DL (ref 70–99)
INR BLD: 2.8 (ref 0.9–1.2)
OSMOLALITY UR CALC.SUM OF ELEC: 286 MOSM/KG (ref 275–295)
POTASSIUM SERPL-SCNC: 3.8 MMOL/L (ref 3.3–5.1)
PROTHROMBIN TIME: 28.8 SECONDS (ref 11.8–14.5)
SODIUM SERPL-SCNC: 134 MMOL/L (ref 136–144)

## 2018-07-10 PROCEDURE — 99214 OFFICE O/P EST MOD 30 MIN: CPT | Performed by: NURSE PRACTITIONER

## 2018-07-10 PROCEDURE — 36415 COLL VENOUS BLD VENIPUNCTURE: CPT | Performed by: NURSE PRACTITIONER

## 2018-07-10 PROCEDURE — 80048 BASIC METABOLIC PNL TOTAL CA: CPT | Performed by: NURSE PRACTITIONER

## 2018-07-10 PROCEDURE — 85610 PROTHROMBIN TIME: CPT | Performed by: NURSE PRACTITIONER

## 2018-07-10 PROCEDURE — 99211 OFF/OP EST MAY X REQ PHY/QHP: CPT | Performed by: NURSE PRACTITIONER

## 2018-07-10 NOTE — PROGRESS NOTES
Specialty Care Clinic    [facility] Patient Status:  No patient class for patient encounter    1940 MRN [id number]   Location [facility] MD Dr. Fanta Posey is a [de-identified]year old male Results  Component Value Date/Time   WBC 6.1 06/29/2018 01:33 PM   HGB 12.2 (L) 06/29/2018 01:33 PM   HCT 36.2 (L) 06/29/2018 01:33 PM    (L) 06/29/2018 01:33 PM   CREATSERUM 1.71 (H) 07/05/2018 12:06 PM   BUN 33 (H) 07/05/2018 12:06 PM    (L) of the entireapical myocardium. Severe hypokinesis of the mid-apicalanteroseptaland anterior myocardium. Grade 3 DD, Mild-mod MR,     CXR 6/20/18    CONCLUSION:   1.  Mild congestive failure including cardiomegaly, pulmonary vascular congestion, interstitia increased shortness of breath, difficulty breathing when laying down etc. Call the clinic if any of these signs develop at ph: [phone]    Call if having any dizziness, lightheadedness, heart racing, palpitations, chest pain, shortness of breath, cough specifically to heart failure.       Spencer Villaseñor NP  7/10/18

## 2018-07-10 NOTE — TELEPHONE ENCOUNTER
See AC note - called Jacy and will call HHRJHON PALMA to DR. CHARLOTTE Allen pt's wife passed away 7/6/18

## 2018-07-10 NOTE — PATIENT INSTRUCTIONS
Change torsemide to 20 mg alternating with 10 mg every other day.     Please call the heart failure clinic if you notice a weight gain of 3 pounds overnight or 5 pounds or more in 5 days.      Monitor yourself for signs and symptoms of fluid overload, incre shortness of breath or fatigue.  Stop exercise if you develop chest pain, lightheadedness, or significant shortness of breath

## 2018-07-17 ENCOUNTER — TELEPHONE (OUTPATIENT)
Dept: INTERNAL MEDICINE CLINIC | Facility: CLINIC | Age: 78
End: 2018-07-17

## 2018-07-17 LAB — INR: 3.1 (ref 0.8–1.2)

## 2018-07-17 NOTE — TELEPHONE ENCOUNTER
PT 37.0    INR 3.1    On 5 mg M/W/F    2.5 mg all other days    Call back to Camilla @ [facility] Italo Feng  [phone] - ok to leave message

## 2018-07-24 ENCOUNTER — TELEPHONE (OUTPATIENT)
Dept: INTERNAL MEDICINE CLINIC | Facility: CLINIC | Age: 78
End: 2018-07-24

## 2018-07-24 LAB — INR: 1.7 (ref 0.8–1.2)

## 2018-07-24 NOTE — TELEPHONE ENCOUNTER
PT INR results:  PT: 20.5  INR: 1.7  Current dose: did not take any coumadin on 7/17  2.5 mg on Tues, Thurs, Sat, Sun.  5 mg Friday and Monday   Camilla best call back is [phone]. Can leave message via Camilla.

## 2018-07-25 ENCOUNTER — OFFICE VISIT (OUTPATIENT)
Dept: CARDIOLOGY CLINIC | Facility: HOSPITAL | Age: 78
End: 2018-07-25
Attending: INTERNAL MEDICINE
Payer: MEDICARE

## 2018-07-25 VITALS
DIASTOLIC BLOOD PRESSURE: 85 MMHG | BODY MASS INDEX: 24 KG/M2 | WEIGHT: 164 LBS | OXYGEN SATURATION: 97 % | HEART RATE: 80 BPM | SYSTOLIC BLOOD PRESSURE: 148 MMHG

## 2018-07-25 DIAGNOSIS — I50.9 HEART FAILURE, UNSPECIFIED (HCC): ICD-10-CM

## 2018-07-25 DIAGNOSIS — I50.22 CHRONIC SYSTOLIC HEART FAILURE (HCC): Primary | ICD-10-CM

## 2018-07-25 LAB
ANION GAP SERPL CALC-SCNC: 6 MMOL/L (ref 0–18)
BUN SERPL-MCNC: 36 MG/DL (ref 8–20)
BUN/CREAT SERPL: 23.2 (ref 10–20)
CALCIUM SERPL-MCNC: 9.4 MG/DL (ref 8.5–10.5)
CHLORIDE SERPL-SCNC: 101 MMOL/L (ref 95–110)
CO2 SERPL-SCNC: 30 MMOL/L (ref 22–32)
CREAT SERPL-MCNC: 1.55 MG/DL (ref 0.5–1.5)
GLUCOSE SERPL-MCNC: 103 MG/DL (ref 70–99)
OSMOLALITY UR CALC.SUM OF ELEC: 293 MOSM/KG (ref 275–295)
POTASSIUM SERPL-SCNC: 4.3 MMOL/L (ref 3.3–5.1)
SODIUM SERPL-SCNC: 137 MMOL/L (ref 136–144)

## 2018-07-25 PROCEDURE — 36415 COLL VENOUS BLD VENIPUNCTURE: CPT | Performed by: NURSE PRACTITIONER

## 2018-07-25 PROCEDURE — 80048 BASIC METABOLIC PNL TOTAL CA: CPT | Performed by: NURSE PRACTITIONER

## 2018-07-25 PROCEDURE — 99214 OFFICE O/P EST MOD 30 MIN: CPT | Performed by: NURSE PRACTITIONER

## 2018-07-25 PROCEDURE — 99212 OFFICE O/P EST SF 10 MIN: CPT | Performed by: NURSE PRACTITIONER

## 2018-07-25 NOTE — PROGRESS NOTES
Specialty Care Clinic    [facility] Patient Status:  No patient class for patient encounter    1940 MRN [id number]   Location [facility] MD Dr. Fanta Posey is a [de-identified]year old male vomiting  Hematologic/lymphatic: negative  Musculoskeletal: negative for muscle weakness and myalgias    Objective:    Lab Results  Component Value Date/Time   WBC 6.1 06/29/2018 01:33 PM   HGB 12.2 (L) 06/29/2018 01:33 PM   HCT 36.2 (L) 06/29/2018 01:33 P diskinesis of mid-apical myocardium, hypokinesisof the basal myocardium. Grade 1 DD, mild MR, Mod TR  2/28/18 EF 20% Akinesis of the entireapical myocardium. Severe hypokinesis of the mid-apicalanteroseptaland anterior myocardium.  Grade 3 DD, Mild-mod MR, this may be the best place for him. He has been having a difficult time remembering things and repeating questions frequently. During clinic visit could not remember month, year or his anniversary date and became frustrated.      Renal function 1.55. K 4.3 sugars, and  2000 mg sodium restricted diet and maintain fluids at 32 to 52 ounces per day.  Common high sodium foods include frozen dinners, soups (not homemade), some cereal, vegetable juice, canned vegetables, lunch meats, processed meats like hotdogs, s

## 2018-07-25 NOTE — PATIENT INSTRUCTIONS
Continue current medication    Please call the heart failure clinic if you notice a weight gain of 3 pounds overnight or 5 pounds or more in 5 days.      Monitor yourself for signs and symptoms of fluid overload, increased shortness of breath, difficulty b your activity to prevent shortness of breath or fatigue.  Stop exercise if you develop chest pain, lightheadedness, or significant shortness of breath

## 2018-07-30 ENCOUNTER — OFFICE VISIT (OUTPATIENT)
Dept: INTERNAL MEDICINE CLINIC | Facility: CLINIC | Age: 78
End: 2018-07-30
Payer: MEDICARE

## 2018-07-30 VITALS
SYSTOLIC BLOOD PRESSURE: 130 MMHG | HEART RATE: 80 BPM | TEMPERATURE: 98 F | HEIGHT: 68 IN | WEIGHT: 168 LBS | DIASTOLIC BLOOD PRESSURE: 70 MMHG | BODY MASS INDEX: 25.46 KG/M2

## 2018-07-30 DIAGNOSIS — I50.23 ACUTE ON CHRONIC SYSTOLIC CONGESTIVE HEART FAILURE (HCC): Primary | ICD-10-CM

## 2018-07-30 DIAGNOSIS — R53.83 FATIGUE, UNSPECIFIED TYPE: ICD-10-CM

## 2018-07-30 DIAGNOSIS — I42.9 CARDIOMYOPATHY, UNSPECIFIED TYPE (HCC): ICD-10-CM

## 2018-07-30 DIAGNOSIS — E78.00 HYPERCHOLESTEREMIA: ICD-10-CM

## 2018-07-30 DIAGNOSIS — D63.1 ANEMIA IN STAGE 3 CHRONIC KIDNEY DISEASE (HCC): ICD-10-CM

## 2018-07-30 DIAGNOSIS — N18.30 CKD (CHRONIC KIDNEY DISEASE), STAGE III (HCC): ICD-10-CM

## 2018-07-30 DIAGNOSIS — M05.79 SEROPOSITIVE RHEUMATOID ARTHRITIS OF MULTIPLE SITES (HCC): ICD-10-CM

## 2018-07-30 DIAGNOSIS — D69.6 THROMBOCYTOPENIA (HCC): ICD-10-CM

## 2018-07-30 DIAGNOSIS — N18.30 ANEMIA IN STAGE 3 CHRONIC KIDNEY DISEASE (HCC): ICD-10-CM

## 2018-07-30 DIAGNOSIS — Z95.810 ICD (IMPLANTABLE CARDIOVERTER-DEFIBRILLATOR) IN PLACE: ICD-10-CM

## 2018-07-30 DIAGNOSIS — J43.9 PULMONARY EMPHYSEMA, UNSPECIFIED EMPHYSEMA TYPE (HCC): ICD-10-CM

## 2018-07-30 DIAGNOSIS — I10 ESSENTIAL HYPERTENSION: ICD-10-CM

## 2018-07-30 DIAGNOSIS — Z79.01 ANTICOAGULATED ON COUMADIN: ICD-10-CM

## 2018-07-30 PROBLEM — J44.1 ACUTE EXACERBATION OF CHRONIC OBSTRUCTIVE PULMONARY DISEASE (COPD) (HCC): Status: RESOLVED | Noted: 2018-06-20 | Resolved: 2018-07-30

## 2018-07-30 PROBLEM — I50.9 CONGESTIVE HEART FAILURE, UNSPECIFIED HF CHRONICITY, UNSPECIFIED HEART FAILURE TYPE (HCC): Status: RESOLVED | Noted: 2018-06-20 | Resolved: 2018-07-30

## 2018-07-30 PROBLEM — R06.03 ACUTE RESPIRATORY DISTRESS: Status: RESOLVED | Noted: 2018-06-20 | Resolved: 2018-07-30

## 2018-07-30 PROCEDURE — G0463 HOSPITAL OUTPT CLINIC VISIT: HCPCS | Performed by: INTERNAL MEDICINE

## 2018-07-30 PROCEDURE — 99214 OFFICE O/P EST MOD 30 MIN: CPT | Performed by: INTERNAL MEDICINE

## 2018-07-30 NOTE — PROGRESS NOTES
Nuria Wright is a [de-identified]year old male. Patient presents with:  Checkup  Fatigue  Congestive Heart Failure (cardiovascular)  Hypertension  Hyperlipidemia    HPI:   Patient presents with:  Checkup  Fatigue  Congestive Heart Failure (cardiovascular)  Hypertens take 1 tablet (20MG)  by oral route  every day Disp: 1 tablet Rfl: 0   carvedilol 25 MG Oral Tab Take 1 tablet (25 mg total) by mouth 2 (two) times daily with meals.  take 1 tablet (25MG)  by oral route 2 times every day with food Disp: 1 tablet Rfl: 0   sp GI:Protuberant, BS are present, no organomegaly or palpable masses  EXTREMITIES: no edema  NEURO: alert and oriented. Patient does have some difficulty with memory. ASSESSMENT AND PLAN:   1.  Acute on chronic systolic congestive heart failure ([address]  Sta

## 2018-07-30 NOTE — PATIENT INSTRUCTIONS
1.  Patient is to continue his current diet, medication and activity. 2.  I will refer the patient see either Dr. Juan C Garcia or Dr. Pedro Pablo Lew for neurological evaluation. 3.  I will refer the patient see Dr. Martha Garcia for a hearing evaluation.   4.  I will plan

## 2018-07-31 ENCOUNTER — PRIOR ORIGINAL RECORDS (OUTPATIENT)
Dept: OTHER | Age: 78
End: 2018-07-31

## 2018-07-31 ENCOUNTER — MYAURORA ACCOUNT LINK (OUTPATIENT)
Dept: OTHER | Age: 78
End: 2018-07-31

## 2018-07-31 ENCOUNTER — TELEPHONE (OUTPATIENT)
Dept: INTERNAL MEDICINE CLINIC | Facility: CLINIC | Age: 78
End: 2018-07-31

## 2018-07-31 LAB — INR: 1.6 (ref 0.8–1.2)

## 2018-07-31 NOTE — TELEPHONE ENCOUNTER
To nursing,   please tell home health I received message that today's INR is 1.6.  (Today is Tuesday)  1 week ago, INR was 1.7. New instructions– take Coumadin 5 mg Tuesdays Thursdays and Sundays. 2.5 mg the other days of the week. Next INR in 6 days.

## 2018-07-31 NOTE — TELEPHONE ENCOUNTER
Dilan Beltran @ [facility] calling with PT INR results:  PT - 18.6  INR - 1.6  On the 24th ot took 5mg  5mg on Mondays and Fridays  2.5mg the rest of the days. Best call back is [phone].   Tasked to Dr Too Crystal.

## 2018-08-01 NOTE — TELEPHONE ENCOUNTER
Noted.  I concur with Dr Payne Comes recommendation. I will forward this to Drew Puckett as an [phone] [address] also.   Thank you!!

## 2018-08-06 ENCOUNTER — TELEPHONE (OUTPATIENT)
Dept: INTERNAL MEDICINE CLINIC | Facility: CLINIC | Age: 78
End: 2018-08-06

## 2018-08-06 NOTE — TELEPHONE ENCOUNTER
To  on call --spoke to Clayton Garcia dtr, and she states he is slightly SOB and has been taking the Torsemide;  pls review if you want to have 3 days of an extra Torsemide dose and then I will call Clayton Garcia and [facility] back with instructions for diuretic and warfarin

## 2018-08-06 NOTE — TELEPHONE ENCOUNTER
Abdirahman Bravo @ [facility] is calling to leave PT INR results:  PT - 16.6  INR - 1.4     also states that pt has gained 5lbs since 7/4 and just a touch more short of breath than before. Best call for Abdirahman Bravo is [phone]. Tasked to nursing.

## 2018-08-06 NOTE — TELEPHONE ENCOUNTER
Spoke to STAFFANSTORP - - gave her the instructions from DR. CULLEN below to give additional 20 mg Torsemide at NN daily for 3 days; Also to put a call into HF NP and see if he needs to be seen ;    STAFFANSTORP will verify with sister if it is 20 mg daily or 20 alt 10 mg daily

## 2018-08-06 NOTE — TELEPHONE ENCOUNTER
Per Maureen Whitmore NP note from July 23, 2018. Patient is on torsemide 20 mg 1 day alternating with 10 mg every other day.   Please ask if patient has had any weight gain he has instructions were to call the heart failure center if he gained 3 pounds overnig

## 2018-08-07 RX ORDER — FLUOXETINE 10 MG/1
CAPSULE ORAL
Qty: 90 CAPSULE | Refills: 3 | Status: SHIPPED | OUTPATIENT
Start: 2018-08-07 | End: 2018-12-05 | Stop reason: DRUGHIGH

## 2018-08-14 ENCOUNTER — TELEPHONE (OUTPATIENT)
Dept: INTERNAL MEDICINE CLINIC | Facility: CLINIC | Age: 78
End: 2018-08-14

## 2018-08-14 LAB — INR: 1.7 (ref 0.8–1.2)

## 2018-08-16 NOTE — TELEPHONE ENCOUNTER
Charanjit Yap / Kelly calling to get the dose of coumadin, [phone]       Tasked to coumadin high

## 2018-08-17 NOTE — TELEPHONE ENCOUNTER
Tabitha Jj is calling back to get pt.s coumadin dosage ph.  # [phone]   Routed high to anti rosalee

## 2018-08-17 NOTE — TELEPHONE ENCOUNTER
Pleas call Brooklyn Bryant -  eis confused about the message and flowsheet states 7.5 mg x1 and increase ? - to Phyllis Ortega

## 2018-08-17 NOTE — TELEPHONE ENCOUNTER
Spoke to Comfort---I missed INR on Tues. Which is where the confusion came in;    We revamped pt instructions and recheck 9 days from now

## 2018-08-27 NOTE — TELEPHONE ENCOUNTER
Sebas Castano called with PTINR results:  PT - 25.2  INR - 2.1  Comadin 7.5MG on 8/17 and then 5MG daily.    Call back for Sebas Castano is [phone]

## 2018-08-28 LAB — INR: 2.1 (ref 0.8–1.2)

## 2018-08-28 NOTE — TELEPHONE ENCOUNTER
See [facility] note  Justa Freitas RN notes [facility];   I krish coordinate with Michi Owusu dtr follow up INRs

## 2018-09-07 ENCOUNTER — ANTI-COAG VISIT (OUTPATIENT)
Dept: INTERNAL MEDICINE CLINIC | Facility: CLINIC | Age: 78
End: 2018-09-07
Payer: MEDICARE

## 2018-09-07 DIAGNOSIS — Z79.01 ANTICOAGULATED ON COUMADIN: Primary | ICD-10-CM

## 2018-09-07 LAB
INR: 4.5 (ref 0.8–1.2)
TEST STRIP EXPIRATION DATE: ABNORMAL DATE

## 2018-09-07 PROCEDURE — G0463 HOSPITAL OUTPT CLINIC VISIT: HCPCS

## 2018-09-07 PROCEDURE — 36416 COLLJ CAPILLARY BLOOD SPEC: CPT

## 2018-09-07 PROCEDURE — 85610 PROTHROMBIN TIME: CPT

## 2018-09-13 ENCOUNTER — ANTI-COAG VISIT (OUTPATIENT)
Dept: INTERNAL MEDICINE CLINIC | Facility: CLINIC | Age: 78
End: 2018-09-13
Payer: MEDICARE

## 2018-09-13 DIAGNOSIS — Z79.01 ANTICOAGULATED ON COUMADIN: ICD-10-CM

## 2018-09-13 LAB
INR: 1.6 (ref 0.8–1.2)
TEST STRIP EXPIRATION DATE: ABNORMAL DATE

## 2018-09-13 PROCEDURE — G0463 HOSPITAL OUTPT CLINIC VISIT: HCPCS

## 2018-09-13 PROCEDURE — 85610 PROTHROMBIN TIME: CPT

## 2018-09-13 PROCEDURE — 36416 COLLJ CAPILLARY BLOOD SPEC: CPT

## 2018-09-17 ENCOUNTER — PATIENT MESSAGE (OUTPATIENT)
Dept: INTERNAL MEDICINE CLINIC | Facility: CLINIC | Age: 78
End: 2018-09-17

## 2018-09-17 RX ORDER — FINASTERIDE 5 MG/1
TABLET, FILM COATED ORAL
Qty: 90 TABLET | Refills: 0 | Status: SHIPPED | OUTPATIENT
Start: 2018-09-17

## 2018-09-20 ENCOUNTER — ANTI-COAG VISIT (OUTPATIENT)
Dept: INTERNAL MEDICINE CLINIC | Facility: CLINIC | Age: 78
End: 2018-09-20
Payer: MEDICARE

## 2018-09-20 DIAGNOSIS — Z79.01 ANTICOAGULATED ON COUMADIN: ICD-10-CM

## 2018-09-20 LAB
INR: 3 (ref 0.8–1.2)
TEST STRIP EXPIRATION DATE: ABNORMAL DATE

## 2018-09-20 PROCEDURE — G0463 HOSPITAL OUTPT CLINIC VISIT: HCPCS

## 2018-09-20 PROCEDURE — 36416 COLLJ CAPILLARY BLOOD SPEC: CPT

## 2018-09-20 PROCEDURE — 85610 PROTHROMBIN TIME: CPT

## 2018-10-04 ENCOUNTER — ANTI-COAG VISIT (OUTPATIENT)
Dept: INTERNAL MEDICINE CLINIC | Facility: CLINIC | Age: 78
End: 2018-10-04
Payer: MEDICARE

## 2018-10-04 DIAGNOSIS — Z79.01 ANTICOAGULATED ON COUMADIN: ICD-10-CM

## 2018-10-04 PROCEDURE — G0463 HOSPITAL OUTPT CLINIC VISIT: HCPCS

## 2018-10-04 PROCEDURE — 36416 COLLJ CAPILLARY BLOOD SPEC: CPT

## 2018-10-04 PROCEDURE — 90653 IIV ADJUVANT VACCINE IM: CPT | Performed by: INTERNAL MEDICINE

## 2018-10-04 PROCEDURE — 85610 PROTHROMBIN TIME: CPT

## 2018-10-04 PROCEDURE — G0008 ADMIN INFLUENZA VIRUS VAC: HCPCS | Performed by: INTERNAL MEDICINE

## 2018-10-16 ENCOUNTER — TELEPHONE (OUTPATIENT)
Dept: INTERNAL MEDICINE CLINIC | Facility: CLINIC | Age: 78
End: 2018-10-16

## 2018-10-16 RX ORDER — TORSEMIDE 20 MG/1
20 TABLET ORAL DAILY
Qty: 30 TABLET | Refills: 2 | Status: CANCELLED | OUTPATIENT
Start: 2018-10-16

## 2018-10-17 NOTE — TELEPHONE ENCOUNTER
To Dr. Beverly Hamilton, please advise as torsemide has not been filled by our office before    Last prescribed 6/22/18 by hospitalist: toresemide 20mg daily #30 with 2 RF    [facility] states pt has been out for 2 days.  Pt is alternating 20mg every other day with 10mg

## 2018-10-17 NOTE — TELEPHONE ENCOUNTER
Ade Dunn is calling to get Rx for Torsemide filled. She states Dr. Silvina Riggins is a hospitalist this medication was prescribed to him while he was in the hospital ph.  # [phone]   Routed to Rx

## 2018-10-18 ENCOUNTER — TELEPHONE (OUTPATIENT)
Dept: INTERNAL MEDICINE CLINIC | Facility: CLINIC | Age: 78
End: 2018-10-18

## 2018-10-18 RX ORDER — TORSEMIDE 20 MG/1
TABLET ORAL
Qty: 30 TABLET | Refills: 0 | Status: SHIPPED | OUTPATIENT
Start: 2018-10-18 | End: 2020-04-22

## 2018-10-18 NOTE — TELEPHONE ENCOUNTER
Spoke to Michi Owusu, dtr - -- pt has been on Torsemide since June 2018 from the HF clinic (alt 10/20 mg daily); He is not SOB currently; He does not see the cardiologist until December;   We agreed one refill would be sent in until  can evaluate

## 2018-10-19 NOTE — TELEPHONE ENCOUNTER
A phone call to patient's daughter, Maria Teresa Wyatt, and message left.   I told the patient's daughter that in the future if the patient needs a refill I will refill the prescriptions medication with a number refills to hold him until he can see the cardiologist or m

## 2018-10-19 NOTE — TELEPHONE ENCOUNTER
Noted.  I have called the patient's daughter and left a message. I have left the message that I will refill the medication for the patient in the future when she needs it.   She needs a refill in a month she is to call back and I will arrange to give her t

## 2018-10-25 ENCOUNTER — ANTI-COAG VISIT (OUTPATIENT)
Dept: INTERNAL MEDICINE CLINIC | Facility: CLINIC | Age: 78
End: 2018-10-25
Payer: MEDICARE

## 2018-10-25 ENCOUNTER — APPOINTMENT (OUTPATIENT)
Dept: LAB | Age: 78
End: 2018-10-25
Attending: INTERNAL MEDICINE
Payer: MEDICARE

## 2018-10-25 DIAGNOSIS — Z79.01 ANTICOAGULATED ON COUMADIN: ICD-10-CM

## 2018-10-25 PROCEDURE — 85610 PROTHROMBIN TIME: CPT

## 2018-10-25 PROCEDURE — 99211 OFF/OP EST MAY X REQ PHY/QHP: CPT

## 2018-10-25 PROCEDURE — 36415 COLL VENOUS BLD VENIPUNCTURE: CPT

## 2018-10-29 ENCOUNTER — ANTI-COAG VISIT (OUTPATIENT)
Dept: INTERNAL MEDICINE CLINIC | Facility: CLINIC | Age: 78
End: 2018-10-29
Payer: MEDICARE

## 2018-10-29 DIAGNOSIS — Z79.01 ANTICOAGULATED ON COUMADIN: ICD-10-CM

## 2018-10-29 PROCEDURE — 36416 COLLJ CAPILLARY BLOOD SPEC: CPT

## 2018-10-29 PROCEDURE — G0463 HOSPITAL OUTPT CLINIC VISIT: HCPCS

## 2018-10-29 PROCEDURE — 85610 PROTHROMBIN TIME: CPT

## 2018-11-05 ENCOUNTER — TELEPHONE (OUTPATIENT)
Dept: INTERNAL MEDICINE CLINIC | Facility: CLINIC | Age: 78
End: 2018-11-05

## 2018-11-05 DIAGNOSIS — Z51.81 MONITORING FOR ANTICOAGULANT USE: Primary | ICD-10-CM

## 2018-11-05 DIAGNOSIS — Z79.01 ANTICOAGULATED ON COUMADIN: ICD-10-CM

## 2018-11-05 DIAGNOSIS — Z79.01 MONITORING FOR ANTICOAGULANT USE: Primary | ICD-10-CM

## 2018-11-07 ENCOUNTER — APPOINTMENT (OUTPATIENT)
Dept: LAB | Age: 78
End: 2018-11-07
Attending: INTERNAL MEDICINE
Payer: MEDICARE

## 2018-11-07 DIAGNOSIS — Z79.01 MONITORING FOR ANTICOAGULANT USE: ICD-10-CM

## 2018-11-07 DIAGNOSIS — Z51.81 MONITORING FOR ANTICOAGULANT USE: ICD-10-CM

## 2018-11-07 PROCEDURE — 85610 PROTHROMBIN TIME: CPT

## 2018-11-07 PROCEDURE — 36415 COLL VENOUS BLD VENIPUNCTURE: CPT

## 2018-11-12 LAB
BUN: 36 MG/DL
CALCIUM: 9 MG/DL
CHLORIDE: 96 MEQ/L
CREATININE, SERUM: 1.73 MG/DL
GLUCOSE: 101 MG/DL
POTASSIUM, SERUM: 3.8 MEQ/L
SODIUM: 134 MEQ/L

## 2018-11-16 ENCOUNTER — ANTI-COAG VISIT (OUTPATIENT)
Dept: INTERNAL MEDICINE CLINIC | Facility: CLINIC | Age: 78
End: 2018-11-16
Payer: MEDICARE

## 2018-11-16 ENCOUNTER — PRIOR ORIGINAL RECORDS (OUTPATIENT)
Dept: OTHER | Age: 78
End: 2018-11-16

## 2018-11-16 DIAGNOSIS — Z79.01 ANTICOAGULATED ON COUMADIN: ICD-10-CM

## 2018-11-16 PROCEDURE — 36416 COLLJ CAPILLARY BLOOD SPEC: CPT

## 2018-11-16 PROCEDURE — 85610 PROTHROMBIN TIME: CPT

## 2018-11-26 RX ORDER — FOLIC ACID 1 MG/1
TABLET ORAL
Qty: 90 TABLET | Refills: 3 | Status: SHIPPED | OUTPATIENT
Start: 2018-11-26 | End: 2020-01-06

## 2018-11-26 NOTE — TELEPHONE ENCOUNTER
LOV:4-4  Last Filled:11-3-17, #90 with 3 refills  Labs:   Future Appointments   Date Time Provider Yobani Trevino   12/3/2018  1:30 PM Tameka Sequeira MD [facility] HEM ONC EMO   12/5/2018 11:30 AM Manny Reid MD [facility] EMA [place]       Please Advis

## 2018-11-27 ENCOUNTER — PATIENT MESSAGE (OUTPATIENT)
Dept: INTERNAL MEDICINE CLINIC | Facility: CLINIC | Age: 78
End: 2018-11-27

## 2018-11-27 NOTE — TELEPHONE ENCOUNTER
Spironolactone has not previously been ordered by Dr. Pepe Contreras. Who is the original ordering doctor? Is patient able to request RF from this doctor? If not, need to confirm dose.      LMTCB for daughter, Shana Martinez.

## 2018-11-27 NOTE — TELEPHONE ENCOUNTER
From: Kavitha Bryan  To: Vidhi Gutierrez MD  Sent: 11/27/2018 11:50 AM CST  Subject: Prescription Question    Hi. I need to have my dads spironolactone prescription refilled. Can you help with that? Thanks.   Alanis Moran

## 2018-11-27 NOTE — TELEPHONE ENCOUNTER
Spoke with daughter, Maria Teresa Perez, per HIPAA. Informed her that Dr. Yomi Ng is not the original ordering doctor. She verbalized understanding and states it may have come from cardiologist. She will f/u with cardiology.  Also advised that she can contact pharmacy, as they

## 2018-11-30 ENCOUNTER — LAB ENCOUNTER (OUTPATIENT)
Dept: LAB | Age: 78
End: 2018-11-30
Attending: INTERNAL MEDICINE
Payer: MEDICARE

## 2018-11-30 DIAGNOSIS — D69.6 THROMBOCYTOPENIA (HCC): ICD-10-CM

## 2018-11-30 DIAGNOSIS — N18.30 ANEMIA IN STAGE 3 CHRONIC KIDNEY DISEASE (HCC): ICD-10-CM

## 2018-11-30 DIAGNOSIS — Z79.01 ANTICOAGULATED ON COUMADIN: ICD-10-CM

## 2018-11-30 DIAGNOSIS — D63.1 ANEMIA IN STAGE 3 CHRONIC KIDNEY DISEASE (HCC): ICD-10-CM

## 2018-11-30 PROCEDURE — 85025 COMPLETE CBC W/AUTO DIFF WBC: CPT

## 2018-11-30 PROCEDURE — 36415 COLL VENOUS BLD VENIPUNCTURE: CPT

## 2018-12-03 ENCOUNTER — OFFICE VISIT (OUTPATIENT)
Dept: HEMATOLOGY/ONCOLOGY | Facility: HOSPITAL | Age: 78
End: 2018-12-03
Attending: INTERNAL MEDICINE
Payer: MEDICARE

## 2018-12-03 VITALS
DIASTOLIC BLOOD PRESSURE: 83 MMHG | SYSTOLIC BLOOD PRESSURE: 137 MMHG | HEIGHT: 68 IN | TEMPERATURE: 98 F | WEIGHT: 162 LBS | RESPIRATION RATE: 18 BRPM | BODY MASS INDEX: 24.55 KG/M2 | HEART RATE: 83 BPM

## 2018-12-03 DIAGNOSIS — D63.1 ANEMIA IN STAGE 3 CHRONIC KIDNEY DISEASE (HCC): Primary | ICD-10-CM

## 2018-12-03 DIAGNOSIS — N18.30 ANEMIA IN STAGE 3 CHRONIC KIDNEY DISEASE (HCC): Primary | ICD-10-CM

## 2018-12-03 DIAGNOSIS — D69.6 THROMBOCYTOPENIA (HCC): ICD-10-CM

## 2018-12-03 PROCEDURE — 99213 OFFICE O/P EST LOW 20 MIN: CPT | Performed by: INTERNAL MEDICINE

## 2018-12-03 NOTE — PROGRESS NOTES
PARMINDER Lynch is a [de-identified]year old male here for f/u of Anemia in stage 3 chronic kidney disease (hcc)  (primary encounter diagnosis)  Thrombocytopenia (hcc)     Pt here for follow up. His wife recently passed away.     Now lives alone, but fa nightly. take 1 tablet (20MG)  by oral route  every day Disp: 1 tablet Rfl: 0   carvedilol 25 MG Oral Tab Take 1 tablet (25 mg total) by mouth 2 (two) times daily with meals.  take 1 tablet (25MG)  by oral route 2 times every day with food Disp: 1 tablet Rf History      Occupation: [employer]    Tobacco Use      Smoking status: Former Smoker        Packs/day: 1.50        Years: 30.00        Pack years: 45        Types: Cigarettes      Smokeless tobacco: Never Used      Tobacco comment: quit smoking about 12 yrs ag exudate. Eyes: No scleral icterus. Cardiovascular: Normal rate and regular rhythm. No murmur heard. Pulmonary/Chest: Effort normal and breath sounds normal. No respiratory distress. Abdominal: Soft.  Bowel sounds are normal. He exhibits no distensi (L)   Hemoglobin      13.5 - 17.5 g/dL 12.5 (L) 12.2 (L) 11.8 (L) 11.8 (L)   Hematocrit      41.0 - 52.0 % 35.9 (L) 36.2 (L) 34.6 (L) 35.0 (L)   MCV      80.0 - 100.0 fL 98.5 93.3 93.5 94.3   MCH      27.0 - 32.0 pg 34.2 (H) 31.3 31.8 31.9   MCHC      32. 0

## 2018-12-04 ENCOUNTER — MYAURORA ACCOUNT LINK (OUTPATIENT)
Dept: OTHER | Age: 78
End: 2018-12-04

## 2018-12-04 ENCOUNTER — PRIOR ORIGINAL RECORDS (OUTPATIENT)
Dept: OTHER | Age: 78
End: 2018-12-04

## 2018-12-05 ENCOUNTER — OFFICE VISIT (OUTPATIENT)
Dept: INTERNAL MEDICINE CLINIC | Facility: CLINIC | Age: 78
End: 2018-12-05
Payer: MEDICARE

## 2018-12-05 VITALS
WEIGHT: 162 LBS | SYSTOLIC BLOOD PRESSURE: 100 MMHG | OXYGEN SATURATION: 95 % | HEART RATE: 72 BPM | BODY MASS INDEX: 25 KG/M2 | TEMPERATURE: 98 F | DIASTOLIC BLOOD PRESSURE: 60 MMHG

## 2018-12-05 DIAGNOSIS — D69.6 THROMBOCYTOPENIA (HCC): ICD-10-CM

## 2018-12-05 DIAGNOSIS — I42.9 CARDIOMYOPATHY, UNSPECIFIED TYPE (HCC): ICD-10-CM

## 2018-12-05 DIAGNOSIS — R53.83 FATIGUE, UNSPECIFIED TYPE: ICD-10-CM

## 2018-12-05 DIAGNOSIS — Z79.01 ANTICOAGULATED ON COUMADIN: ICD-10-CM

## 2018-12-05 DIAGNOSIS — M05.79 SEROPOSITIVE RHEUMATOID ARTHRITIS OF MULTIPLE SITES (HCC): ICD-10-CM

## 2018-12-05 DIAGNOSIS — I10 ESSENTIAL HYPERTENSION: ICD-10-CM

## 2018-12-05 DIAGNOSIS — N18.30 ANEMIA IN STAGE 3 CHRONIC KIDNEY DISEASE (HCC): ICD-10-CM

## 2018-12-05 DIAGNOSIS — D63.1 ANEMIA IN STAGE 3 CHRONIC KIDNEY DISEASE (HCC): ICD-10-CM

## 2018-12-05 DIAGNOSIS — Z95.810 ICD (IMPLANTABLE CARDIOVERTER-DEFIBRILLATOR) IN PLACE: ICD-10-CM

## 2018-12-05 DIAGNOSIS — N18.30 CKD (CHRONIC KIDNEY DISEASE), STAGE III (HCC): ICD-10-CM

## 2018-12-05 DIAGNOSIS — J43.9 PULMONARY EMPHYSEMA, UNSPECIFIED EMPHYSEMA TYPE (HCC): ICD-10-CM

## 2018-12-05 DIAGNOSIS — E78.00 HYPERCHOLESTEREMIA: ICD-10-CM

## 2018-12-05 DIAGNOSIS — I50.23 ACUTE ON CHRONIC SYSTOLIC CONGESTIVE HEART FAILURE (HCC): Primary | ICD-10-CM

## 2018-12-05 PROCEDURE — 99214 OFFICE O/P EST MOD 30 MIN: CPT | Performed by: INTERNAL MEDICINE

## 2018-12-05 PROCEDURE — G0463 HOSPITAL OUTPT CLINIC VISIT: HCPCS | Performed by: INTERNAL MEDICINE

## 2018-12-05 RX ORDER — FLUOXETINE HYDROCHLORIDE 20 MG/1
20 CAPSULE ORAL DAILY
Qty: 90 CAPSULE | Refills: 3 | Status: SHIPPED | OUTPATIENT
Start: 2018-12-05 | End: 2021-01-01 | Stop reason: DRUGHIGH

## 2018-12-05 NOTE — PATIENT INSTRUCTIONS
1.  Patient is to continue his current diet, medication and activity. 2.  I will increase the patient's Prozac to 20 mg orally daily. 3.  I will see the patient back in 3 months with blood tests which will include a CBC, CMP, lipid panel, and TSH.   4.  I

## 2018-12-05 NOTE — PROGRESS NOTES
Frederick Cabrera is a [de-identified]year old male. Patient presents with:  Checkup: 6 mo f/u  Fatigue  Congestive Heart Failure (cardiovascular)  Hypertension  Hyperlipidemia    HPI:   Patient presents with:  Checkup: 6 mo f/u  Fatigue  Congestive Heart Failure (cardio Mon, Wed, Fri Disp: 90 tablet Rfl: 1   HYDRALAZINE HCL 25 MG Oral Tab TAKE 1 TABLET (25 MG TOTAL) BY MOUTH 2 (TWO) TIMES DAILY. Disp: 180 tablet Rfl: 3   atorvastatin (LIPITOR) 20 MG Oral Tab Take 1 tablet (20 mg total) by mouth nightly.  take 1 tablet (20M normal TM's. Ears are normal. Eyes are normal  NECK: supple,no lymphadenopathy or masses, no bruits  CHEST: Well-developed male.   LUNGS: clear to auscultation  CARDIO: RRR, normal S1S2, without murmur   GI:Protuberant, BS are present, no organomegaly or pa doing well at this time. I will increase the patient's Prozac to 20 mg orally daily to see if this helps the patient. 12. Pulmonary emphysema, unspecified emphysema type ([address]  Stable.   CPM.      The patient indicates understanding of these issues and

## 2018-12-11 ENCOUNTER — PATIENT MESSAGE (OUTPATIENT)
Dept: INTERNAL MEDICINE CLINIC | Facility: CLINIC | Age: 78
End: 2018-12-11

## 2018-12-18 ENCOUNTER — ANTI-COAG VISIT (OUTPATIENT)
Dept: INTERNAL MEDICINE CLINIC | Facility: CLINIC | Age: 78
End: 2018-12-18
Payer: MEDICARE

## 2018-12-18 DIAGNOSIS — Z79.01 ANTICOAGULATED ON COUMADIN: ICD-10-CM

## 2018-12-18 PROCEDURE — 85610 PROTHROMBIN TIME: CPT

## 2018-12-18 PROCEDURE — 36416 COLLJ CAPILLARY BLOOD SPEC: CPT

## 2019-01-03 ENCOUNTER — OFFICE VISIT (OUTPATIENT)
Dept: PODIATRY CLINIC | Facility: CLINIC | Age: 79
End: 2019-01-03
Payer: MEDICARE

## 2019-01-03 DIAGNOSIS — M79.674 PAIN IN TOES OF BOTH FEET: Primary | ICD-10-CM

## 2019-01-03 DIAGNOSIS — B35.1 ONYCHOMYCOSIS: ICD-10-CM

## 2019-01-03 DIAGNOSIS — M79.675 PAIN IN TOES OF BOTH FEET: Primary | ICD-10-CM

## 2019-01-03 PROCEDURE — 11721 DEBRIDE NAIL 6 OR MORE: CPT | Performed by: PODIATRIST

## 2019-01-03 NOTE — PROGRESS NOTES
HPI:    Patient ID: Geeta Allen is a [de-identified]year old male. This 64-year-old male presents for care associated with his painful toenails. He reports relief by previous treatment.   He is accompanied today by his daughter I did review medical status, medica reaction(s): angioedema   PHYSICAL EXAM:     Physical exam patient has palpable discomfort due to the dystrophic nature of all nails except the second left. He lost the second left toenail a couple of weeks ago and there was some bleeding.   He no longer h

## 2019-01-07 ENCOUNTER — TELEPHONE (OUTPATIENT)
Dept: INTERNAL MEDICINE CLINIC | Facility: CLINIC | Age: 79
End: 2019-01-07

## 2019-01-07 NOTE — TELEPHONE ENCOUNTER
Pt's daughter, Melody Diaz, is calling to see if the pt should have an xray or some medication sent to the pharmacy to help with sinus congestion. Symptoms: bad cough, coughing up fleam, doesn't sound good. Pt has had this for about a week or so.    Melody Diaz

## 2019-01-07 NOTE — TELEPHONE ENCOUNTER
Sue Barber called back to say she is going to hold off on addressing this issue. She feels her dad seems a bit better.

## 2019-01-07 NOTE — TELEPHONE ENCOUNTER
To Dr. Buzz Samano - see below - per Cherelle Moreno: no fever/chills that she of which she is aware. Cough is somewhat loose. Pt sounds good.   She will keep close eye on him for a few days and will

## 2019-01-15 ENCOUNTER — TELEPHONE (OUTPATIENT)
Dept: INTERNAL MEDICINE CLINIC | Facility: CLINIC | Age: 79
End: 2019-01-15

## 2019-01-15 ENCOUNTER — ANTI-COAG VISIT (OUTPATIENT)
Dept: INTERNAL MEDICINE CLINIC | Facility: CLINIC | Age: 79
End: 2019-01-15
Payer: MEDICARE

## 2019-01-15 ENCOUNTER — HOSPITAL ENCOUNTER (OUTPATIENT)
Age: 79
Discharge: ANOTHER HOSPITAL | End: 2019-01-15
Attending: EMERGENCY MEDICINE | Admitting: INTERNAL MEDICINE
Payer: MEDICARE

## 2019-01-15 ENCOUNTER — HOSPITAL ENCOUNTER (EMERGENCY)
Facility: HOSPITAL | Age: 79
Discharge: STILL ADMITTED OR ADMINISTRATIVE | End: 2019-01-16
Payer: MEDICARE

## 2019-01-15 ENCOUNTER — APPOINTMENT (OUTPATIENT)
Dept: GENERAL RADIOLOGY | Age: 79
End: 2019-01-15
Attending: EMERGENCY MEDICINE
Payer: MEDICARE

## 2019-01-15 VITALS
TEMPERATURE: 97 F | SYSTOLIC BLOOD PRESSURE: 147 MMHG | OXYGEN SATURATION: 96 % | DIASTOLIC BLOOD PRESSURE: 85 MMHG | RESPIRATION RATE: 24 BRPM | HEART RATE: 84 BPM

## 2019-01-15 DIAGNOSIS — R06.02 SHORTNESS OF BREATH: Primary | ICD-10-CM

## 2019-01-15 DIAGNOSIS — Z79.01 ANTICOAGULATED ON COUMADIN: ICD-10-CM

## 2019-01-15 LAB
INR: 2.2 (ref 0.8–1.2)
TEST STRIP EXPIRATION DATE: ABNORMAL DATE

## 2019-01-15 PROCEDURE — 85610 PROTHROMBIN TIME: CPT

## 2019-01-15 PROCEDURE — 93005 ELECTROCARDIOGRAM TRACING: CPT

## 2019-01-15 PROCEDURE — 93010 ELECTROCARDIOGRAM REPORT: CPT

## 2019-01-15 PROCEDURE — 71046 X-RAY EXAM CHEST 2 VIEWS: CPT | Performed by: EMERGENCY MEDICINE

## 2019-01-15 PROCEDURE — 99214 OFFICE O/P EST MOD 30 MIN: CPT

## 2019-01-15 PROCEDURE — 36416 COLLJ CAPILLARY BLOOD SPEC: CPT

## 2019-01-15 PROCEDURE — G0463 HOSPITAL OUTPT CLINIC VISIT: HCPCS

## 2019-01-15 PROCEDURE — 93010 ELECTROCARDIOGRAM REPORT: CPT | Performed by: EMERGENCY MEDICINE

## 2019-01-15 NOTE — ED PROVIDER NOTES
Patient Seen in: [facility]    History   Patient presents with:  Cough/URI    Stated Complaint: SOB    HPI  Patient is here with his daughter. The patient lives alone in a [facility]   He has family members that check on him Alcohol/week: 0.0 - 3.0 oz      Comment: 0-5 beers weekly    Drug use: No      Review of Systems    Positive for stated complaint: SOB  Other systems are as noted in HPI. Constitutional and vital signs reviewed.       All other systems reviewed and negativ CONCLUSION:   Negative for radiographically evident acute intrathoracic process. Radiographic findings of COPD are evident, with interval improvement in layering right pleural effusion.               EKG:        Indication: Shortness of breath        Rhythm

## 2019-01-15 NOTE — ED INITIAL ASSESSMENT (HPI)
Sick for several weeks with cough and congestion. No fever. Exertional SOB. Denies chest pain. Denies swelling to extremities.

## 2019-01-15 NOTE — TELEPHONE ENCOUNTER
To Dr.B Alejandro villagran here today for INR and his dtr was going to take him to urgent care [place] to have them check his breathing.    He sounded like he might have URI, but denies fever, SOB, phlegm, NV.    FYI

## 2019-01-17 NOTE — TELEPHONE ENCOUNTER
Telephone call to patient's daughter, Tristan Castillo. Patient was with another daughter yesterday and was taken to urgent care where chest x-ray apparently was clear. Cough but no fever or chills. The chest x-ray that was done did not show heart failure.   I talk

## 2019-02-19 ENCOUNTER — ANTI-COAG VISIT (OUTPATIENT)
Dept: INTERNAL MEDICINE CLINIC | Facility: CLINIC | Age: 79
End: 2019-02-19
Payer: MEDICARE

## 2019-02-19 DIAGNOSIS — Z79.01 ANTICOAGULATED ON COUMADIN: ICD-10-CM

## 2019-02-19 LAB
INR: 2 (ref 0.8–1.2)
TEST STRIP EXPIRATION DATE: ABNORMAL DATE

## 2019-02-19 PROCEDURE — 36416 COLLJ CAPILLARY BLOOD SPEC: CPT

## 2019-02-19 PROCEDURE — 85610 PROTHROMBIN TIME: CPT

## 2019-02-25 RX ORDER — HYDRALAZINE HYDROCHLORIDE 25 MG/1
TABLET, FILM COATED ORAL
Qty: 180 TABLET | Refills: 1 | Status: SHIPPED | OUTPATIENT
Start: 2019-02-25 | End: 2019-10-04

## 2019-02-28 VITALS
WEIGHT: 164.5 LBS | HEART RATE: 70 BPM | DIASTOLIC BLOOD PRESSURE: 60 MMHG | RESPIRATION RATE: 16 BRPM | SYSTOLIC BLOOD PRESSURE: 102 MMHG

## 2019-02-28 VITALS
HEART RATE: 70 BPM | SYSTOLIC BLOOD PRESSURE: 112 MMHG | WEIGHT: 161 LBS | BODY MASS INDEX: 23.85 KG/M2 | DIASTOLIC BLOOD PRESSURE: 68 MMHG | HEIGHT: 69 IN | OXYGEN SATURATION: 97 %

## 2019-02-28 VITALS
BODY MASS INDEX: 25.48 KG/M2 | WEIGHT: 172 LBS | DIASTOLIC BLOOD PRESSURE: 72 MMHG | RESPIRATION RATE: 18 BRPM | HEIGHT: 69 IN | SYSTOLIC BLOOD PRESSURE: 130 MMHG | HEART RATE: 73 BPM

## 2019-02-28 VITALS
HEART RATE: 72 BPM | WEIGHT: 172 LBS | OXYGEN SATURATION: 97 % | SYSTOLIC BLOOD PRESSURE: 120 MMHG | BODY MASS INDEX: 25.48 KG/M2 | RESPIRATION RATE: 14 BRPM | HEIGHT: 69 IN | DIASTOLIC BLOOD PRESSURE: 70 MMHG

## 2019-02-28 VITALS
WEIGHT: 168 LBS | HEART RATE: 71 BPM | DIASTOLIC BLOOD PRESSURE: 72 MMHG | SYSTOLIC BLOOD PRESSURE: 136 MMHG | RESPIRATION RATE: 16 BRPM

## 2019-02-28 VITALS
DIASTOLIC BLOOD PRESSURE: 70 MMHG | HEIGHT: 69 IN | OXYGEN SATURATION: 94 % | BODY MASS INDEX: 24.14 KG/M2 | SYSTOLIC BLOOD PRESSURE: 110 MMHG | WEIGHT: 163 LBS | HEART RATE: 81 BPM

## 2019-02-28 VITALS
HEART RATE: 70 BPM | WEIGHT: 176 LBS | RESPIRATION RATE: 16 BRPM | DIASTOLIC BLOOD PRESSURE: 72 MMHG | SYSTOLIC BLOOD PRESSURE: 96 MMHG

## 2019-03-03 RX ORDER — WARFARIN SODIUM 5 MG/1
TABLET ORAL
Qty: 90 TABLET | Refills: 1 | Status: SHIPPED | OUTPATIENT
Start: 2019-03-03 | End: 2019-12-26

## 2019-03-14 RX ORDER — ATORVASTATIN CALCIUM 20 MG/1
TABLET, FILM COATED ORAL
Qty: 90 TABLET | Refills: 1 | Status: SHIPPED | OUTPATIENT
Start: 2019-03-14 | End: 2019-09-18 | Stop reason: SDUPTHER

## 2019-03-19 ENCOUNTER — ANTI-COAG VISIT (OUTPATIENT)
Dept: INTERNAL MEDICINE CLINIC | Facility: CLINIC | Age: 79
End: 2019-03-19
Payer: MEDICARE

## 2019-03-19 DIAGNOSIS — Z79.01 ANTICOAGULATED ON COUMADIN: ICD-10-CM

## 2019-03-19 LAB
INR: 1.6 (ref 0.8–1.2)
TEST STRIP EXPIRATION DATE: ABNORMAL DATE

## 2019-03-19 PROCEDURE — G0463 HOSPITAL OUTPT CLINIC VISIT: HCPCS

## 2019-03-19 PROCEDURE — 36416 COLLJ CAPILLARY BLOOD SPEC: CPT

## 2019-03-19 PROCEDURE — 85610 PROTHROMBIN TIME: CPT

## 2019-04-03 RX ORDER — FLUOXETINE 10 MG/1
10 TABLET, FILM COATED ORAL DAILY
COMMUNITY

## 2019-04-03 RX ORDER — TORSEMIDE 20 MG/1
TABLET ORAL
COMMUNITY
Start: 2018-11-16 | End: 2019-05-17 | Stop reason: SDUPTHER

## 2019-04-03 RX ORDER — HYDRALAZINE HYDROCHLORIDE 25 MG/1
25 TABLET, FILM COATED ORAL 2 TIMES DAILY
COMMUNITY
Start: 2016-12-30

## 2019-04-03 RX ORDER — WARFARIN SODIUM 3 MG/1
3 TABLET ORAL
COMMUNITY
Start: 2015-12-04

## 2019-04-03 RX ORDER — CEFADROXIL 500 MG/1
CAPSULE ORAL
Status: ON HOLD | COMMUNITY
Start: 2016-12-30 | End: 2021-07-29

## 2019-04-03 RX ORDER — TORSEMIDE 10 MG/1
TABLET ORAL
Status: ON HOLD | COMMUNITY
Start: 2018-11-16 | End: 2021-07-29

## 2019-04-03 RX ORDER — FINASTERIDE 5 MG/1
1 TABLET, FILM COATED ORAL DAILY
COMMUNITY
Start: 2017-01-03 | End: 2019-06-11

## 2019-04-03 RX ORDER — FLUOXETINE 10 MG/1
CAPSULE ORAL DAILY
COMMUNITY
Start: 2015-07-14 | End: 2019-04-03 | Stop reason: CLARIF

## 2019-04-03 RX ORDER — FOLIC ACID 1 MG/1
1 TABLET ORAL DAILY
COMMUNITY
Start: 2015-07-14

## 2019-04-03 RX ORDER — ISOSORBIDE MONONITRATE 30 MG/1
TABLET, EXTENDED RELEASE ORAL
COMMUNITY
Start: 2018-05-31 | End: 2019-04-11 | Stop reason: SDUPTHER

## 2019-04-03 RX ORDER — CARVEDILOL 25 MG/1
1 TABLET ORAL 2 TIMES DAILY
COMMUNITY
Start: 2018-12-17 | End: 2019-06-07 | Stop reason: SDUPTHER

## 2019-04-03 RX ORDER — PREDNISONE 1 MG/1
2 TABLET ORAL DAILY
COMMUNITY
Start: 2017-01-03

## 2019-04-03 RX ORDER — SPIRONOLACTONE 25 MG/1
TABLET ORAL
COMMUNITY
Start: 2018-11-27 | End: 2019-06-25 | Stop reason: SDUPTHER

## 2019-04-11 RX ORDER — PREDNISONE 1 MG/1
TABLET ORAL
Qty: 180 TABLET | Refills: 3 | Status: SHIPPED | OUTPATIENT
Start: 2019-04-11 | End: 2019-12-17

## 2019-04-11 RX ORDER — ISOSORBIDE MONONITRATE 30 MG/1
TABLET, EXTENDED RELEASE ORAL
Qty: 60 TABLET | Refills: 7 | Status: SHIPPED | OUTPATIENT
Start: 2019-04-11 | End: 2020-01-04 | Stop reason: SDUPTHER

## 2019-04-11 NOTE — TELEPHONE ENCOUNTER
LOV: 4-4-18  Last Refilled:#180, 3rfs 3/19/18      Future Appointments   Date Time Provider Yobani Trevino   4/16/2019  2:45 PM EMA PHARMACIST DAIJA Fields   6/3/2019  1:00 PM Cassie Corbin MD [facility] HEM ONC EMO       Please advise.

## 2019-04-16 ENCOUNTER — ANTI-COAG VISIT (OUTPATIENT)
Dept: INTERNAL MEDICINE CLINIC | Facility: CLINIC | Age: 79
End: 2019-04-16
Payer: MEDICARE

## 2019-04-16 DIAGNOSIS — Z79.01 ANTICOAGULATED ON COUMADIN: ICD-10-CM

## 2019-04-16 PROCEDURE — 85610 PROTHROMBIN TIME: CPT

## 2019-04-16 PROCEDURE — 36416 COLLJ CAPILLARY BLOOD SPEC: CPT

## 2019-04-18 ENCOUNTER — OFFICE VISIT (OUTPATIENT)
Dept: PODIATRY CLINIC | Facility: CLINIC | Age: 79
End: 2019-04-18
Payer: MEDICARE

## 2019-04-18 DIAGNOSIS — M79.674 PAIN IN TOES OF BOTH FEET: Primary | ICD-10-CM

## 2019-04-18 DIAGNOSIS — M79.675 PAIN IN TOES OF BOTH FEET: Primary | ICD-10-CM

## 2019-04-18 DIAGNOSIS — B35.1 ONYCHOMYCOSIS: ICD-10-CM

## 2019-04-18 PROCEDURE — 11721 DEBRIDE NAIL 6 OR MORE: CPT | Performed by: PODIATRIST

## 2019-04-18 NOTE — PROGRESS NOTES
HPI:    Patient ID: Lara Smart is a 78year old male. This 78-year-old male presents to the office recurrent pain associated with his nails. I last saw this patient on January 3, 2019. He reports relief by previous treatment.     ROS:     I did re dystrophic nature of his toenails. All nails have changes consistent with chronic mycosis. There is thickness, subungual debris separation and change. There are no signs of infection no open or draining was noted.   Careful and complete debridement of al

## 2019-05-16 ENCOUNTER — ANTI-COAG VISIT (OUTPATIENT)
Dept: INTERNAL MEDICINE CLINIC | Facility: CLINIC | Age: 79
End: 2019-05-16
Payer: MEDICARE

## 2019-05-16 DIAGNOSIS — Z79.01 ANTICOAGULATED ON COUMADIN: ICD-10-CM

## 2019-05-16 PROCEDURE — 85610 PROTHROMBIN TIME: CPT

## 2019-05-16 PROCEDURE — 36416 COLLJ CAPILLARY BLOOD SPEC: CPT

## 2019-05-17 ENCOUNTER — APPOINTMENT (OUTPATIENT)
Dept: CARDIOLOGY | Age: 79
End: 2019-05-17
Attending: INTERNAL MEDICINE

## 2019-05-17 RX ORDER — TORSEMIDE 20 MG/1
TABLET ORAL
Qty: 30 TABLET | Refills: 2 | Status: SHIPPED | OUTPATIENT
Start: 2019-05-17 | End: 2019-09-18 | Stop reason: SDUPTHER

## 2019-06-03 ENCOUNTER — APPOINTMENT (OUTPATIENT)
Dept: HEMATOLOGY/ONCOLOGY | Facility: HOSPITAL | Age: 79
End: 2019-06-03
Attending: INTERNAL MEDICINE
Payer: MEDICARE

## 2019-06-06 PROBLEM — Z95.810 ICD (IMPLANTABLE CARDIOVERTER-DEFIBRILLATOR) IN PLACE: Status: ACTIVE | Noted: 2019-06-06

## 2019-06-06 PROBLEM — I50.22 HEART FAILURE, CHRONIC SYSTOLIC (CMD): Status: ACTIVE | Noted: 2019-06-06

## 2019-06-06 PROBLEM — R06.00 DYSPNEA: Status: ACTIVE | Noted: 2019-06-06

## 2019-06-06 PROBLEM — I10 HYPERTENSION: Status: ACTIVE | Noted: 2019-06-06

## 2019-06-06 PROBLEM — I48.91 AF (ATRIAL FIBRILLATION) (CMD): Status: ACTIVE | Noted: 2019-06-06

## 2019-06-06 PROBLEM — E78.2 HYPERLIPIDEMIA, MIXED: Status: ACTIVE | Noted: 2019-06-06

## 2019-06-10 RX ORDER — CARVEDILOL 25 MG/1
TABLET ORAL
Qty: 60 TABLET | Refills: 3 | Status: SHIPPED | OUTPATIENT
Start: 2019-06-10 | End: 2019-06-11 | Stop reason: SDUPTHER

## 2019-06-11 ENCOUNTER — ANCILLARY PROCEDURE (OUTPATIENT)
Dept: CARDIOLOGY | Age: 79
End: 2019-06-11
Attending: INTERNAL MEDICINE

## 2019-06-11 ENCOUNTER — APPOINTMENT (OUTPATIENT)
Dept: CARDIOLOGY | Age: 79
End: 2019-06-11

## 2019-06-11 ENCOUNTER — OFFICE VISIT (OUTPATIENT)
Dept: CARDIOLOGY | Age: 79
End: 2019-06-11

## 2019-06-11 VITALS
WEIGHT: 167 LBS | HEART RATE: 73 BPM | DIASTOLIC BLOOD PRESSURE: 60 MMHG | SYSTOLIC BLOOD PRESSURE: 108 MMHG | RESPIRATION RATE: 16 BRPM | BODY MASS INDEX: 24.66 KG/M2

## 2019-06-11 VITALS
DIASTOLIC BLOOD PRESSURE: 60 MMHG | SYSTOLIC BLOOD PRESSURE: 108 MMHG | WEIGHT: 167 LBS | HEART RATE: 72 BPM | BODY MASS INDEX: 24.66 KG/M2

## 2019-06-11 DIAGNOSIS — I10 ESSENTIAL HYPERTENSION: Primary | ICD-10-CM

## 2019-06-11 DIAGNOSIS — I48.21 PERMANENT ATRIAL FIBRILLATION (CMD): ICD-10-CM

## 2019-06-11 DIAGNOSIS — I50.22 HEART FAILURE, CHRONIC SYSTOLIC (CMD): ICD-10-CM

## 2019-06-11 DIAGNOSIS — Z45.02 IMPLANTABLE DEFIBRILLATOR REPROGRAMMING/CHECK: Primary | ICD-10-CM

## 2019-06-11 DIAGNOSIS — E78.2 HYPERLIPIDEMIA, MIXED: ICD-10-CM

## 2019-06-11 DIAGNOSIS — Z95.810 ICD (IMPLANTABLE CARDIOVERTER-DEFIBRILLATOR) IN PLACE: ICD-10-CM

## 2019-06-11 PROCEDURE — 93290 INTERROG DEV EVAL ICPMS IP: CPT | Performed by: INTERNAL MEDICINE

## 2019-06-11 PROCEDURE — 99214 OFFICE O/P EST MOD 30 MIN: CPT | Performed by: INTERNAL MEDICINE

## 2019-06-11 PROCEDURE — 93284 PRGRMG EVAL IMPLANTABLE DFB: CPT | Performed by: INTERNAL MEDICINE

## 2019-06-11 RX ORDER — METHOTREXATE 2.5 MG/1
5 TABLET ORAL
COMMUNITY
Start: 2013-12-09

## 2019-06-11 RX ORDER — CARVEDILOL 25 MG/1
25 TABLET ORAL 2 TIMES DAILY
Qty: 60 TABLET | Refills: 3 | Status: ON HOLD | OUTPATIENT
Start: 2019-06-11 | End: 2021-07-31 | Stop reason: SDUPTHER

## 2019-06-11 RX ORDER — ASPIRIN 81 MG/1
81 TABLET ORAL DAILY
COMMUNITY
Start: 2003-10-24

## 2019-06-20 ENCOUNTER — ANTI-COAG VISIT (OUTPATIENT)
Dept: INTERNAL MEDICINE CLINIC | Facility: CLINIC | Age: 79
End: 2019-06-20
Payer: MEDICARE

## 2019-06-20 DIAGNOSIS — Z79.01 ANTICOAGULATED ON COUMADIN: ICD-10-CM

## 2019-06-20 PROCEDURE — G0463 HOSPITAL OUTPT CLINIC VISIT: HCPCS

## 2019-06-20 PROCEDURE — 36416 COLLJ CAPILLARY BLOOD SPEC: CPT

## 2019-06-20 PROCEDURE — 85610 PROTHROMBIN TIME: CPT

## 2019-06-25 DIAGNOSIS — M05.79 SEROPOSITIVE RHEUMATOID ARTHRITIS OF MULTIPLE SITES (HCC): Primary | ICD-10-CM

## 2019-06-25 DIAGNOSIS — Z51.81 ENCOUNTER FOR THERAPEUTIC DRUG MONITORING: ICD-10-CM

## 2019-06-25 RX ORDER — SPIRONOLACTONE 25 MG/1
TABLET ORAL
Qty: 45 TABLET | Refills: 3 | Status: ON HOLD | OUTPATIENT
Start: 2019-06-25 | End: 2021-07-29

## 2019-06-25 NOTE — TELEPHONE ENCOUNTER
Last filled: 4/4/18 #26 tab with 3 refills   LOV: 4/4/18  Future Appointments   Date Time Provider Yobani Trevino   7/1/2019 11:30 AM Lv Hough MD [address] [place] [address] HEM ONC EMO   7/18/2019  1:15 PM EMA PHARMACIST EMASCHIM EMA [place] manriquez     Labs: 12/30/16    DORIS

## 2019-06-28 DIAGNOSIS — D63.1 ANEMIA IN STAGE 3 CHRONIC KIDNEY DISEASE (HCC): Primary | ICD-10-CM

## 2019-06-28 DIAGNOSIS — N18.30 ANEMIA DUE TO STAGE 3 CHRONIC KIDNEY DISEASE (HCC): ICD-10-CM

## 2019-06-28 DIAGNOSIS — D63.1 ANEMIA DUE TO STAGE 3 CHRONIC KIDNEY DISEASE (HCC): ICD-10-CM

## 2019-06-28 DIAGNOSIS — N18.30 ANEMIA IN STAGE 3 CHRONIC KIDNEY DISEASE (HCC): Primary | ICD-10-CM

## 2019-06-30 NOTE — PROGRESS NOTES
PARMINDER     Josephine Lemos is a 78year old male here for f/u of Anemia in stage 3 chronic kidney disease (hcc)  (primary encounter diagnosis)  Thrombocytopenia (hcc)     Pt here for follow up. Still on MTX/prednisone and folic acid. Feeling well. TAKE 1 TABLET (5 MG TOTAL) BY MOUTH DAILY. Disp: 90 tablet Rfl: 0   atorvastatin (LIPITOR) 20 MG Oral Tab Take 1 tablet (20 mg total) by mouth nightly.  take 1 tablet (20MG)  by oral route  every day Disp: 1 tablet Rfl: 0   carvedilol 25 MG Oral Tab Take 1 79   • Other (crohns) Son    • Other (alive and well) Son    • Other (neorological) Daughter    • Other (alive and well) Daughter    • Other (rheumatoid arthritis) Paternal Grandfather        /60 (BP Location: Left arm, Patient Position: Sitting, Cuf due to anticoagulation therapy and antiplatelet therapy, more than from thrombocytopenia. No orders of the defined types were placed in this encounter.       Results From Past 48 Hours:  Recent Results (from the past 48 hour(s))   CBC W/ DIFFERENTIAL

## 2019-07-01 ENCOUNTER — OFFICE VISIT (OUTPATIENT)
Dept: HEMATOLOGY/ONCOLOGY | Facility: HOSPITAL | Age: 79
End: 2019-07-01
Attending: INTERNAL MEDICINE
Payer: MEDICARE

## 2019-07-01 VITALS
DIASTOLIC BLOOD PRESSURE: 60 MMHG | TEMPERATURE: 97 F | BODY MASS INDEX: 24.71 KG/M2 | OXYGEN SATURATION: 93 % | SYSTOLIC BLOOD PRESSURE: 120 MMHG | RESPIRATION RATE: 18 BRPM | WEIGHT: 163 LBS | HEIGHT: 68 IN | HEART RATE: 84 BPM

## 2019-07-01 DIAGNOSIS — D63.1 ANEMIA IN STAGE 3 CHRONIC KIDNEY DISEASE (HCC): Primary | ICD-10-CM

## 2019-07-01 DIAGNOSIS — N18.30 ANEMIA IN STAGE 3 CHRONIC KIDNEY DISEASE (HCC): Primary | ICD-10-CM

## 2019-07-01 DIAGNOSIS — N18.30 ANEMIA IN STAGE 3 CHRONIC KIDNEY DISEASE (HCC): ICD-10-CM

## 2019-07-01 DIAGNOSIS — D63.1 ANEMIA IN STAGE 3 CHRONIC KIDNEY DISEASE (HCC): ICD-10-CM

## 2019-07-01 DIAGNOSIS — D69.6 THROMBOCYTOPENIA (HCC): ICD-10-CM

## 2019-07-01 DIAGNOSIS — N18.30 ANEMIA DUE TO STAGE 3 CHRONIC KIDNEY DISEASE (HCC): ICD-10-CM

## 2019-07-01 DIAGNOSIS — D63.1 ANEMIA DUE TO STAGE 3 CHRONIC KIDNEY DISEASE (HCC): ICD-10-CM

## 2019-07-01 LAB
BASOPHILS # BLD AUTO: 0.02 X10(3) UL (ref 0–0.2)
BASOPHILS NFR BLD AUTO: 0.3 %
DEPRECATED RDW RBC AUTO: 50.6 FL (ref 35.1–46.3)
EOSINOPHIL # BLD AUTO: 0.15 X10(3) UL (ref 0–0.7)
EOSINOPHIL NFR BLD AUTO: 2.4 %
ERYTHROCYTE [DISTWIDTH] IN BLOOD BY AUTOMATED COUNT: 14 % (ref 11–15)
HCT VFR BLD AUTO: 34.1 % (ref 39–53)
HGB BLD-MCNC: 11.5 G/DL (ref 13–17.5)
IMM GRANULOCYTES # BLD AUTO: 0.02 X10(3) UL (ref 0–1)
IMM GRANULOCYTES NFR BLD: 0.3 %
LYMPHOCYTES # BLD AUTO: 0.41 X10(3) UL (ref 1–4)
LYMPHOCYTES NFR BLD AUTO: 6.4 %
MCH RBC QN AUTO: 33.7 PG (ref 26–34)
MCHC RBC AUTO-ENTMCNC: 33.7 G/DL (ref 31–37)
MCV RBC AUTO: 100 FL (ref 80–100)
MONOCYTES # BLD AUTO: 0.71 X10(3) UL (ref 0.1–1)
MONOCYTES NFR BLD AUTO: 11.1 %
NEUTROPHILS # BLD AUTO: 5.07 X10 (3) UL (ref 1.5–7.7)
NEUTROPHILS # BLD AUTO: 5.07 X10(3) UL (ref 1.5–7.7)
NEUTROPHILS NFR BLD AUTO: 79.5 %
PLATELET # BLD AUTO: 104 10(3)UL (ref 150–450)
RBC # BLD AUTO: 3.41 X10(6)UL (ref 3.8–5.8)
WBC # BLD AUTO: 6.4 X10(3) UL (ref 4–11)

## 2019-07-01 PROCEDURE — 99213 OFFICE O/P EST LOW 20 MIN: CPT | Performed by: INTERNAL MEDICINE

## 2019-07-01 PROCEDURE — 85025 COMPLETE CBC W/AUTO DIFF WBC: CPT

## 2019-07-01 PROCEDURE — 36415 COLL VENOUS BLD VENIPUNCTURE: CPT

## 2019-07-25 ENCOUNTER — ANTI-COAG VISIT (OUTPATIENT)
Dept: INTERNAL MEDICINE CLINIC | Facility: CLINIC | Age: 79
End: 2019-07-25
Payer: MEDICARE

## 2019-07-25 DIAGNOSIS — D63.1 ANEMIA IN STAGE 3 CHRONIC KIDNEY DISEASE (HCC): ICD-10-CM

## 2019-07-25 DIAGNOSIS — Z79.01 ANTICOAGULATED ON COUMADIN: ICD-10-CM

## 2019-07-25 DIAGNOSIS — N18.30 ANEMIA IN STAGE 3 CHRONIC KIDNEY DISEASE (HCC): ICD-10-CM

## 2019-07-25 DIAGNOSIS — N18.30 ANEMIA DUE TO STAGE 3 CHRONIC KIDNEY DISEASE (HCC): ICD-10-CM

## 2019-07-25 DIAGNOSIS — D63.1 ANEMIA DUE TO STAGE 3 CHRONIC KIDNEY DISEASE (HCC): ICD-10-CM

## 2019-07-25 LAB
INR: 4 (ref 0.8–1.2)
TEST STRIP EXPIRATION DATE: ABNORMAL DATE

## 2019-07-25 PROCEDURE — 36416 COLLJ CAPILLARY BLOOD SPEC: CPT

## 2019-07-25 PROCEDURE — G0463 HOSPITAL OUTPT CLINIC VISIT: HCPCS

## 2019-07-25 PROCEDURE — 99211 OFF/OP EST MAY X REQ PHY/QHP: CPT

## 2019-07-25 PROCEDURE — 85610 PROTHROMBIN TIME: CPT

## 2019-08-01 ENCOUNTER — ANTI-COAG VISIT (OUTPATIENT)
Dept: INTERNAL MEDICINE CLINIC | Facility: CLINIC | Age: 79
End: 2019-08-01
Payer: MEDICARE

## 2019-08-01 DIAGNOSIS — Z79.01 ANTICOAGULATED ON COUMADIN: ICD-10-CM

## 2019-08-01 LAB
INR: 1.6 (ref 0.8–1.2)
TEST STRIP EXPIRATION DATE: ABNORMAL DATE

## 2019-08-01 PROCEDURE — 85610 PROTHROMBIN TIME: CPT

## 2019-08-01 PROCEDURE — 36416 COLLJ CAPILLARY BLOOD SPEC: CPT

## 2019-08-16 ENCOUNTER — ANTI-COAG VISIT (OUTPATIENT)
Dept: INTERNAL MEDICINE CLINIC | Facility: CLINIC | Age: 79
End: 2019-08-16
Payer: MEDICARE

## 2019-08-16 DIAGNOSIS — Z79.01 ANTICOAGULATED ON COUMADIN: ICD-10-CM

## 2019-08-16 LAB
INR: 2.1 (ref 0.8–1.2)
TEST STRIP EXPIRATION DATE: ABNORMAL DATE

## 2019-08-16 PROCEDURE — 99211 OFF/OP EST MAY X REQ PHY/QHP: CPT

## 2019-08-16 PROCEDURE — G0463 HOSPITAL OUTPT CLINIC VISIT: HCPCS

## 2019-08-16 PROCEDURE — 85610 PROTHROMBIN TIME: CPT

## 2019-08-16 PROCEDURE — 36416 COLLJ CAPILLARY BLOOD SPEC: CPT

## 2019-09-16 ENCOUNTER — ANTI-COAG VISIT (OUTPATIENT)
Dept: INTERNAL MEDICINE CLINIC | Facility: CLINIC | Age: 79
End: 2019-09-16
Payer: MEDICARE

## 2019-09-16 DIAGNOSIS — Z79.01 ANTICOAGULATED ON COUMADIN: ICD-10-CM

## 2019-09-16 LAB
INR: 2.4 (ref 0.8–1.2)
TEST STRIP EXPIRATION DATE: ABNORMAL DATE

## 2019-09-16 PROCEDURE — G0463 HOSPITAL OUTPT CLINIC VISIT: HCPCS

## 2019-09-16 PROCEDURE — 85610 PROTHROMBIN TIME: CPT

## 2019-09-16 PROCEDURE — 36416 COLLJ CAPILLARY BLOOD SPEC: CPT

## 2019-09-18 RX ORDER — TORSEMIDE 20 MG/1
TABLET ORAL
Qty: 30 TABLET | Refills: 1 | Status: SHIPPED | OUTPATIENT
Start: 2019-09-18 | End: 2019-12-26 | Stop reason: SDUPTHER

## 2019-09-18 RX ORDER — ATORVASTATIN CALCIUM 20 MG/1
TABLET, FILM COATED ORAL
Qty: 30 TABLET | Refills: 0 | Status: SHIPPED | OUTPATIENT
Start: 2019-09-18 | End: 2019-11-09 | Stop reason: SDUPTHER

## 2019-09-24 ENCOUNTER — OFFICE VISIT (OUTPATIENT)
Dept: PODIATRY CLINIC | Facility: CLINIC | Age: 79
End: 2019-09-24
Payer: MEDICARE

## 2019-09-24 DIAGNOSIS — M79.675 PAIN IN TOES OF BOTH FEET: Primary | ICD-10-CM

## 2019-09-24 DIAGNOSIS — M79.674 PAIN IN TOES OF BOTH FEET: Primary | ICD-10-CM

## 2019-09-24 DIAGNOSIS — B35.1 ONYCHOMYCOSIS: ICD-10-CM

## 2019-09-24 PROCEDURE — 11721 DEBRIDE NAIL 6 OR MORE: CPT | Performed by: PODIATRIST

## 2019-09-24 NOTE — PROGRESS NOTES
HPI:    Patient ID: Connie Gutierrez is a 78year old male. This 57-year-old male presents with recurrent pain associated with his toenails. His daughter reports that he had relief by previous treatment.       ROS:     I did review medical status medicatio toenails. All nails have changes consistent with chronic long-standing mycosis. There is thickness, subungual debris separation and change. Patient's only option of care is periodic debridement.   Careful and complete debridement of all 10 toenails was a

## 2019-10-05 RX ORDER — HYDRALAZINE HYDROCHLORIDE 25 MG/1
TABLET, FILM COATED ORAL
Qty: 180 TABLET | Refills: 1 | Status: SHIPPED | OUTPATIENT
Start: 2019-10-05

## 2019-10-25 ENCOUNTER — ANTI-COAG VISIT (OUTPATIENT)
Dept: INTERNAL MEDICINE CLINIC | Facility: CLINIC | Age: 79
End: 2019-10-25
Payer: MEDICARE

## 2019-10-25 DIAGNOSIS — Z79.01 ANTICOAGULATED ON COUMADIN: ICD-10-CM

## 2019-10-25 PROCEDURE — 85610 PROTHROMBIN TIME: CPT

## 2019-10-25 PROCEDURE — 90662 IIV NO PRSV INCREASED AG IM: CPT | Performed by: INTERNAL MEDICINE

## 2019-10-25 PROCEDURE — 36416 COLLJ CAPILLARY BLOOD SPEC: CPT

## 2019-10-25 PROCEDURE — G0008 ADMIN INFLUENZA VIRUS VAC: HCPCS | Performed by: INTERNAL MEDICINE

## 2019-10-30 RX ORDER — ATORVASTATIN CALCIUM 20 MG/1
TABLET, FILM COATED ORAL
Qty: 30 TABLET | Refills: 0 | OUTPATIENT
Start: 2019-10-30

## 2019-10-30 RX ORDER — METHOTREXATE 2.5 MG/1
TABLET ORAL
Qty: 25 TABLET | Refills: 0 | OUTPATIENT
Start: 2019-10-30

## 2019-11-04 RX ORDER — METHOTREXATE 2.5 MG/1
TABLET ORAL
Qty: 26 TABLET | Refills: 0 | OUTPATIENT
Start: 2019-11-04

## 2019-11-04 RX ORDER — ATORVASTATIN CALCIUM 20 MG/1
TABLET, FILM COATED ORAL
Qty: 30 TABLET | Refills: 0 | OUTPATIENT
Start: 2019-11-04

## 2019-11-04 NOTE — TELEPHONE ENCOUNTER
LOV: 4-4-18  Last Refilled:#26, 0rfs 6/26/19  Labs:AST 16  2/22/18    Future Appointments   Date Time Provider Yobani Trevino   11/22/2019 11:45 AM EMA PHARMACIST EMASCHIM ALISIA Fields   1/15/2020 11:00 AM [facility] RESOURCE [facility] HEM ONC EMO   1/15/2020 11:30 AM Trevor Forte MD [facility] HEM ONC EMO       Please advise.

## 2019-11-11 RX ORDER — ATORVASTATIN CALCIUM 20 MG/1
TABLET, FILM COATED ORAL
Qty: 30 TABLET | Refills: 0 | Status: SHIPPED | OUTPATIENT
Start: 2019-11-11 | End: 2019-12-17 | Stop reason: SDUPTHER

## 2019-11-13 RX ORDER — METHOTREXATE 2.5 MG/1
TABLET ORAL
Qty: 26 TABLET | Refills: 0 | OUTPATIENT
Start: 2019-11-13

## 2019-11-13 NOTE — TELEPHONE ENCOUNTER
LOV: 4-4-18  Last Dolf@[facility], 0rfs 6/26/19  Labs:??    Future Appointments   Date Time Provider Yobani Trevino   11/22/2019 11:45 AM EMA PHARMACIST DAIJA Fields   1/15/2020 11:00 AM CMA RESOURCE [facility] HEM ONC EMO   1/15/2020 11:30 AM Leann Flowers MD [facility] HEM ONC EMO       Please advise.

## 2019-11-15 RX ORDER — METHOTREXATE 2.5 MG/1
TABLET ORAL
Qty: 25 TABLET | Refills: 0 | OUTPATIENT
Start: 2019-11-15

## 2019-11-15 NOTE — TELEPHONE ENCOUNTER
LOV: 4-4-18  Last Refilled:#26, 0rfs 6/26/19  Labs:AST ? ? Future Appointments   Date Time Provider Yobani Belinda   11/22/2019 11:45 AM [facility]AC[facility]   12/19/2019  2:00 PM Bresnahan, Donavan Favre[facility]   1/15/2020 11:00 AM [facility] RESOURCE [facility] HEM ONC EMO   1/15/2020 11:30 AM Michael Zuluaga MD [facility] HEM ONC EMO       Please advise.

## 2019-11-22 ENCOUNTER — ANTI-COAG VISIT (OUTPATIENT)
Dept: INTERNAL MEDICINE CLINIC | Facility: CLINIC | Age: 79
End: 2019-11-22
Payer: MEDICARE

## 2019-11-22 DIAGNOSIS — Z79.01 ANTICOAGULATED ON COUMADIN: ICD-10-CM

## 2019-11-22 PROCEDURE — 85610 PROTHROMBIN TIME: CPT

## 2019-11-22 PROCEDURE — 36416 COLLJ CAPILLARY BLOOD SPEC: CPT

## 2019-12-01 ENCOUNTER — PATIENT MESSAGE (OUTPATIENT)
Dept: RHEUMATOLOGY | Facility: CLINIC | Age: 79
End: 2019-12-01

## 2019-12-02 NOTE — TELEPHONE ENCOUNTER
From: Angelina Pope  To: Rosie Elena MD  Sent: 12/1/2019 9:41 PM CST  Subject: Prescription Question    Hi,    Can you please refill my dads prescription? I have requested this multiple times thru MyChart and I understand the pharmacy has as well.

## 2019-12-02 NOTE — TELEPHONE ENCOUNTER
LOV 4/4/2018. Recent methotrexate refill requests denied by Dr. Nikolay Ansari. Pt was instructed to schedule f/u and complete labs (see 6/25 encounter). Attempted to contact pt to schedule appt - no answer, LMTCB. [employer] message sent to pt.

## 2019-12-02 NOTE — TELEPHONE ENCOUNTER
Dr. Jennifer Lockwood - please see below. LOV 4/4/2018. CBC completed 7/1/2019, no recent results for remaining monitoring labs. Methotrexate last filled on 6/26/2019 for 3 month supply.     Okay to inform pt medication will be refilled at f/u appt (not yet schedul

## 2019-12-04 ENCOUNTER — TELEPHONE (OUTPATIENT)
Dept: RHEUMATOLOGY | Facility: CLINIC | Age: 79
End: 2019-12-04

## 2019-12-04 NOTE — TELEPHONE ENCOUNTER
Patient sent in [address] request for a follow up appointment for medication renewal.     The only availability stated is 12/17. Please advise if patient can be added to schedule, as of right now Dr. Avinash Orantes is over-booked on this date. Please advise.

## 2019-12-14 ENCOUNTER — LAB ENCOUNTER (OUTPATIENT)
Dept: LAB | Age: 79
End: 2019-12-14
Attending: INTERNAL MEDICINE
Payer: MEDICARE

## 2019-12-14 DIAGNOSIS — Z51.81 ENCOUNTER FOR THERAPEUTIC DRUG MONITORING: ICD-10-CM

## 2019-12-14 DIAGNOSIS — M05.79 SEROPOSITIVE RHEUMATOID ARTHRITIS OF MULTIPLE SITES (HCC): ICD-10-CM

## 2019-12-14 PROCEDURE — 86140 C-REACTIVE PROTEIN: CPT

## 2019-12-14 PROCEDURE — 84450 TRANSFERASE (AST) (SGOT): CPT

## 2019-12-14 PROCEDURE — 85652 RBC SED RATE AUTOMATED: CPT

## 2019-12-14 PROCEDURE — 36415 COLL VENOUS BLD VENIPUNCTURE: CPT

## 2019-12-14 PROCEDURE — 82565 ASSAY OF CREATININE: CPT

## 2019-12-14 PROCEDURE — 85025 COMPLETE CBC W/AUTO DIFF WBC: CPT

## 2019-12-14 PROCEDURE — 82040 ASSAY OF SERUM ALBUMIN: CPT

## 2019-12-14 NOTE — PROGRESS NOTES
Jah Loyd is an 75-year-old patient of Dr. Elizabeth Fang with RF positive rheumatoid arthritis. Since I last saw him April 4th of 2018, he has generally remained on methotrexate 5 mg weekly and 2 mg of prednisone every morning.  He denies fatigue or stomach upset mass. There is no tenderness. There is no rebound and no guarding. Musculoskeletal: He exhibits no edema. There is no synovitis in his elbows, wrists, or hands.  strength is good bilaterally. Lymphadenopathy:     He has no cervical adenopathy.

## 2019-12-17 ENCOUNTER — OFFICE VISIT (OUTPATIENT)
Dept: CARDIOLOGY | Age: 79
End: 2019-12-17

## 2019-12-17 ENCOUNTER — ANCILLARY PROCEDURE (OUTPATIENT)
Dept: CARDIOLOGY | Age: 79
End: 2019-12-17
Attending: INTERNAL MEDICINE

## 2019-12-17 ENCOUNTER — OFFICE VISIT (OUTPATIENT)
Dept: RHEUMATOLOGY | Facility: CLINIC | Age: 79
End: 2019-12-17
Payer: MEDICARE

## 2019-12-17 VITALS
BODY MASS INDEX: 23.79 KG/M2 | HEART RATE: 74 BPM | WEIGHT: 157 LBS | DIASTOLIC BLOOD PRESSURE: 56 MMHG | SYSTOLIC BLOOD PRESSURE: 92 MMHG | HEIGHT: 68 IN

## 2019-12-17 VITALS
OXYGEN SATURATION: 96 % | HEART RATE: 70 BPM | RESPIRATION RATE: 16 BRPM | BODY MASS INDEX: 23.18 KG/M2 | SYSTOLIC BLOOD PRESSURE: 108 MMHG | DIASTOLIC BLOOD PRESSURE: 60 MMHG | WEIGHT: 157 LBS

## 2019-12-17 VITALS
WEIGHT: 157 LBS | HEART RATE: 70 BPM | DIASTOLIC BLOOD PRESSURE: 60 MMHG | RESPIRATION RATE: 18 BRPM | BODY MASS INDEX: 23.18 KG/M2 | SYSTOLIC BLOOD PRESSURE: 108 MMHG

## 2019-12-17 DIAGNOSIS — Z95.810 ICD (IMPLANTABLE CARDIOVERTER-DEFIBRILLATOR) IN PLACE: ICD-10-CM

## 2019-12-17 DIAGNOSIS — Z45.02 IMPLANTABLE DEFIBRILLATOR REPROGRAMMING/CHECK: Primary | ICD-10-CM

## 2019-12-17 DIAGNOSIS — I50.9 CONGESTIVE HEART FAILURE, UNSPECIFIED HF CHRONICITY, UNSPECIFIED HEART FAILURE TYPE (HCC): ICD-10-CM

## 2019-12-17 DIAGNOSIS — I48.21 PERMANENT ATRIAL FIBRILLATION (CMD): ICD-10-CM

## 2019-12-17 DIAGNOSIS — I50.22 HEART FAILURE, CHRONIC SYSTOLIC (CMD): Primary | ICD-10-CM

## 2019-12-17 DIAGNOSIS — Z51.81 ENCOUNTER FOR THERAPEUTIC DRUG MONITORING: ICD-10-CM

## 2019-12-17 DIAGNOSIS — M05.79 SEROPOSITIVE RHEUMATOID ARTHRITIS OF MULTIPLE SITES (HCC): Primary | ICD-10-CM

## 2019-12-17 DIAGNOSIS — I10 ESSENTIAL HYPERTENSION: ICD-10-CM

## 2019-12-17 PROCEDURE — 93284 PRGRMG EVAL IMPLANTABLE DFB: CPT | Performed by: INTERNAL MEDICINE

## 2019-12-17 PROCEDURE — G0463 HOSPITAL OUTPT CLINIC VISIT: HCPCS | Performed by: INTERNAL MEDICINE

## 2019-12-17 PROCEDURE — 99214 OFFICE O/P EST MOD 30 MIN: CPT | Performed by: INTERNAL MEDICINE

## 2019-12-17 RX ORDER — PREDNISONE 1 MG/1
TABLET ORAL
Qty: 180 TABLET | Refills: 3 | Status: SHIPPED | OUTPATIENT
Start: 2019-12-17 | End: 2021-01-01

## 2019-12-18 ENCOUNTER — TELEPHONE (OUTPATIENT)
Dept: CARDIOLOGY | Age: 79
End: 2019-12-18

## 2019-12-18 DIAGNOSIS — I15.9 SECONDARY HYPERTENSION: Primary | ICD-10-CM

## 2019-12-18 DIAGNOSIS — E78.2 HYPERLIPIDEMIA, MIXED: ICD-10-CM

## 2019-12-18 RX ORDER — ATORVASTATIN CALCIUM 20 MG/1
TABLET, FILM COATED ORAL
Qty: 30 TABLET | Refills: 0 | Status: SHIPPED | OUTPATIENT
Start: 2019-12-18 | End: 2020-01-17 | Stop reason: SDUPTHER

## 2019-12-19 ENCOUNTER — ANTI-COAG VISIT (OUTPATIENT)
Dept: INTERNAL MEDICINE CLINIC | Facility: CLINIC | Age: 79
End: 2019-12-19
Payer: MEDICARE

## 2019-12-19 ENCOUNTER — OFFICE VISIT (OUTPATIENT)
Dept: INTERNAL MEDICINE CLINIC | Facility: CLINIC | Age: 79
End: 2019-12-19
Payer: MEDICARE

## 2019-12-19 ENCOUNTER — APPOINTMENT (OUTPATIENT)
Dept: LAB | Age: 79
End: 2019-12-19
Attending: INTERNAL MEDICINE
Payer: MEDICARE

## 2019-12-19 ENCOUNTER — TELEPHONE (OUTPATIENT)
Dept: INTERNAL MEDICINE CLINIC | Facility: CLINIC | Age: 79
End: 2019-12-19

## 2019-12-19 VITALS
DIASTOLIC BLOOD PRESSURE: 60 MMHG | OXYGEN SATURATION: 98 % | TEMPERATURE: 97 F | SYSTOLIC BLOOD PRESSURE: 100 MMHG | BODY MASS INDEX: 24 KG/M2 | WEIGHT: 156.81 LBS | HEART RATE: 68 BPM

## 2019-12-19 DIAGNOSIS — I42.9 CARDIOMYOPATHY, UNSPECIFIED TYPE (HCC): ICD-10-CM

## 2019-12-19 DIAGNOSIS — R53.83 FATIGUE, UNSPECIFIED TYPE: ICD-10-CM

## 2019-12-19 DIAGNOSIS — J43.9 PULMONARY EMPHYSEMA, UNSPECIFIED EMPHYSEMA TYPE (HCC): ICD-10-CM

## 2019-12-19 DIAGNOSIS — D63.1 ANEMIA IN STAGE 3 CHRONIC KIDNEY DISEASE (HCC): ICD-10-CM

## 2019-12-19 DIAGNOSIS — N18.30 CKD (CHRONIC KIDNEY DISEASE), STAGE III (HCC): ICD-10-CM

## 2019-12-19 DIAGNOSIS — Z95.810 ICD (IMPLANTABLE CARDIOVERTER-DEFIBRILLATOR) IN PLACE: ICD-10-CM

## 2019-12-19 DIAGNOSIS — E78.00 HYPERCHOLESTEREMIA: ICD-10-CM

## 2019-12-19 DIAGNOSIS — I10 ESSENTIAL HYPERTENSION: ICD-10-CM

## 2019-12-19 DIAGNOSIS — Z79.01 ANTICOAGULATED ON COUMADIN: ICD-10-CM

## 2019-12-19 DIAGNOSIS — D69.6 THROMBOCYTOPENIA (HCC): ICD-10-CM

## 2019-12-19 DIAGNOSIS — M05.79 SEROPOSITIVE RHEUMATOID ARTHRITIS OF MULTIPLE SITES (HCC): ICD-10-CM

## 2019-12-19 DIAGNOSIS — I48.0 PAROXYSMAL ATRIAL FIBRILLATION (HCC): ICD-10-CM

## 2019-12-19 DIAGNOSIS — I50.23 ACUTE ON CHRONIC SYSTOLIC CONGESTIVE HEART FAILURE (HCC): Primary | ICD-10-CM

## 2019-12-19 DIAGNOSIS — N18.30 ANEMIA IN STAGE 3 CHRONIC KIDNEY DISEASE (HCC): ICD-10-CM

## 2019-12-19 LAB
ALBUMIN SERPL-MCNC: 3.1 G/DL
ALBUMIN/GLOB SERPL: 0.8 {RATIO}
ALP SERPL-CCNC: 91 U/L
ALT SERPL-CCNC: 16 U/L
ANION GAP SERPL CALC-SCNC: 6 MMOL/L
AST SERPL-CCNC: 16 U/L
BILIRUB SERPL-MCNC: 1.6 MG/DL
BUN SERPL-MCNC: 29 MG/DL
BUN/CREAT SERPL: 18.1
CALCIUM SERPL-MCNC: 9 MG/DL
CHLORIDE SERPL-SCNC: 101 MMOL/L
CHOLEST SERPL-MCNC: 96 MG/DL
CHOLEST/HDLC SERPL: NORMAL {RATIO}
CO2 SERPL-SCNC: 30 MMOL/L
CREAT SERPL-MCNC: 1.6 MG/DL
GLOBULIN SER-MCNC: 3.9 G/DL
GLUCOSE SERPL-MCNC: 100 MG/DL
HDLC SERPL-MCNC: 33 MG/DL
LDLC SERPL CALC-MCNC: 41 MG/DL
LENGTH OF FAST TIME PATIENT: NORMAL H
LENGTH OF FAST TIME PATIENT: NORMAL H
NONHDLC SERPL-MCNC: 63 MG/DL
POTASSIUM SERPL-SCNC: 3.7 MMOL/L
PROT SERPL-MCNC: 7 G/DL
SODIUM SERPL-SCNC: 137 MMOL/L
TRIGL SERPL-MCNC: 109 MG/DL
TSH SERPL-ACNC: 7.26 M[IU]/L
VLDLC SERPL CALC-MCNC: 22 MG/DL

## 2019-12-19 PROCEDURE — 99214 OFFICE O/P EST MOD 30 MIN: CPT | Performed by: INTERNAL MEDICINE

## 2019-12-19 PROCEDURE — G0463 HOSPITAL OUTPT CLINIC VISIT: HCPCS | Performed by: INTERNAL MEDICINE

## 2019-12-19 PROCEDURE — 84443 ASSAY THYROID STIM HORMONE: CPT

## 2019-12-19 PROCEDURE — 85610 PROTHROMBIN TIME: CPT

## 2019-12-19 PROCEDURE — 80061 LIPID PANEL: CPT

## 2019-12-19 PROCEDURE — 80053 COMPREHEN METABOLIC PANEL: CPT

## 2019-12-19 PROCEDURE — 36415 COLL VENOUS BLD VENIPUNCTURE: CPT

## 2019-12-19 NOTE — PATIENT INSTRUCTIONS
1.  Patient is to continue his current diet, medication and activity. 2.  Patient will have blood test today including a INR/pro time, CMP, lipid panel, and TSH. 3.  I will tentatively plan to see the patient back in about 3 months.   4.  I will see the p

## 2019-12-19 NOTE — PROGRESS NOTES
Arleen Puentes is a 78year old male. Patient presents with:  Checkup: Daughter would like to discuss COPD/starting an inhaler, feels it is getting wose. Woud like order for seated walker.    Fatigue  Congestive Heart Failure  Hypertension  Hyperlipidemia Oral Tab Take 1 tablet (25 mg total) by mouth 2 (two) times daily with meals. take 1 tablet (25MG)  by oral route 2 times every day with food 1 tablet 0   • spironolactone (ALDACTONE) 25 MG Oral Tab Take 25 mg by mouth daily.      • ISOSORBIDE MONONITRATE 3 normal S1S2, without murmur   GI:Protuberant, BS are present, no organomegaly or palpable masses  EXTREMITIES: no edema  NEURO: alert and oriented  ASSESSMENT AND PLAN:   There are no diagnoses linked to this encounter.     Acute on chronic systolic congest

## 2019-12-23 ENCOUNTER — ANTI-COAG VISIT (OUTPATIENT)
Dept: INTERNAL MEDICINE CLINIC | Facility: CLINIC | Age: 79
End: 2019-12-23
Payer: MEDICARE

## 2019-12-23 DIAGNOSIS — Z79.01 ANTICOAGULATED ON COUMADIN: ICD-10-CM

## 2019-12-23 PROCEDURE — 36416 COLLJ CAPILLARY BLOOD SPEC: CPT

## 2019-12-23 PROCEDURE — G0463 HOSPITAL OUTPT CLINIC VISIT: HCPCS

## 2019-12-23 PROCEDURE — 85610 PROTHROMBIN TIME: CPT

## 2019-12-26 RX ORDER — TORSEMIDE 20 MG/1
TABLET ORAL
Qty: 30 TABLET | Refills: 5 | Status: SHIPPED | OUTPATIENT
Start: 2019-12-26

## 2019-12-26 RX ORDER — WARFARIN SODIUM 2 MG/1
2 TABLET ORAL NIGHTLY
Qty: 90 TABLET | Refills: 1 | Status: SHIPPED | OUTPATIENT
Start: 2019-12-26 | End: 2020-07-24

## 2019-12-30 ENCOUNTER — PATIENT OUTREACH (OUTPATIENT)
Dept: CASE MANAGEMENT | Age: 79
End: 2019-12-30

## 2019-12-31 ENCOUNTER — ANTI-COAG VISIT (OUTPATIENT)
Dept: INTERNAL MEDICINE CLINIC | Facility: CLINIC | Age: 79
End: 2019-12-31
Payer: MEDICARE

## 2019-12-31 DIAGNOSIS — Z79.01 ANTICOAGULATED ON COUMADIN: ICD-10-CM

## 2019-12-31 PROCEDURE — 36416 COLLJ CAPILLARY BLOOD SPEC: CPT

## 2019-12-31 PROCEDURE — 85610 PROTHROMBIN TIME: CPT

## 2019-12-31 PROCEDURE — G0463 HOSPITAL OUTPT CLINIC VISIT: HCPCS

## 2020-01-01 ENCOUNTER — TELEPHONE (OUTPATIENT)
Dept: INTERNAL MEDICINE CLINIC | Facility: CLINIC | Age: 80
End: 2020-01-01

## 2020-01-01 DIAGNOSIS — Z79.01 ANTICOAGULATED ON COUMADIN: ICD-10-CM

## 2020-01-06 RX ORDER — FOLIC ACID 1 MG/1
TABLET ORAL
Qty: 90 TABLET | Refills: 3 | Status: SHIPPED | OUTPATIENT
Start: 2020-01-06

## 2020-01-06 RX ORDER — ISOSORBIDE MONONITRATE 30 MG/1
TABLET, EXTENDED RELEASE ORAL
Qty: 60 TABLET | Refills: 6 | Status: ON HOLD | OUTPATIENT
Start: 2020-01-06 | End: 2021-07-29

## 2020-01-06 NOTE — TELEPHONE ENCOUNTER
LOV: 12-17-19  Last Refilled:#90, 3rfs 11/26/18    Future Appointments   Date Time Provider Yobani Trevino   1/17/2020 10:00 AM CMA RESOURCE [address] [place] [address] HEM ONC EMO   1/17/2020 10:30 AM Myrna Little APRN [address] [place] [address] HEM ONC EMO   3/20/2020  2:00 PM Ramos Rg

## 2020-01-09 ENCOUNTER — OFFICE VISIT (OUTPATIENT)
Dept: PODIATRY CLINIC | Facility: CLINIC | Age: 80
End: 2020-01-09
Payer: MEDICARE

## 2020-01-09 DIAGNOSIS — B35.1 ONYCHOMYCOSIS: ICD-10-CM

## 2020-01-09 DIAGNOSIS — M79.674 PAIN IN TOES OF BOTH FEET: Primary | ICD-10-CM

## 2020-01-09 DIAGNOSIS — M79.675 PAIN IN TOES OF BOTH FEET: Primary | ICD-10-CM

## 2020-01-09 PROCEDURE — 11721 DEBRIDE NAIL 6 OR MORE: CPT | Performed by: PODIATRIST

## 2020-01-09 NOTE — PROGRESS NOTES
HPI:    Patient ID: Ekaterina Torres is a 78year old male. This 55-year-old male presents for care with his painful toenails. He is accompanied today by his daughter.   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 Ace Inhibitors              Comment:angioedema  Lisinopril                  Comment:Other reaction(s): angioedema   PHYSICAL EXAM:   On physical exam pedal pulses are noted. There is no evidence of edema nor erythema.

## 2020-01-15 ENCOUNTER — APPOINTMENT (OUTPATIENT)
Dept: HEMATOLOGY/ONCOLOGY | Facility: HOSPITAL | Age: 80
End: 2020-01-15
Attending: INTERNAL MEDICINE
Payer: MEDICARE

## 2020-01-15 DIAGNOSIS — N18.30 ANEMIA DUE TO STAGE 3 CHRONIC KIDNEY DISEASE (HCC): ICD-10-CM

## 2020-01-15 DIAGNOSIS — D63.1 ANEMIA DUE TO STAGE 3 CHRONIC KIDNEY DISEASE (HCC): ICD-10-CM

## 2020-01-15 DIAGNOSIS — D63.1 ANEMIA IN STAGE 3 CHRONIC KIDNEY DISEASE (HCC): Primary | ICD-10-CM

## 2020-01-15 DIAGNOSIS — N18.30 ANEMIA IN STAGE 3 CHRONIC KIDNEY DISEASE (HCC): Primary | ICD-10-CM

## 2020-01-17 ENCOUNTER — APPOINTMENT (OUTPATIENT)
Dept: HEMATOLOGY/ONCOLOGY | Facility: HOSPITAL | Age: 80
End: 2020-01-17
Attending: INTERNAL MEDICINE
Payer: MEDICARE

## 2020-01-17 ENCOUNTER — TELEPHONE (OUTPATIENT)
Dept: INTERNAL MEDICINE CLINIC | Facility: CLINIC | Age: 80
End: 2020-01-17

## 2020-01-17 DIAGNOSIS — Z79.01 ANTICOAGULATED ON COUMADIN: ICD-10-CM

## 2020-01-17 RX ORDER — ATORVASTATIN CALCIUM 20 MG/1
TABLET, FILM COATED ORAL
Qty: 30 TABLET | Refills: 0 | Status: SHIPPED | OUTPATIENT
Start: 2020-01-17

## 2020-01-17 NOTE — TELEPHONE ENCOUNTER
Daughter wants [employer] to call her as she wants to discuss the patient & [employer] & daughter do that relatively often. (Would not give any other info).       Call Liseth at:  [phone]

## 2020-01-17 NOTE — TELEPHONE ENCOUNTER
Spoke to [employer]; Pt could not make it in today due to fatigue; No signs of bleeding or change in pallor;    We will continue the same dose  The family will be looking to get him settled in [facility] in [place] soon;   Dr.B VILLA

## 2020-01-29 ENCOUNTER — PATIENT OUTREACH (OUTPATIENT)
Dept: CASE MANAGEMENT | Age: 80
End: 2020-01-29

## 2020-01-30 ENCOUNTER — APPOINTMENT (OUTPATIENT)
Dept: CT IMAGING | Facility: HOSPITAL | Age: 80
End: 2020-01-30
Attending: EMERGENCY MEDICINE
Payer: MEDICARE

## 2020-01-30 ENCOUNTER — HOSPITAL ENCOUNTER (OUTPATIENT)
Facility: HOSPITAL | Age: 80
Setting detail: OBSERVATION
Discharge: NURSING, REHAB OR LONG-TERM CARE | End: 2020-02-05
Attending: EMERGENCY MEDICINE
Payer: MEDICARE

## 2020-01-30 ENCOUNTER — APPOINTMENT (OUTPATIENT)
Dept: GENERAL RADIOLOGY | Facility: HOSPITAL | Age: 80
End: 2020-01-30
Attending: HOSPITALIST
Payer: MEDICARE

## 2020-01-30 ENCOUNTER — APPOINTMENT (OUTPATIENT)
Dept: GENERAL RADIOLOGY | Facility: HOSPITAL | Age: 80
End: 2020-01-30
Attending: EMERGENCY MEDICINE
Payer: MEDICARE

## 2020-01-30 DIAGNOSIS — R53.1 GENERALIZED WEAKNESS: Primary | ICD-10-CM
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
## 2020-01-30 DIAGNOSIS — R55 SYNCOPE AND COLLAPSE: ICD-10-CM

## 2020-01-30 PROCEDURE — 72110 X-RAY EXAM L-2 SPINE 4/>VWS: CPT | Performed by: HOSPITALIST

## 2020-01-30 PROCEDURE — 99214 OFFICE O/P EST MOD 30 MIN: CPT | Performed by: INTERNAL MEDICINE

## 2020-01-30 PROCEDURE — 72072 X-RAY EXAM THORAC SPINE 3VWS: CPT | Performed by: HOSPITALIST

## 2020-01-30 PROCEDURE — 72125 CT NECK SPINE W/O DYE: CPT | Performed by: EMERGENCY MEDICINE

## 2020-01-30 PROCEDURE — 70450 CT HEAD/BRAIN W/O DYE: CPT | Performed by: EMERGENCY MEDICINE

## 2020-01-30 PROCEDURE — 93289 INTERROG DEVICE EVAL HEART: CPT | Performed by: NURSE PRACTITIONER

## 2020-01-30 PROCEDURE — 99220 INITIAL OBSERVATION CARE,LEVL III: CPT | Performed by: HOSPITALIST

## 2020-01-30 PROCEDURE — 71045 X-RAY EXAM CHEST 1 VIEW: CPT | Performed by: EMERGENCY MEDICINE

## 2020-01-30 RX ORDER — PREDNISONE 2.5 MG
2.5 TABLET ORAL
Status: DISCONTINUED | OUTPATIENT
Start: 2020-01-31 | End: 2020-02-05

## 2020-01-30 RX ORDER — WARFARIN SODIUM 2 MG/1
2 TABLET ORAL NIGHTLY
Status: DISCONTINUED | OUTPATIENT
Start: 2020-01-30 | End: 2020-02-01

## 2020-01-30 RX ORDER — CARVEDILOL 25 MG/1
25 TABLET ORAL 2 TIMES DAILY WITH MEALS
Status: DISCONTINUED | OUTPATIENT
Start: 2020-01-30 | End: 2020-02-05

## 2020-01-30 RX ORDER — FOLIC ACID 1 MG/1
1 TABLET ORAL
Status: DISCONTINUED | OUTPATIENT
Start: 2020-01-30 | End: 2020-02-05

## 2020-01-30 RX ORDER — SODIUM CHLORIDE 0.9 % (FLUSH) 0.9 %
3 SYRINGE (ML) INJECTION AS NEEDED
Status: DISCONTINUED | OUTPATIENT
Start: 2020-01-30 | End: 2020-02-05

## 2020-01-30 RX ORDER — SODIUM PHOSPHATE, DIBASIC AND SODIUM PHOSPHATE, MONOBASIC 7; 19 G/133ML; G/133ML
1 ENEMA RECTAL ONCE AS NEEDED
Status: DISCONTINUED | OUTPATIENT
Start: 2020-01-30 | End: 2020-02-05

## 2020-01-30 RX ORDER — ONDANSETRON 2 MG/ML
4 INJECTION INTRAMUSCULAR; INTRAVENOUS EVERY 6 HOURS PRN
Status: DISCONTINUED | OUTPATIENT
Start: 2020-01-30 | End: 2020-02-05

## 2020-01-30 RX ORDER — DOCUSATE SODIUM 100 MG/1
100 CAPSULE, LIQUID FILLED ORAL 2 TIMES DAILY
Status: DISCONTINUED | OUTPATIENT
Start: 2020-01-30 | End: 2020-02-05

## 2020-01-30 RX ORDER — HYDRALAZINE HYDROCHLORIDE 25 MG/1
25 TABLET, FILM COATED ORAL 2 TIMES DAILY
Status: DISCONTINUED | OUTPATIENT
Start: 2020-01-30 | End: 2020-02-05

## 2020-01-30 RX ORDER — ACETAMINOPHEN 325 MG/1
650 TABLET ORAL EVERY 4 HOURS PRN
Status: DISCONTINUED | OUTPATIENT
Start: 2020-01-30 | End: 2020-02-05

## 2020-01-30 RX ORDER — HEPARIN SODIUM 5000 [USP'U]/ML
5000 INJECTION, SOLUTION INTRAVENOUS; SUBCUTANEOUS EVERY 8 HOURS SCHEDULED
Status: DISCONTINUED | OUTPATIENT
Start: 2020-01-30 | End: 2020-01-30

## 2020-01-30 RX ORDER — BISACODYL 10 MG
10 SUPPOSITORY, RECTAL RECTAL
Status: DISCONTINUED | OUTPATIENT
Start: 2020-01-30 | End: 2020-02-05

## 2020-01-30 RX ORDER — HYDROCODONE BITARTRATE AND ACETAMINOPHEN 5; 325 MG/1; MG/1
2 TABLET ORAL EVERY 4 HOURS PRN
Status: DISCONTINUED | OUTPATIENT
Start: 2020-01-30 | End: 2020-02-05

## 2020-01-30 RX ORDER — FUROSEMIDE 10 MG/ML
20 INJECTION INTRAMUSCULAR; INTRAVENOUS
Status: DISPENSED | OUTPATIENT
Start: 2020-01-30 | End: 2020-02-01

## 2020-01-30 RX ORDER — HYDROCODONE BITARTRATE AND ACETAMINOPHEN 5; 325 MG/1; MG/1
1 TABLET ORAL EVERY 4 HOURS PRN
Status: DISCONTINUED | OUTPATIENT
Start: 2020-01-30 | End: 2020-02-05

## 2020-01-30 RX ORDER — SPIRONOLACTONE 25 MG/1
25 TABLET ORAL DAILY
Status: DISCONTINUED | OUTPATIENT
Start: 2020-01-30 | End: 2020-02-05

## 2020-01-30 RX ORDER — DEXTROSE AND SODIUM CHLORIDE 5; .45 G/100ML; G/100ML
INJECTION, SOLUTION INTRAVENOUS CONTINUOUS
Status: DISCONTINUED | OUTPATIENT
Start: 2020-01-30 | End: 2020-01-31

## 2020-01-30 RX ORDER — ISOSORBIDE MONONITRATE 60 MG/1
60 TABLET, EXTENDED RELEASE ORAL DAILY
Status: DISCONTINUED | OUTPATIENT
Start: 2020-01-30 | End: 2020-02-05

## 2020-01-30 RX ORDER — NITROGLYCERIN 0.4 MG/1
0.4 TABLET SUBLINGUAL EVERY 5 MIN PRN
Status: DISCONTINUED | OUTPATIENT
Start: 2020-01-30 | End: 2020-02-05

## 2020-01-30 RX ORDER — FINASTERIDE 5 MG/1
5 TABLET, FILM COATED ORAL DAILY
Status: DISCONTINUED | OUTPATIENT
Start: 2020-01-30 | End: 2020-02-05

## 2020-01-30 RX ORDER — FLUOXETINE HYDROCHLORIDE 20 MG/1
20 CAPSULE ORAL DAILY
Status: DISCONTINUED | OUTPATIENT
Start: 2020-01-30 | End: 2020-02-05

## 2020-01-30 RX ORDER — ACETAMINOPHEN 325 MG/1
650 TABLET ORAL EVERY 6 HOURS PRN
Status: DISCONTINUED | OUTPATIENT
Start: 2020-01-30 | End: 2020-02-05

## 2020-01-30 RX ORDER — METOCLOPRAMIDE HYDROCHLORIDE 5 MG/ML
5 INJECTION INTRAMUSCULAR; INTRAVENOUS EVERY 8 HOURS PRN
Status: DISCONTINUED | OUTPATIENT
Start: 2020-01-30 | End: 2020-02-05

## 2020-01-30 RX ORDER — POLYETHYLENE GLYCOL 3350 17 G/17G
17 POWDER, FOR SOLUTION ORAL DAILY PRN
Status: DISCONTINUED | OUTPATIENT
Start: 2020-01-30 | End: 2020-02-05

## 2020-01-30 NOTE — CONSULTS
[facility] HOSP - [facility]    Report of Cardiology Consultation    Terrell Veloz Patient Status:  Emergency    1940 MRN [id number]   Location [address] Attending Ramses Cerda   Hosp Day # 0 PCP Heri Lyn edema.    12/2019 ICD check shows 100% A. fib he is programmed VVIR 70 he is dependent with heart block and by V paced 100% of the time no ventricular arrhythmias and 1.5 years of battery left.     6/2018 echo = ef 25%, dd, lae    Past Medical History  Past systems were reviewed are negative  Physical Exam:   Blood pressure 147/65, pulse 81, temperature 98.2 °F (36.8 °C), temperature source Oral, resp. rate 18, weight 160 lb (72.6 kg), SpO2 97 %.     Scheduled Meds:       Physical Exam:    General: Alert and o

## 2020-01-30 NOTE — ICD/PM
- [employer] BiV ICD checked  - Chronic Afib  - no events, no ventricluar events  - VVIR  - 16 mo left on battery      MARGUERITE Jeffrey  S Cardiology  01/30/20

## 2020-01-30 NOTE — H&P
[facility]    PATIENT'S NAME: Gabby Rodriges   ATTENDING PHYSICIAN: Patricia Cerda MD   PATIENT ACCOUNT#:   [id number]    LOCATION:  [place] [address]  MEDICAL RECORD #:   [id number]       YOB: 1940  ADMISSION DATE:       01 occasionally drinks beer. He is rather noncompliant with diet. REVIEW OF SYSTEMS:  He denies any shortness of breath. He reports back pain right now. No dizziness. No significant headache. No difficulty swallowing. He has a poor appetite.   He is kidney disease stage 3, appears stable. Dictated By Erick Cerda MD  d: 01/30/2020 14:52:05  t: 01/30/2020 15:17:37  [facility] [id number]  LAS/

## 2020-01-30 NOTE — ED INITIAL ASSESSMENT (HPI)
Fall between 6pm-9am.  Patient states this morning. Family found patient sitting in the bathtub. Patient is acting according to baseline.   On coumadin

## 2020-01-30 NOTE — ED PROVIDER NOTES
Ct Brain Or Head ([address])    Result Date: 1/30/2020  CONCLUSION:  Mild chronic microvascular ischemic disease without acute intracranial abnormality.    Dictated by (CST): Clayton Lambert MD on 1/30/2020 at 15:24     Approved by (CST): Clayton Lambert MD on 1/30/2

## 2020-01-30 NOTE — ED PROVIDER NOTES
Patient Seen in: [facility] AND [facility] Emergency Department      History   Patient presents with:  Fall    Stated Complaint: fall    HPI    25-year-old male with history of dementia presents for complaint of a fall.   Patient has a daily caregiver who lives a Temp src Oral   SpO2 97 %   O2 Device None (Room air)       Current:/65   Pulse 81   Temp 98.2 °F (36.8 °C) (Oral)   Resp 18   Wt 72.6 kg   SpO2 97%   BMI 24.33 kg/m²         Physical Exam  Vitals signs and nursing note reviewed.    Constitutional: Tenderness: There is no tenderness. There is no guarding or rebound. Negative signs include Da Silva's sign and McBurney's sign. Hernia: A hernia is present. Hernia is present in the ventral area (soft, non-tender, reducible).       Comments: No ecchy PLT 86.0 (*)     Lymphocyte Absolute 0.26 (*)     All other components within normal limits   TROPONIN I - Normal   CK CREATINE KINASE (NOT CREATININE) - Normal   MAGNESIUM - Normal   POCT GLUCOSE - Normal   CBC WITH DIFFERENTIAL WITH PLATELET    Wandra Stall CONCLUSION:  1. Mild CHF with mild pulmonary interstitial edema and right basilar pleural effusion.     Dictated by (CST): Magda Temple MD on 1/30/2020 at 13:37     Approved by (CST): Madiha Luu MD on 1/30/2020 at 13:39

## 2020-01-31 ENCOUNTER — APPOINTMENT (OUTPATIENT)
Dept: GENERAL RADIOLOGY | Facility: HOSPITAL | Age: 80
End: 2020-01-31
Attending: HOSPITALIST
Payer: MEDICARE

## 2020-01-31 ENCOUNTER — APPOINTMENT (OUTPATIENT)
Dept: ULTRASOUND IMAGING | Facility: HOSPITAL | Age: 80
End: 2020-01-31
Attending: HOSPITALIST
Payer: MEDICARE

## 2020-01-31 ENCOUNTER — APPOINTMENT (OUTPATIENT)
Dept: CV DIAGNOSTICS | Facility: HOSPITAL | Age: 80
End: 2020-01-31
Attending: HOSPITALIST
Payer: MEDICARE

## 2020-01-31 PROBLEM — E46 MALNUTRITION (HCC): Status: ACTIVE | Noted: 2020-01-31

## 2020-01-31 PROCEDURE — 71045 X-RAY EXAM CHEST 1 VIEW: CPT | Performed by: HOSPITALIST

## 2020-01-31 PROCEDURE — 99226 SUBSEQUENT OBSERVATION CARE: CPT | Performed by: HOSPITALIST

## 2020-01-31 PROCEDURE — 74230 X-RAY XM SWLNG FUNCJ C+: CPT | Performed by: HOSPITALIST

## 2020-01-31 PROCEDURE — 93306 TTE W/DOPPLER COMPLETE: CPT | Performed by: HOSPITALIST

## 2020-01-31 PROCEDURE — 99214 OFFICE O/P EST MOD 30 MIN: CPT | Performed by: INTERNAL MEDICINE

## 2020-01-31 PROCEDURE — 76775 US EXAM ABDO BACK WALL LIM: CPT | Performed by: HOSPITALIST

## 2020-01-31 RX ORDER — IPRATROPIUM BROMIDE AND ALBUTEROL SULFATE 2.5; .5 MG/3ML; MG/3ML
3 SOLUTION RESPIRATORY (INHALATION) ONCE
Status: COMPLETED | OUTPATIENT
Start: 2020-01-31 | End: 2020-01-31

## 2020-01-31 RX ORDER — POTASSIUM CHLORIDE 20 MEQ/1
40 TABLET, EXTENDED RELEASE ORAL EVERY 4 HOURS
Status: COMPLETED | OUTPATIENT
Start: 2020-01-31 | End: 2020-01-31

## 2020-01-31 NOTE — PROGRESS NOTES
[facility] Pharmacy Note:  Renal Dose Adjustment for Metoclopramide (REGLAN)    Kavitha Bryan has been prescribed Metoclopramide (REGLAN) 10 mg every 8 hours as needed for nausea.     Estimated Creatinine Clearance: 36.9 mL/min (A) (based on SCr of 1.52 mg/dL (H

## 2020-01-31 NOTE — SLP NOTE
ADULT SWALLOWING EVALUATION    ASSESSMENT    ASSESSMENT/OVERALL IMPRESSION:  SLP BSSE orders received and acknowledged. A swallow evaluation warranted as pt at risk of aspiration. Pt with breathy vocal quality, on 3L/Min, with oxygen saturation at 97.  Pt w Undetermined    HISTORY   MEDICAL HISTORY  Reason for Referral: R/O aspiration    Problem List  Principal Problem:    Generalized weakness  Active Problems:    Fall, initial encounter    Syncope and collapse      Past Medical History  Past Medical History: Within Functional Limits  Bolus Retrieval: Intact  Bilabial Seal: Intact  Bolus Formation: Intact  Bolus Propulsion: Intact  Mastication: Intact  Retention: Intact    Pharyngeal Phase of Swallow: Impaired  Laryngeal Elevation Timing: Appears intact  Laryng

## 2020-01-31 NOTE — PLAN OF CARE
Problem: Patient/Family Goals  Goal: Patient/Family Long Term Goal  Description  Patient's Long Term Goal: To return to baseline health     Interventions:  - Take medications as prescribed  - Follow plan of care  - Follow up with appropriate recommendati stability  Description  INTERVENTIONS:  - Monitor vital signs, rhythm, and trends  - Monitor for bleeding, hypotension and signs of decreased cardiac output  - Evaluate effectiveness of vasoactive medications to optimize hemodynamic stability  - Monitor ar

## 2020-01-31 NOTE — PROGRESS NOTES
[facility]    Progress Note    Ekaterina Torres Patient Status:  Observation    1940 MRN [id number]   Location [facility] 3W/SW Attending Adam Greene MD   Hosp Day # 0 PCP Richard Yuen MD         Assessment and Pl Daily with breakfast   • spironolactone  25 mg Oral Daily   • Warfarin Sodium  2 mg Oral Nightly   • docusate sodium  100 mg Oral BID   • furosemide  20 mg Intravenous BID (Diuretic)             Results:     Lab Results   Component Value Date    WBC 7.7 01 Approved by (CST): Nava Serrano MD on 1/30/2020 at 15:26          Ct Spine Cervical (cpt=72125)    Result Date: 1/30/2020  CONCLUSION:   Degenerative disc, facet, uncovertebral joint disease throughout the cervical spine without acute fracture or dislocati

## 2020-01-31 NOTE — OCCUPATIONAL THERAPY NOTE
OCCUPATIONAL THERAPY EVALUATION - INPATIENT     Room Number: 333/333-A  Evaluation Date: 1/31/2020  Type of Evaluation: Initial  Presenting Problem: (generalized weakness)    Physician Order: IP Consult to Occupational Therapy  Reason for Therapy: ADL/IADL RECOMMENDATIONS  OT Discharge Recommendations: 24 hour care/supervision;Sub-acute rehabilitation  OT Device Recommendations: TBD    PLAN  OT Treatment Plan: Balance activities; Energy conservation/work simplification techniques;ADL training;Functional trans Status:  Impaired  Orientation Level:  oriented to situation and oriented to person  Memory:  decreased recall of biographical information and decreased long term memory  Following Commands:  follows one step commands with increased time and follows one st fair-    FUNCTIONAL ADL ASSESSMENT  Grooming:set up   Feeding: set up   Bathing: max a   Toileting: mod a   Upper Extremity Dressing: min a   Lower Extremity Dressing: max a     Education Provided: Educated pt on role of OT in acute care setting, physiolog

## 2020-01-31 NOTE — PHYSICAL THERAPY NOTE
PHYSICAL THERAPY EVALUATION - INPATIENT     Room Number: 333/333-A  Evaluation Date: 1/31/2020  Type of Evaluation: Initial   Physician Order: PT Eval and Treat    Presenting Problem: (fall at home unknown cause, found by family)  Reason for Therapy: Chitra • OTHER SURGICAL HISTORY      ICD   • OTHER SURGICAL HISTORY  August 2016    cystoscopy, bladder washings for cytology   • PACEMAKER/DEFIBRILLATOR     • TONSILLECTOMY         HOME SITUATION  Type of Home: Condo   Home Layout: One level                Mandy Minimum assistance     Assistive Device: Rolling walker  Pattern: Shuffle          Bed Mobility: min A    Transfers: Min A    Exercise/Education Provided:  Bed mobility  Gait training  Transfer training    Patient End of Session: Up in chair    CURRENT GOA

## 2020-01-31 NOTE — SLP NOTE
ADULT VIDEOFLUOROSCOPIC SWALLOWING STUDY    Admission Date: 1/30/2020  Evaluation Date: 01/31/20  Radiologist: Dr. Bacon Lightning: Regular  Diet Recommendations - Liquid:  Thin (no straw)      PLAN: Recommend re effusion.     CT Brain 1/30:     =====  CONCLUSION:     Mild chronic microvascular ischemic disease without acute intracranial abnormality.     Reason for Referral: R/O aspiration  Pt with delayed cough on thin liquids at bedside which may be caused from [facility] None  Cough/Throat Clear Response: No  Strategy(ies) Implemented (Nectar Thick): Slow rate; Alternate consistencies;Small bites and sips; No straws  Effectiveness: Yes     PUREE  Oral Phase of Swallow (VFSS - Puree):  Impaired  Bolus Retrieval (VFSS - Puree): recommendations. FCM Score: 6   FCM Impression: Abnormal     GOALS  Goal #1 The patient will tolerate regular consistency and thin liquids without overt signs or symptoms of aspiration with 100 % accuracy over 1-2 session(s).   In Progress   Goal #2 The

## 2020-01-31 NOTE — DIETARY NOTE
ADULT NUTRITION INITIAL ASSESSMENT    Pt is at moderate nutrition risk. Pt meets moderate malnutrition criteria.      CRITERIA FOR MALNUTRITION DIAGNOSIS:  Criteria for non-severe malnutrition diagnosis: acute illness/injury related to wt loss 7.5% in 3 mo insufficiency        ANTHROPOMETRICS:  HT:  5'8\"  WT: 66.7 kg (147 lb)   BMI: Body mass index is 22.35 kg/m².   BMI CLASSIFICATION: 19-24.9 kg/m2 - WNL  IBW: 154 lbs        95% IBW  Usual Body Wt: 160s lbs       92% UBW    WEIGHT HISTORY:  Patient Weight(s visual exam.    - Fluid Accumulation: none per visual exam    - Skin Integrity: right stage II pressure ulcer buttocks.   - Terrell score 16    NUTRITION PRESCRIPTION:  Diet: Cardiac  Oral Supplements: Added Chocolate Ensure Enlive BID  ESTIMATED NUTRITION N

## 2020-01-31 NOTE — PLAN OF CARE
Problem: SAFETY ADULT - FALL  Goal: Free from fall injury  Description  INTERVENTIONS:  - Assess pt frequently for physical needs  - Identify cognitive and physical deficits and behaviors that affect risk of falls.   - [place] fall precautions as indica

## 2020-02-01 PROCEDURE — 99214 OFFICE O/P EST MOD 30 MIN: CPT | Performed by: INTERNAL MEDICINE

## 2020-02-01 PROCEDURE — 99226 SUBSEQUENT OBSERVATION CARE: CPT | Performed by: HOSPITALIST

## 2020-02-01 RX ORDER — IPRATROPIUM BROMIDE AND ALBUTEROL SULFATE 2.5; .5 MG/3ML; MG/3ML
3 SOLUTION RESPIRATORY (INHALATION) EVERY 6 HOURS PRN
Status: DISCONTINUED | OUTPATIENT
Start: 2020-02-01 | End: 2020-02-05

## 2020-02-01 RX ORDER — IPRATROPIUM BROMIDE AND ALBUTEROL SULFATE 2.5; .5 MG/3ML; MG/3ML
3 SOLUTION RESPIRATORY (INHALATION) 2 TIMES DAILY
Status: DISCONTINUED | OUTPATIENT
Start: 2020-02-01 | End: 2020-02-04

## 2020-02-01 RX ORDER — FUROSEMIDE 10 MG/ML
20 INJECTION INTRAMUSCULAR; INTRAVENOUS ONCE
Status: COMPLETED | OUTPATIENT
Start: 2020-02-01 | End: 2020-02-01

## 2020-02-01 NOTE — CONSULTS
[facility] HOSP - [facility]    Consult Note    Date:  2/1/2020  Date of Admission:  1/30/2020      Chief Complaint:   Aaron Lundy is a(n) 78year old male with pleural effusion.     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 No weight loss no weight gain. Physical Exam:   Vital Signs:  Blood pressure 132/79, pulse 70, temperature 97.8 °F (36.6 °C), temperature source Oral, resp. rate 20, weight 151 lb 14.4 oz (68.9 kg), SpO2 93 %.      Alert DNR    I am delighted to assist with [facility] care.         Garo Perez MD  Medical Director, Critical Care, [address] [place] [address] Director, [address] [place] [address]. 187 R  Pager: [phone]

## 2020-02-01 NOTE — CM/SW NOTE
01/31/20 1800   CM/SW Screening   Referral Source Physician   Information Source Nursing rounds   Patient's Mental Status Alert;Oriented   Patient's Home Environment Condo/Apt with elevator   Number of Levels in Home 1   Patient lives with Spouse   Chanel

## 2020-02-01 NOTE — CM/SW NOTE
2/1: Received call from [facility] w/ UR, patient does not qualify for inpatient and changed back to observation. Patient will not qualify for SNF stay under Medicare guidelines, it would be private pay. Patient is confused. L/m for patient's daughter Isadora Ruiz.

## 2020-02-01 NOTE — PLAN OF CARE
Problem: Patient/Family Goals  Goal: Patient/Family Long Term Goal  Description  Patient's Long Term Goal: To return to baseline health     Interventions:  - Take medications as prescribed  - Follow plan of care  - Follow up with appropriate recommendati stability  Description  INTERVENTIONS:  - Monitor vital signs, rhythm, and trends  - Monitor for bleeding, hypotension and signs of decreased cardiac output  - Evaluate effectiveness of vasoactive medications to optimize hemodynamic stability  - Monitor ar help with wound healing.

## 2020-02-01 NOTE — PROGRESS NOTES
[facility]    Progress Note    [place] Manuel Patient Status:  Inpatient    1940 MRN [id number]   Location [facility] 3W/SW Attending Roosvelt Habermann, MD   Hosp Day # 1 PCP Amado Hemphill MD       Subjective:    Hemanth Fields sodium  100 mg Oral BID       Current PRN Inpatient Meds:      ipratropium-albuterol, Normal Saline Flush, Atropine Sulfate, nitroGLYCERIN, acetaminophen, acetaminophen **OR** HYDROcodone-acetaminophen **OR** HYDROcodone-acetaminophen, ondansetron HCl, Met Thoracic Spine (3 Views) (cpt=72072)    Result Date: 1/30/2020  CONCLUSION:  1. Mild levoscoliosis and multilevel thoracic spondylosis. 2. Mild chronic wedge compression midthoracic vertebra unchanged.  3. Developing right-sided effusion and right basilar a interstitial edema and right basilar pleural effusion. Dictated by (CST): Natalie López MD on 1/30/2020 at 13:37     Approved by (CST): Ash Luu MD on 1/30/2020 at 13:39                 Assessment and Plan:   1.        Status pos

## 2020-02-01 NOTE — PROGRESS NOTES
[facility]    Progress Note    Raquel Pardo Patient Status:  Inpatient    1940 MRN [id number]   Location [facility] 3W/SW Attending Thuan Piedra MD   Hosp Day # 0 PCP Domo James MD       Subjective:    Josh Pod furosemide  20 mg Intravenous BID (Diuretic)       Current PRN Inpatient Meds:      Normal Saline Flush, Atropine Sulfate, nitroGLYCERIN, acetaminophen, acetaminophen **OR** HYDROcodone-acetaminophen **OR** HYDROcodone-acetaminophen, ondansetron HCl, Metoc compression midthoracic vertebra unchanged. 3. Developing right-sided effusion and right basilar airspace opacification.     Dictated by (CST): Phillip Lewis MD on 1/30/2020 at 23:34     Approved by (CST): Phillip Lewis MD on 1/30/2020 at 23:37 Georgina Luu MD on 1/30/2020 at 13:39             Ekg 12-lead    Result Date: 1/30/2020  ECG Report  Interpretation  -------------------------- Electronic ventricular pacemaker ABNORMAL When compared with ECG of 01/15/2019 17:44:35 No significan

## 2020-02-02 ENCOUNTER — PATIENT MESSAGE (OUTPATIENT)
Dept: INTERNAL MEDICINE CLINIC | Facility: CLINIC | Age: 80
End: 2020-02-02

## 2020-02-02 ENCOUNTER — APPOINTMENT (OUTPATIENT)
Dept: ULTRASOUND IMAGING | Facility: HOSPITAL | Age: 80
End: 2020-02-02
Attending: INTERNAL MEDICINE
Payer: MEDICARE

## 2020-02-02 ENCOUNTER — APPOINTMENT (OUTPATIENT)
Dept: GENERAL RADIOLOGY | Facility: HOSPITAL | Age: 80
End: 2020-02-02
Attending: INTERNAL MEDICINE
Payer: MEDICARE

## 2020-02-02 PROCEDURE — 99226 SUBSEQUENT OBSERVATION CARE: CPT | Performed by: HOSPITALIST

## 2020-02-02 PROCEDURE — 32555 ASPIRATE PLEURA W/ IMAGING: CPT | Performed by: INTERNAL MEDICINE

## 2020-02-02 PROCEDURE — 71045 X-RAY EXAM CHEST 1 VIEW: CPT | Performed by: INTERNAL MEDICINE

## 2020-02-02 PROCEDURE — 0W993ZZ DRAINAGE OF RIGHT PLEURAL CAVITY, PERCUTANEOUS APPROACH: ICD-10-PCS | Performed by: INTERNAL MEDICINE

## 2020-02-02 RX ORDER — WARFARIN SODIUM 2 MG/1
2 TABLET ORAL
Status: COMPLETED | OUTPATIENT
Start: 2020-02-02 | End: 2020-02-02

## 2020-02-02 NOTE — PLAN OF CARE
Pt is pleasantly confused with family at bedside. S/p right thoracentesis 1500cc removed. Specimens delivered to lab. [facility] for pain post thoracentesis. Bed rest maintained. Site stable with minimal drainage. Chest x ray completed.  TB test added on fo deficits and behaviors that affect risk of falls.   - [place] fall precautions as indicated by assessment.  - Educate pt/family on patient safety including physical limitations  - Instruct pt to call for assistance with activity based on assessment  - Mod request assistance  - Assess pain using appropriate pain scale  - Administer analgesics based on type and severity of pain and evaluate response  - Implement non-pharmacological measures as appropriate and evaluate response  - Consider cultural and social

## 2020-02-02 NOTE — PROGRESS NOTES
[facility]    Progress Note    Jessica Funes Patient Status:  Inpatient    1940 MRN [id number]   Location [facility] 3W/SW Attending Magalys Pal MD   Hosp Day # 0 PCP Julisa Verma MD       Subjective:    Ric Mathur sodium  100 mg Oral BID       Current PRN Inpatient Meds:      ipratropium-albuterol, Normal Saline Flush, Atropine Sulfate, nitroGLYCERIN, acetaminophen, acetaminophen **OR** HYDROcodone-acetaminophen **OR** HYDROcodone-acetaminophen, ondansetron HCl, Met Thoracic Spine (3 Views) (cpt=72072)    Result Date: 1/30/2020  CONCLUSION:  1. Mild levoscoliosis and multilevel thoracic spondylosis. 2. Mild chronic wedge compression midthoracic vertebra unchanged.  3. Developing right-sided effusion and right basilar a interstitial edema and right basilar pleural effusion. Dictated by (CST): Phyllis Madrid MD on 1/30/2020 at 13:37     Approved by (CST): Sharad Luu MD on 1/30/2020 at 13:39                 Assessment and Plan:   1.        Status pos

## 2020-02-02 NOTE — PROCEDURES
[facility]  Procedure Note  20    Dominique Edouard Patient Status:  Observation    1940 MRN [id number]   Location [facility] 3W/SW Attending Rhina Ndiaye MD   Hosp Day # 0 PCP Louie Lopez MD     Procedure: Right-maria del carmen

## 2020-02-03 ENCOUNTER — TELEPHONE (OUTPATIENT)
Dept: INTERNAL MEDICINE CLINIC | Facility: CLINIC | Age: 80
End: 2020-02-03

## 2020-02-03 ENCOUNTER — PATIENT MESSAGE (OUTPATIENT)
Dept: INTERNAL MEDICINE CLINIC | Facility: CLINIC | Age: 80
End: 2020-02-03

## 2020-02-03 PROCEDURE — 99226 SUBSEQUENT OBSERVATION CARE: CPT | Performed by: NURSE PRACTITIONER

## 2020-02-03 PROCEDURE — 99214 OFFICE O/P EST MOD 30 MIN: CPT | Performed by: INTERNAL MEDICINE

## 2020-02-03 RX ORDER — WARFARIN SODIUM 2 MG/1
2 TABLET ORAL NIGHTLY
Status: COMPLETED | OUTPATIENT
Start: 2020-02-03 | End: 2020-02-03

## 2020-02-03 NOTE — CM/SW NOTE
0672 Addendum 2/3/2020  Faxed Clinicals to Located within [facility] in [facility]. Attention Hafsa Ramila: DON  Phone [phone], Fx [phone]  Nurse to Nurse report to be called to   [phone] on discharge.       Case Management/ Progression of Care    Per

## 2020-02-03 NOTE — PROGRESS NOTES
[facility]    Progress Note      Assessment and Plan:   1. Moderate right pleural effusion– status post evacuation of 1.5 L of transudate. Patient is breathing comfortably. Small residual effusion remains.   Pleural fluid cytology is jennifer

## 2020-02-03 NOTE — PROGRESS NOTES
[facility] - [facility]    Progress Note    Zia Presume Patient Status:  Inpatient    1940 MRN [id number]   Location [facility] 3W/SW Attending May Valles MD   Hosp Day # 0 PCP Elaine Smith MD       Subjective:    Rocio Handley Daily with breakfast   • spironolactone  25 mg Oral Daily   • docusate sodium  100 mg Oral BID       Current PRN Inpatient Meds:      ipratropium-albuterol, Normal Saline Flush, Atropine Sulfate, nitroGLYCERIN, acetaminophen, acetaminophen **OR** HYDROcodo This is a cytocentrifuged smear. N/A    Body Fld Smear 3+ WBCs seen N/A    Body Fld Smear No organisms seen N/A       Imaging/EKG:   Xr Lumbar Spine (min 4 Views) (cpt=72110)    Result Date: 1/30/2020  CONCLUSION:  1.  Dextroscoliosis and multilevel lumbar effusion. No definite pneumothorax. No other significant interval change.     Dictated by (CST): José Miguel Bowman MD on 2/02/2020 at 16:10     Approved by (CST): José Miguel Bowman MD on 2/02/2020 at 16:11          Xr Chest Ap Portable  (cpt=71045)    Res mepilex     DVT prophylaxis: coumadin  Code: DNR  Dispo: pending, quantiferon gold pending, physician to complete assessment (in paper chart), plan for d/c to [facility] hopefully tomorrow.  Daniel 173, APRN  2/3/2020

## 2020-02-03 NOTE — TELEPHONE ENCOUNTER
From: Lara Smart  To: Sam Brady MD  Sent: 2/3/2020 9:59 AM CST  Subject: Other    Dr. Fortino Goetz,    I think you can ignore my previous letter. The hospital agreed to do the blood test for TB. Thank you.

## 2020-02-03 NOTE — PLAN OF CARE
Problem: Patient/Family Goals  Goal: Patient/Family Long Term Goal  Description  Patient's Long Term Goal: To return to baseline health     Interventions:  - Take medications as prescribed  - Follow plan of care  - Follow up with appropriate recommendati stability  Description  INTERVENTIONS:  - Monitor vital signs, rhythm, and trends  - Monitor for bleeding, hypotension and signs of decreased cardiac output  - Evaluate effectiveness of vasoactive medications to optimize hemodynamic stability  - Monitor ar minimize respiratory effort  - Oxygen supplementation based on oxygen saturation or ABGs  - Provide Smoking Cessation handout, if applicable  - Encourage broncho-pulmonary hygiene including cough, deep breathe, Incentive Spirometry  - Assess the need for s

## 2020-02-03 NOTE — TELEPHONE ENCOUNTER
Daughter, Ade Dunn, calling to speak with a nurse  Pt fell last Thursday and has gone to the ER. Pt was admitted and got his lungs tapped while there. Pt is being released tomorrow and going to [facility] in [facility].  Pt is in no shape to live on h

## 2020-02-03 NOTE — TELEPHONE ENCOUNTER
To Dr. Louis Velasquez---    Spoke to patient dtr (oker per hippa) who reports pt has fallen 5x now recently with the last time being Thursday when he was admitted into the hospital. Dtr reports patient had a thoracentesis with 1.5 L fluid removed yesterday.      Dtr rep

## 2020-02-03 NOTE — TELEPHONE ENCOUNTER
From: Arleen Puentes  To: Myles Spear MD  Sent: 2/2/2020 9:08 AM CST  Subject: Other    Hi Dr Heraclio Medrano,    My dad, Marcelle Huitron, fell at home this past Thursday.  He is at [facility] and unfortunately they have him as observation, not inpatien

## 2020-02-03 NOTE — CM/SW NOTE
Received MDO for POLST. No POLST available in [facility]. SW placed POLST on pt's chart - needs all signatures. SW/CM to remain available for support and/or discharge planning.        Pete Reynolds, [facility]

## 2020-02-03 NOTE — CM/SW NOTE
Case Management/ Progression of Care    Home Health Orders / lab orders and Face to Face are needed prior to discharge. SW/CM to remain available for support and/or discharge planning.          Amol Khalil RN Case Manager   Ext. [phone]

## 2020-02-03 NOTE — TELEPHONE ENCOUNTER
To Dr. Yomi Ng----    Received additional mychart:  \"Dr. Abdias Carmichael,    I think you can ignore my previous letter. [facility] agreed to do the blood test for TB.  Thank you. \"

## 2020-02-04 ENCOUNTER — TELEPHONE (OUTPATIENT)
Dept: INTERNAL MEDICINE CLINIC | Facility: CLINIC | Age: 80
End: 2020-02-04

## 2020-02-04 PROCEDURE — 99214 OFFICE O/P EST MOD 30 MIN: CPT | Performed by: INTERNAL MEDICINE

## 2020-02-04 PROCEDURE — 99217 OBSERVATION CARE DISCHARGE: CPT | Performed by: HOSPITALIST

## 2020-02-04 RX ORDER — WARFARIN SODIUM 2 MG/1
2 TABLET ORAL NIGHTLY
Status: DISCONTINUED | OUTPATIENT
Start: 2020-02-04 | End: 2020-02-05

## 2020-02-04 RX ORDER — FLUOXETINE 10 MG/1
20 CAPSULE ORAL DAILY
Qty: 90 CAPSULE | Refills: 3 | Status: SHIPPED | COMMUNITY
Start: 2020-02-04 | End: 2020-02-10

## 2020-02-04 NOTE — OCCUPATIONAL THERAPY NOTE
OT attempted treatment in collaboration with PT- pt in bed and politely declining- had just returned back to bed after being up in chair this afternoon; encouraged pt to be up in chair for all meals and up later if able. Patient is agreeable.  Will continue

## 2020-02-04 NOTE — CM/SW NOTE
Case Management/ Progression of Care    This CM faxed Physician Certification to [facility] from [facility].    Quantiferon TB test is pending, and confirmed with Laboratory results will be available 2/5 at 2pm.   Patient family and Deandre Oro updated on discha

## 2020-02-04 NOTE — TELEPHONE ENCOUNTER
Daughter Kathy Barrientos stopped by the office to drop off form, I informed her that we received fax. She states that she s/w  and they state that the hospital NP will complete form and the hospitalist will sign off.  However, she wants MD to hand onto f

## 2020-02-04 NOTE — SLP NOTE
SPEECH DAILY NOTE - INPATIENT    ASSESSMENT & PLAN   ASSESSMENT      Pt alert, afebrile and on room air. Pt seen for swallow analysis per VFSS recommendations (after consulting with RN).  Pt observed upright in bed with current diet of solid/thin liquids fo over 1-2 session(s). No CSA on any swallows (no coughing, no throat clearing, clear vocal quality) during this first session for 100% of the trials. RN reported good tolerance of current diet.         GOAL MET     Goal #2 The patient/family/caregiver krish

## 2020-02-04 NOTE — DISCHARGE SUMMARY
[facility]    Discharge Summary    Yue Lynch Patient Status:  Observation    1940 MRN [id number]   Location [facility] 3W/SW Attending Ranjan Sher MD   Hosp Day # 0 PCP Stone Martinez MD     Date of Admissi ANAEROBIC     Status: None (Preliminary result)    Collection Time: 02/02/20  2:34 PM   Result Value Ref Range    Body Fluid Culture Result No Growth 1 Day N/A    Body Fld Smear This is a cytocentrifuged smear.  N/A    Body Fld Smear 3+ WBCs seen N/A    Bod Kanwal Mendoza MD on 1/31/2020 at 13:27     Approved by (CST): Sabrina Weiner MD on 1/31/2020 at 13:27          Xr Chest Ap Portable  (cpt=71045)    Result Date: 2/2/2020  CONCLUSION: Decreased small right pleural effusion. No definite pneumothorax.   No other 27.5 08/02/2016    INR 1.74 (H) 02/04/2020    PT 49.6 (H) 09/29/2016    T4F 1.2 01/31/2020    TSH 10.800 (H) 01/31/2020    PSA 0.4 12/02/2017    ESRML 50 (H) 12/14/2019    CRP 2.05 (H) 12/14/2019    MG 2.0 01/31/2020    PHOS 3.3 01/30/2020    TROP <0.045 0 medications      Instructions Prescription details   carvedilol 25 MG Tabs  Commonly known as:  COREG      Take 1 tablet (25 mg total) by mouth 2 (two) times daily with meals.  take 1 tablet (25MG)  by oral route 2 times every day with food   Quantity:  1 t Consulting Physician  PULMONARY DISEASES    Emi Cancer, MD Consulting Physician  Cardiac Electrophysiology                Other Discharge Instructions:       Discharge Instructions       Home health pt/ot/rn  Blood draw: INR in 3-4 days, coumadin dosing p

## 2020-02-04 NOTE — TELEPHONE ENCOUNTER
Grant Proffer / Residential calling to confirm Dr Sai Mcmullen [facility]

## 2020-02-04 NOTE — TELEPHONE ENCOUNTER
Noted. Please notify daughter that I will attempt to complete the paperwork later this week once it is sent to me. I will route this to nursing to notify daughter that I will try to do that this week.

## 2020-02-04 NOTE — PHYSICAL THERAPY NOTE
Attempted physical therapy treatment. Patient visiting with family at bedside. Declined physical therapy participation states he had been in the chair all day and wanting to rest. Will defer physical therapy and re-attempt on 2/5.  Plan is for patient to [facility]

## 2020-02-04 NOTE — TELEPHONE ENCOUNTER
Called and s/w daughter Isadora Ruiz. She states that patient is expected to be discharged today from the hospital. Helga Franco will not except (admit) patient until form is completed by PCP.  I Informed daughter that MD is not in the office today, he will return martha

## 2020-02-04 NOTE — PROGRESS NOTES
[facility]     Progress Note        Rodri Carrera Patient Status:  Observation    1940 MRN [id number]   Location [facility] 3W/SW Attending Alexander Burch MD   Hosp Day # 0 PCP Jacobo Askew MD       Subjective: 5-325 MG per tab 2 tablet, 2 tablet, Oral, Q4H PRN  ondansetron HCl (ZOFRAN) injection 4 mg, 4 mg, Intravenous, Q6H PRN  Metoclopramide HCl (REGLAN) injection 5 mg, 5 mg, Intravenous, Q8H PRN  docusate sodium (COLACE) cap 100 mg, 100 mg, Oral, BID

## 2020-02-05 VITALS
RESPIRATION RATE: 16 BRPM | BODY MASS INDEX: 23 KG/M2 | HEART RATE: 70 BPM | OXYGEN SATURATION: 94 % | TEMPERATURE: 98 F | WEIGHT: 149.13 LBS | DIASTOLIC BLOOD PRESSURE: 80 MMHG | SYSTOLIC BLOOD PRESSURE: 139 MMHG

## 2020-02-05 PROCEDURE — 99225 SUBSEQUENT OBSERVATION CARE: CPT | Performed by: NURSE PRACTITIONER

## 2020-02-05 PROCEDURE — 99214 OFFICE O/P EST MOD 30 MIN: CPT | Performed by: INTERNAL MEDICINE

## 2020-02-05 NOTE — TELEPHONE ENCOUNTER
Patient is being released from [address] [place] [address] today  & cannot go to [facility] as new patient until form is completed  (pt cannot live on his own)    Per [address] [place] [address]    The form dropped off to Dr Noy Hutchison yesterday will need to be completed by Dr CHARLOTTE robison/today

## 2020-02-05 NOTE — TELEPHONE ENCOUNTER
I have completed the forms that were requested to be done by [facility]. I have completed the forms as requested. I have also attached a copy of the discharge summary from today's discharge.   I have also copied my last progress note from [facility]

## 2020-02-05 NOTE — HOME CARE LIAISON
Spoke with daughter Michael All, also met with patient at the bedside. Confirmed patient is agreeable to [facility]. Residential brochure provided with contact information. All questions addressed and answered.

## 2020-02-05 NOTE — TELEPHONE ENCOUNTER
MINERVA to Dr. Burns Mail----    Spoke to RN from [facility] and advised [phone] [place] Case for [facility] orders per MD protocol.

## 2020-02-05 NOTE — TELEPHONE ENCOUNTER
Bluford Duane called again to check status  Her dad is to be released at 2 pm today    Bluford Duane can  forms when completed  [phone]

## 2020-02-05 NOTE — PLAN OF CARE
Problem: Patient/Family Goals  Goal: Patient/Family Long Term Goal  Description  Patient's Long Term Goal: To return to baseline health     Interventions:  - Take medications as prescribed  - Follow plan of care  - Follow up with appropriate recommendati hemodynamic stability  Description  INTERVENTIONS:  - Monitor vital signs, rhythm, and trends  - Monitor for bleeding, hypotension and signs of decreased cardiac output  - Evaluate effectiveness of vasoactive medications to optimize hemodynamic stability behavior  - Position to facilitate oxygenation and minimize respiratory effort  - Oxygen supplementation based on oxygen saturation or ABGs  - Provide Smoking Cessation handout, if applicable  - Encourage broncho-pulmonary hygiene including cough, deep preethi

## 2020-02-05 NOTE — PLAN OF CARE
Pt rested through out night. VSS. No further complaints. Pt to d/c to [facility] today.    Problem: Patient/Family Goals  Goal: Patient/Family Long Term Goal  Description  Patient's Long Term Goal: To return to baseline health     Interventions:  - Take medica Maintains optimal cardiac output and hemodynamic stability  Description  INTERVENTIONS:  - Monitor vital signs, rhythm, and trends  - Monitor for bleeding, hypotension and signs of decreased cardiac output  - Evaluate effectiveness of vasoactive medication behavior  - Position to facilitate oxygenation and minimize respiratory effort  - Oxygen supplementation based on oxygen saturation or ABGs  - Provide Smoking Cessation handout, if applicable  - Encourage broncho-pulmonary hygiene including cough, deep preethi

## 2020-02-05 NOTE — PROGRESS NOTES
[facility]     Progress Note        Kristian De La Cruz Patient Status:  Observation    1940 MRN [id number]   Location [facility] 3W/SW Attending Marjan Reyna MD   Hosp Day # 0 PCP Marlyn Roblero MD       Subjective: Intravenous, Q6H PRN  Metoclopramide HCl (REGLAN) injection 5 mg, 5 mg, Intravenous, Q8H PRN  docusate sodium (COLACE) cap 100 mg, 100 mg, Oral, BID  PEG 3350 (MIRALAX) powder packet 17 g, 17 g, Oral, Daily PRN  magnesium hydroxide (MILK OF MAGNESIA) 400 M

## 2020-02-05 NOTE — PROGRESS NOTES
[facility]    Progress Note    [facility] Patient Status:  Inpatient    1940 MRN [id number]   Location [facility] 3W/SW Attending Monalisa Rhodes MD   Hosp Day # 0 PCP Hiral Mcgill MD       Subjective:    Stewart Baugh 100 mg Oral BID       Current PRN Inpatient Meds:      ipratropium-albuterol, Normal Saline Flush, Atropine Sulfate, nitroGLYCERIN, acetaminophen, acetaminophen **OR** HYDROcodone-acetaminophen **OR** HYDROcodone-acetaminophen, ondansetron HCl, Metoclopram cytocentrifuged smear. N/A    Body Fld Smear 3+ WBCs seen N/A    Body Fld Smear No organisms seen N/A       Imaging/EKG:   Xr Lumbar Spine (min 4 Views) (cpt=72110)    Result Date: 1/30/2020  CONCLUSION:  1.  Dextroscoliosis and multilevel lumbar spondylosi definite pneumothorax. No other significant interval change.     Dictated by (CST): Vera Neves MD on 2/02/2020 at 16:10     Approved by (CST): Vera Neves MD on 2/02/2020 at 16:11          Xr Chest Ap Portable  (cpt=71045)    Result Date: 1/31 stage 3, appears stable. SP ICD - lower battery life, per EP. RA - cont steroids, low dose     A-fib, on coumadin held for thoracentesis. Now resumed. Consider stopping as outpt if continues to be a fall risk    Sacral wound, stage II POA.   Santyl a

## 2020-02-05 NOTE — CM/SW NOTE
02/05/20 1600   Discharge disposition   Expected discharge disposition [facility]   Name of Facillity/Home Care/Hospice   [facility]/ [place])   Discharge transportation Private car   Patient has been accepted at [facility]

## 2020-02-05 NOTE — TELEPHONE ENCOUNTER
Copies made, to scan and accordion. Spoke to dtr and advised ready for p/u. Forms to FD awaiting p/u; daughter verbalized understanding.

## 2020-02-06 ENCOUNTER — TELEPHONE (OUTPATIENT)
Dept: INTERNAL MEDICINE CLINIC | Facility: CLINIC | Age: 80
End: 2020-02-06

## 2020-02-06 ENCOUNTER — PATIENT OUTREACH (OUTPATIENT)
Dept: CASE MANAGEMENT | Age: 80
End: 2020-02-06

## 2020-02-06 DIAGNOSIS — Z79.01 ANTICOAGULATED ON COUMADIN: ICD-10-CM
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
## 2020-02-06 DIAGNOSIS — Z02.9 ENCOUNTERS FOR UNSPECIFIED ADMINISTRATIVE PURPOSE: ICD-10-CM

## 2020-02-06 PROCEDURE — 1111F DSCHRG MED/CURRENT MED MERGE: CPT

## 2020-02-06 NOTE — CM/SW NOTE
The pt. Was discharged yesterday 2/5 to [facility]. Original [facility] referral was sent to [facility]. [facility] denied as they stated they were not aware the pt. Was discharging to [facility]. Referral was then made to [facility].   [facility] called MARILEE evans

## 2020-02-06 NOTE — TELEPHONE ENCOUNTER
Discussed with patient's daughter Suzzanne Leventhal. Patient has been taking Prozac 20 mg daily for some time. I have told her that the patient's dose can be increased back to 20 mg daily. It may need to wait till the patient is seen by me on Monday.   If necessary

## 2020-02-06 NOTE — TELEPHONE ENCOUNTER
[facility] called   Admitted patient for stage 2 pressure ulcer on buttock - will fax orders    Received order for patient's Pt/Inr -which will be drawn on Monday

## 2020-02-06 NOTE — TELEPHONE ENCOUNTER
I spoke with [facility] TASHIA Hackett at [facility] to let her know we received her message. To [facility], just [place] for next INR date.

## 2020-02-06 NOTE — PROGRESS NOTES
Initial Post Discharge Follow Up   Discharge Date: 2/5/20  Contact Date: 2/6/2020    Consent Verification:  Assessment Completed With: Other: Daughter, Solomon Linker received per patient?  written  HIPAA Verified?   Yes    Discharge Dx:  Pleural effus TIME DAILY  90 tablet 3   • Warfarin Sodium (COUMADIN) 2 MG Oral Tab Take 1 tablet (2 mg total) by mouth nightly.  Or as directed by coumadin clinic 90 tablet 1   • methotrexate 2.5 MG Oral Tab TAKE 2 TABLETS (5 MG TOTAL) BY MOUTH ONCE WEEKLY 26 tablet 3 yes    If yes, have you started these services? no    DME ordered at D/C? No     Needs post D/C:   Now that you are home, are there any needs or concerns you need addressed before your next visit with your PCP?  (DME, meds, disease concerns, Etc): Yes, dos

## 2020-02-06 NOTE — TELEPHONE ENCOUNTER
Discussed with Julia Francis from [address] We discussed that the patient is a stage II decubitus ulceration which is very superficial.  She has recommended treat with hydrogel. She will start this and see the patient 3 times a week.   She will keep

## 2020-02-06 NOTE — TELEPHONE ENCOUNTER
Julia/Kelly called     [facility] is unable to provide home health services - pt is working with [facility]

## 2020-02-06 NOTE — TELEPHONE ENCOUNTER
ANDREW spoke with patient's daughter, Alvaro Baugh, today for TCM. Patient has TCM/HFU appt scheduled for 2-10-20. While on the phone, she confirmed he is at [facility]. States he has been quite tired since d/c and has a nagging cough.   Denies any

## 2020-02-07 ENCOUNTER — PATIENT MESSAGE (OUTPATIENT)
Dept: INTERNAL MEDICINE CLINIC | Facility: CLINIC | Age: 80
End: 2020-02-07

## 2020-02-07 ENCOUNTER — TELEPHONE (OUTPATIENT)
Dept: INTERNAL MEDICINE CLINIC | Facility: CLINIC | Age: 80
End: 2020-02-07

## 2020-02-07 NOTE — TELEPHONE ENCOUNTER
Norman Brought   to Alisa Norman MD            2/7/20 10:57 AM   Hi Dr. Lionel Granado,     I have been trying to rollover my dad's ANAY into a local bank. Kelli Madera need a letter from you stating that my dad is cognitively impaired and unable to handle his

## 2020-02-07 NOTE — TELEPHONE ENCOUNTER
From: Rodri Carrera  To: Jacobo Askew MD  Sent: 2/7/2020 10:57 AM CST  Subject: Non-Urgent Medical Question    Hi Dr. Lionel Granado,    I have been trying to rollover my dad's ANAY into a local bank.  They need a letter from you stating that my dad is co

## 2020-02-10 ENCOUNTER — TELEPHONE (OUTPATIENT)
Dept: INTERNAL MEDICINE CLINIC | Facility: CLINIC | Age: 80
End: 2020-02-10

## 2020-02-10 ENCOUNTER — OFFICE VISIT (OUTPATIENT)
Dept: INTERNAL MEDICINE CLINIC | Facility: CLINIC | Age: 80
End: 2020-02-10
Payer: MEDICARE

## 2020-02-10 ENCOUNTER — PATIENT OUTREACH (OUTPATIENT)
Dept: CASE MANAGEMENT | Age: 80
End: 2020-02-10

## 2020-02-10 VITALS
HEIGHT: 68 IN | TEMPERATURE: 98 F | OXYGEN SATURATION: 99 % | HEART RATE: 68 BPM | DIASTOLIC BLOOD PRESSURE: 50 MMHG | SYSTOLIC BLOOD PRESSURE: 92 MMHG | WEIGHT: 149 LBS | BODY MASS INDEX: 22.58 KG/M2

## 2020-02-10 DIAGNOSIS — L89.159 PRESSURE INJURY OF SKIN OF SACRAL REGION, UNSPECIFIED INJURY STAGE: ICD-10-CM

## 2020-02-10 DIAGNOSIS — M05.79 SEROPOSITIVE RHEUMATOID ARTHRITIS OF MULTIPLE SITES (HCC): ICD-10-CM

## 2020-02-10 DIAGNOSIS — I42.9 CARDIOMYOPATHY, UNSPECIFIED TYPE (HCC): Primary | ICD-10-CM

## 2020-02-10 DIAGNOSIS — E78.00 HYPERCHOLESTEREMIA: ICD-10-CM

## 2020-02-10 DIAGNOSIS — G31.84 MILD COGNITIVE IMPAIRMENT WITH MEMORY LOSS: ICD-10-CM

## 2020-02-10 DIAGNOSIS — J43.9 PULMONARY EMPHYSEMA, UNSPECIFIED EMPHYSEMA TYPE (HCC): ICD-10-CM

## 2020-02-10 DIAGNOSIS — D69.6 THROMBOCYTOPENIA (HCC): ICD-10-CM

## 2020-02-10 DIAGNOSIS — I48.0 PAROXYSMAL ATRIAL FIBRILLATION (HCC): ICD-10-CM
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
## 2020-02-10 DIAGNOSIS — Z79.01 ANTICOAGULATED ON COUMADIN: ICD-10-CM

## 2020-02-10 DIAGNOSIS — Z95.810 ICD (IMPLANTABLE CARDIOVERTER-DEFIBRILLATOR) IN PLACE: ICD-10-CM

## 2020-02-10 DIAGNOSIS — N18.30 ANEMIA IN STAGE 3 CHRONIC KIDNEY DISEASE (HCC): ICD-10-CM

## 2020-02-10 DIAGNOSIS — D63.1 ANEMIA IN STAGE 3 CHRONIC KIDNEY DISEASE (HCC): ICD-10-CM

## 2020-02-10 DIAGNOSIS — N18.30 CKD (CHRONIC KIDNEY DISEASE), STAGE III (HCC): ICD-10-CM

## 2020-02-10 DIAGNOSIS — I50.23 ACUTE ON CHRONIC SYSTOLIC CONGESTIVE HEART FAILURE (HCC): ICD-10-CM

## 2020-02-10 DIAGNOSIS — F03.90 DEMENTIA WITHOUT BEHAVIORAL DISTURBANCE, UNSPECIFIED DEMENTIA TYPE (HCC): ICD-10-CM

## 2020-02-10 DIAGNOSIS — R53.83 FATIGUE, UNSPECIFIED TYPE: ICD-10-CM
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
## 2020-02-10 LAB — INR: 2.3 (ref 0.8–1.2)

## 2020-02-10 PROCEDURE — 1111F DSCHRG MED/CURRENT MED MERGE: CPT | Performed by: INTERNAL MEDICINE

## 2020-02-10 PROCEDURE — 99496 TRANSJ CARE MGMT HIGH F2F 7D: CPT | Performed by: INTERNAL MEDICINE

## 2020-02-10 NOTE — PATIENT INSTRUCTIONS
1.  Patient is to continue his current diet, medication and activity. 2.  I will give the patient and family in order to increase the patient's Prozac dose to 20 mg orally daily.   3.  I will give the family a letter indicating the patient is unable to man

## 2020-02-10 NOTE — TELEPHONE ENCOUNTER
Noted. Patient was seen today in the office with another daughter. At this time I gave her a letter indicating that the patient can no longer baldo his personal or financial affairs.

## 2020-02-10 NOTE — PROGRESS NOTES
Called pt's daughter Anatoliy Caldwell to introduce CCM program, left message to call back to discuss.

## 2020-02-10 NOTE — PROGRESS NOTES
HPI:    Coy Tamayo is a 78year old male here today for hospital follow up.    He was discharged from Inpatient hospital, [facility]  to Assisted living   Admission Date: 1/30/20   Discharge Date: 2/5/20  Hospital Discharge Diagnoses (since 1/11 quite weak. Patient uses a walker to ambulate. He is seen today in a wheelchair. Patient is currently receiving physical therapy, Occupational Therapy has a visiting nurse visiting him. Patient is comfortable today. He is awake and responsive.   His me obstructive pulmonary disease) ([facility] [address] Encounter for long-term (current) use of non-steroidal anti-inflammatories, Hyperlipidemia, Hypertension, Permanent cardiac pacemaker, RA (rheumatoid arthritis) ([facility] [address] and Renal insufficiency.     He  has a past surgica Size: adult)   Pulse 68   Temp 97.5 °F (36.4 °C) (Oral)   Ht 5' 8\" (1.727 m)   Wt 149 lb (67.6 kg)   SpO2 99%   BMI 22.66 kg/m²    GENERAL: Chronically ill, elderly white male in no apparent distress  SKIN: no rashes, no suspicious lesions  HEENT: atrauma Imaging & Consults:  None    New his current diet, medications and activity. I will increase the patient's Prozac to 20 mg daily which the dose he was on prior to his recent hospitalization.   Patient is currently being seen by visiting nurse as well

## 2020-02-11 ENCOUNTER — TELEPHONE (OUTPATIENT)
Dept: INTERNAL MEDICINE CLINIC | Facility: CLINIC | Age: 80
End: 2020-02-11

## 2020-02-11 DIAGNOSIS — Z79.01 ANTICOAGULATED ON COUMADIN: ICD-10-CM

## 2020-02-11 NOTE — TELEPHONE ENCOUNTER
[facility] called -  Directions for coumadin needs to be changed with pt's pharmacy    6 days 2 mg   On Tuesdays 1 mg

## 2020-02-15 ENCOUNTER — TELEPHONE (OUTPATIENT)
Dept: INTERNAL MEDICINE CLINIC | Facility: CLINIC | Age: 80
End: 2020-02-15

## 2020-02-15 NOTE — TELEPHONE ENCOUNTER
Discussed with Roxana LAO at assisted living. Patient initially did not want to take his medications. He wanted just to be \"left alone\". Blood pressure 157/79 and a repeat 146/66. Respiratory rate 18. Room air saturation 90%.   No shortness of breath

## 2020-02-16 ENCOUNTER — HOSPITAL ENCOUNTER (INPATIENT)
Facility: HOSPITAL | Age: 80
LOS: 4 days | Discharge: NURSING, REHAB OR LONG-TERM CARE | DRG: 291 | End: 2020-02-20
Attending: EMERGENCY MEDICINE | Admitting: HOSPITALIST
Payer: MEDICARE

## 2020-02-16 ENCOUNTER — APPOINTMENT (OUTPATIENT)
Dept: GENERAL RADIOLOGY | Facility: HOSPITAL | Age: 80
DRG: 291 | End: 2020-02-16
Attending: EMERGENCY MEDICINE
Payer: MEDICARE

## 2020-02-16 DIAGNOSIS — J44.1 COPD EXACERBATION (HCC): ICD-10-CM

## 2020-02-16 DIAGNOSIS — J11.1 INFLUENZA: Primary | ICD-10-CM

## 2020-02-16 LAB
ANION GAP SERPL CALC-SCNC: 8 MMOL/L (ref 0–18)
BASOPHILS # BLD AUTO: 0 X10(3) UL (ref 0–0.2)
BASOPHILS NFR BLD AUTO: 0 %
BUN BLD-MCNC: 34 MG/DL (ref 7–18)
BUN/CREAT SERPL: 21.4 (ref 10–20)
CALCIUM BLD-MCNC: 8.8 MG/DL (ref 8.5–10.1)
CHLORIDE SERPL-SCNC: 103 MMOL/L (ref 98–112)
CO2 SERPL-SCNC: 27 MMOL/L (ref 21–32)
CREAT BLD-MCNC: 1.59 MG/DL (ref 0.7–1.3)
DEPRECATED RDW RBC AUTO: 61.4 FL (ref 35.1–46.3)
EOSINOPHIL # BLD AUTO: 0 X10(3) UL (ref 0–0.7)
EOSINOPHIL NFR BLD AUTO: 0 %
ERYTHROCYTE [DISTWIDTH] IN BLOOD BY AUTOMATED COUNT: 17.1 % (ref 11–15)
FLUAV + FLUBV RNA SPEC NAA+PROBE: NEGATIVE
FLUAV + FLUBV RNA SPEC NAA+PROBE: NEGATIVE
FLUAV + FLUBV RNA SPEC NAA+PROBE: POSITIVE
GLUCOSE BLD-MCNC: 85 MG/DL (ref 70–99)
HCT VFR BLD AUTO: 31 % (ref 39–53)
HGB BLD-MCNC: 10.1 G/DL (ref 13–17.5)
IMM GRANULOCYTES # BLD AUTO: 0.02 X10(3) UL (ref 0–1)
IMM GRANULOCYTES NFR BLD: 0.4 %
INR BLD: 2.03 (ref 0.9–1.2)
LYMPHOCYTES # BLD AUTO: 0.2 X10(3) UL (ref 1–4)
LYMPHOCYTES NFR BLD AUTO: 3.7 %
MCH RBC QN AUTO: 32 PG (ref 26–34)
MCHC RBC AUTO-ENTMCNC: 32.6 G/DL (ref 31–37)
MCV RBC AUTO: 98.1 FL (ref 80–100)
MONOCYTES # BLD AUTO: 0.57 X10(3) UL (ref 0.1–1)
MONOCYTES NFR BLD AUTO: 10.5 %
MRSA DNA SPEC QL NAA+PROBE: NEGATIVE
NEUTROPHILS # BLD AUTO: 4.63 X10 (3) UL (ref 1.5–7.7)
NEUTROPHILS # BLD AUTO: 4.63 X10(3) UL (ref 1.5–7.7)
NEUTROPHILS NFR BLD AUTO: 85.4 %
OSMOLALITY SERPL CALC.SUM OF ELEC: 293 MOSM/KG (ref 275–295)
PLATELET # BLD AUTO: 70 10(3)UL (ref 150–450)
POTASSIUM SERPL-SCNC: 3.9 MMOL/L (ref 3.5–5.1)
PROTHROMBIN TIME: 23.1 SECONDS (ref 11.8–14.5)
RBC # BLD AUTO: 3.16 X10(6)UL (ref 3.8–5.8)
SODIUM SERPL-SCNC: 138 MMOL/L (ref 136–145)
TROPONIN I SERPL-MCNC: <0.045 NG/ML (ref ?–0.04)
WBC # BLD AUTO: 5.4 X10(3) UL (ref 4–11)

## 2020-02-16 PROCEDURE — 99222 1ST HOSP IP/OBS MODERATE 55: CPT | Performed by: INTERNAL MEDICINE

## 2020-02-16 PROCEDURE — 71045 X-RAY EXAM CHEST 1 VIEW: CPT | Performed by: EMERGENCY MEDICINE

## 2020-02-16 PROCEDURE — 99223 1ST HOSP IP/OBS HIGH 75: CPT | Performed by: HOSPITALIST

## 2020-02-16 RX ORDER — IPRATROPIUM BROMIDE AND ALBUTEROL SULFATE 2.5; .5 MG/3ML; MG/3ML
3 SOLUTION RESPIRATORY (INHALATION) 3 TIMES DAILY
Status: DISCONTINUED | OUTPATIENT
Start: 2020-02-16 | End: 2020-02-17

## 2020-02-16 RX ORDER — FOLIC ACID 1 MG/1
1 TABLET ORAL
Status: DISCONTINUED | OUTPATIENT
Start: 2020-02-16 | End: 2020-02-20

## 2020-02-16 RX ORDER — PREDNISONE 1 MG/1
2 TABLET ORAL
Status: DISCONTINUED | OUTPATIENT
Start: 2020-02-17 | End: 2020-02-16

## 2020-02-16 RX ORDER — ISOSORBIDE MONONITRATE 30 MG/1
60 TABLET, EXTENDED RELEASE ORAL DAILY
Status: DISCONTINUED | OUTPATIENT
Start: 2020-02-17 | End: 2020-02-20

## 2020-02-16 RX ORDER — WARFARIN SODIUM 1 MG/1
1 TABLET ORAL SEE ADMIN INSTRUCTIONS
COMMUNITY
End: 2020-07-24

## 2020-02-16 RX ORDER — IPRATROPIUM BROMIDE AND ALBUTEROL SULFATE 2.5; .5 MG/3ML; MG/3ML
3 SOLUTION RESPIRATORY (INHALATION)
Status: DISCONTINUED | OUTPATIENT
Start: 2020-02-16 | End: 2020-02-16

## 2020-02-16 RX ORDER — IPRATROPIUM BROMIDE AND ALBUTEROL SULFATE 2.5; .5 MG/3ML; MG/3ML
3 SOLUTION RESPIRATORY (INHALATION) EVERY 6 HOURS PRN
Status: DISCONTINUED | OUTPATIENT
Start: 2020-02-16 | End: 2020-02-20

## 2020-02-16 RX ORDER — SODIUM CHLORIDE 9 MG/ML
INJECTION, SOLUTION INTRAVENOUS CONTINUOUS
Status: DISCONTINUED | OUTPATIENT
Start: 2020-02-16 | End: 2020-02-18

## 2020-02-16 RX ORDER — FLUOXETINE HYDROCHLORIDE 20 MG/1
20 CAPSULE ORAL DAILY
Status: DISCONTINUED | OUTPATIENT
Start: 2020-02-17 | End: 2020-02-20

## 2020-02-16 RX ORDER — METHYLPREDNISOLONE SODIUM SUCCINATE 125 MG/2ML
60 INJECTION, POWDER, LYOPHILIZED, FOR SOLUTION INTRAMUSCULAR; INTRAVENOUS ONCE
Status: COMPLETED | OUTPATIENT
Start: 2020-02-16 | End: 2020-02-16

## 2020-02-16 RX ORDER — OSELTAMIVIR PHOSPHATE 30 MG/1
30 CAPSULE ORAL ONCE
Status: COMPLETED | OUTPATIENT
Start: 2020-02-16 | End: 2020-02-16

## 2020-02-16 RX ORDER — WARFARIN SODIUM 1 MG/1
1 TABLET ORAL
Status: DISCONTINUED | OUTPATIENT
Start: 2020-02-18 | End: 2020-02-18

## 2020-02-16 RX ORDER — WARFARIN SODIUM 2 MG/1
2 TABLET ORAL
Status: DISCONTINUED | OUTPATIENT
Start: 2020-02-16 | End: 2020-02-18

## 2020-02-16 RX ORDER — PREDNISONE 20 MG/1
20 TABLET ORAL
Status: DISCONTINUED | OUTPATIENT
Start: 2020-02-17 | End: 2020-02-19

## 2020-02-16 RX ORDER — SODIUM CHLORIDE 0.9 % (FLUSH) 0.9 %
3 SYRINGE (ML) INJECTION AS NEEDED
Status: DISCONTINUED | OUTPATIENT
Start: 2020-02-16 | End: 2020-02-20

## 2020-02-16 RX ORDER — CARVEDILOL 25 MG/1
25 TABLET ORAL 2 TIMES DAILY WITH MEALS
Status: DISCONTINUED | OUTPATIENT
Start: 2020-02-16 | End: 2020-02-20

## 2020-02-16 RX ORDER — TORSEMIDE 20 MG/1
20 TABLET ORAL EVERY OTHER DAY
COMMUNITY
End: 2020-04-21

## 2020-02-16 RX ORDER — FINASTERIDE 5 MG/1
5 TABLET, FILM COATED ORAL DAILY
Status: DISCONTINUED | OUTPATIENT
Start: 2020-02-17 | End: 2020-02-20

## 2020-02-16 RX ORDER — OSELTAMIVIR PHOSPHATE 30 MG/1
30 CAPSULE ORAL EVERY 12 HOURS SCHEDULED
Status: DISCONTINUED | OUTPATIENT
Start: 2020-02-16 | End: 2020-02-20

## 2020-02-16 RX ORDER — PREDNISONE 20 MG/1
40 TABLET ORAL
Status: DISCONTINUED | OUTPATIENT
Start: 2020-02-17 | End: 2020-02-16

## 2020-02-16 RX ORDER — IPRATROPIUM BROMIDE AND ALBUTEROL SULFATE 2.5; .5 MG/3ML; MG/3ML
3 SOLUTION RESPIRATORY (INHALATION) ONCE
Status: COMPLETED | OUTPATIENT
Start: 2020-02-16 | End: 2020-02-16

## 2020-02-16 RX ORDER — HYDRALAZINE HYDROCHLORIDE 25 MG/1
25 TABLET, FILM COATED ORAL 2 TIMES DAILY
Status: DISCONTINUED | OUTPATIENT
Start: 2020-02-16 | End: 2020-02-20

## 2020-02-16 RX ORDER — ACETAMINOPHEN 325 MG/1
650 TABLET ORAL EVERY 6 HOURS PRN
Status: DISCONTINUED | OUTPATIENT
Start: 2020-02-16 | End: 2020-02-20

## 2020-02-16 RX ORDER — ATORVASTATIN CALCIUM 20 MG/1
20 TABLET, FILM COATED ORAL NIGHTLY
Status: DISCONTINUED | OUTPATIENT
Start: 2020-02-16 | End: 2020-02-20

## 2020-02-16 NOTE — ED PROVIDER NOTES
Patient Seen in: [facility] AND [facility] Emergency Department      History   Patient presents with:  Dyspnea JANELLE SOB  Fever  Cough/URI    Stated Complaint: sob/fever    HPI    Patient is an 78-year-old male that transferred here by paramedics from [facility]. Oral   SpO2 90 %   O2 Device None (Room air)       Current:/63   Pulse 69   Temp 98.7 °F (37.1 °C) (Oral)   Resp 17   Wt 67.6 kg   SpO2 96%   BMI 22.66 kg/m²         Physical Exam    Constitutional: Oriented to person, place, and time.  Appears well-d the following components:    Influenza A by PCR Positive (*)     All other components within normal limits   CBC W/ DIFFERENTIAL - Abnormal; Notable for the following components:    RBC 3.16 (*)     HGB 10.1 (*)     HCT 31.0 (*)     RDW-SD 61.4 (*)     RDW Approved by (CST): Carlos Mosqueda MD on 2/02/2020 at 16:11          Us Thoracentesis Guided Right (cpt=32555)    Result Date: 2/2/2020  CONCLUSION: Sonographic guidance for thoracentesis.      Dictated by (CST): Carlos Mosqueda MD on 2/02/2020 at 15:05

## 2020-02-16 NOTE — CONSULTS
[facility]    Report of Consultation    Rodri Carrera Patient Status:  Inpatient    1940 MRN [id number]   Location [facility] 4W/SW/SE Attending Alexander Burch MD   Hosp Day # 0 PCP Jacobo Askew MD     Date of Adm • Eye Problems Brother    • Other (cancer lung) Brother    • Heart Disease Brother 79   • Other (crohns) Son    • Other (alive and well) Son    • Other (neorological) Daughter    • Other (alive and well) Daughter    • Other (rheumatoid arthritis) Chiqui [place] Take 1 mg by mouth See Admin Instructions. EVERY TUESDAY  torsemide 20 MG Oral Tab, Take 20 mg by mouth every other day.   FOLIC ACID 1 MG Oral Tab, TAKE ONE TABLET BY MOUTH ONE TIME DAILY   Warfarin Sodium (COUMADIN) 2 MG Oral Tab, Take 1 tablet (2 mg tota bilateral rhonchi and wheezes    Heart : S1 s2 RRR 2/6 sm no gallop   abd ; soft and benign   Ext : no edema   Awake , alert , oriented x 2  No focal deficit     Results:     Laboratory Data:  Lab Results   Component Value Date    WBC 5.4 02/16/2020    HGB effusion    5– dementia  Supportive care    6-DVT prophylaxis  On coumadin     [facility]        Thank you for allowing me to participate in the care of your patient. Adeola Moore.  Shyanne  2/16/2020

## 2020-02-16 NOTE — PLAN OF CARE
Problem: Patient/Family Goals  Goal: Patient/Family Long Term Goal  Description  Patient's Long Term Goal: go back to nursing home.    Problem: PAIN - ADULT  Goal: Verbalizes/displays adequate comfort level or patient's stated pain goal  Description  INTE caregiver  - Include patient/family/discharge partner in discharge planning  - Arrange for needed discharge resources and transportation as appropriate  - Identify discharge learning needs (meds, wound care, etc)  - Arrange for interpreters to assist at [facility]

## 2020-02-16 NOTE — TELEPHONE ENCOUNTER
Discussed with Carlie Duran nurse at patient's assisted living last evening. Patient is doing well. He does have some wheezing but no shortness of breath. Daughter power of  saw patient. We will continue to observe. Patient seems to be stable.  ( Vanessa Salinas

## 2020-02-16 NOTE — H&P
Rocaelreshma Patient Status:  Inpatient    1940 MRN [id number]   Location [facility] 4W/SW/SE Attending Gina Patino MD   Hosp Day # 0 PCP Edilma Mcmahan MD     Date:   Grandfather      Social History:  Social History    Tobacco Use      Smoking status: Former Smoker        Packs/day: 1.50        Years: 30.00        Pack years: 45        Types: Cigarettes      Smokeless tobacco: Never Used      Tobacco comment: quit smoki mouth daily. Review of Systems:           A comprehensive 12 point review of systems was completed. Pertinent positives and negatives noted in the the HPI.       Physical Exam:   Vital Signs:  Blood pressure 104/53, pulse 73, temperature 98.1 °F ( (cpt=71045)    Result Date: 2/16/2020  CONCLUSION:  1. Essentially unchanged chest from previous study read demonstrating heart size upper limits of normal to mildly prominent with normal pulmonary vascularity.   Prominence of pulmonary interstitium unchang

## 2020-02-16 NOTE — PROGRESS NOTES
[facility] Pharmacy Note:  Renal Adjustment for oseltamivir (TAMIFLU)    Terrell Veloz is a 78year old male who has been prescribed oseltamivir (TAMIFLU) 75 mg once. CrCl is estimated creatinine clearance is 34.8 mL/min (A) (based on SCr of 1.59 mg/dL (H)).  s

## 2020-02-16 NOTE — ED INITIAL ASSESSMENT (HPI)
Patient sent from NH for evaluation of sob, cough, and fever.   Nebulizer treatment in process on arrival to ED

## 2020-02-17 LAB
ALBUMIN SERPL-MCNC: 2.5 G/DL (ref 3.4–5)
ALBUMIN/GLOB SERPL: 0.8 {RATIO} (ref 1–2)
ALP LIVER SERPL-CCNC: 65 U/L (ref 45–117)
ALT SERPL-CCNC: 19 U/L (ref 16–61)
ANION GAP SERPL CALC-SCNC: 8 MMOL/L (ref 0–18)
AST SERPL-CCNC: 28 U/L (ref 15–37)
BASOPHILS # BLD AUTO: 0 X10(3) UL (ref 0–0.2)
BASOPHILS NFR BLD AUTO: 0 %
BILIRUB SERPL-MCNC: 1.1 MG/DL (ref 0.1–2)
BUN BLD-MCNC: 43 MG/DL (ref 7–18)
BUN/CREAT SERPL: 29.7 (ref 10–20)
CALCIUM BLD-MCNC: 8.3 MG/DL (ref 8.5–10.1)
CHLORIDE SERPL-SCNC: 105 MMOL/L (ref 98–112)
CO2 SERPL-SCNC: 26 MMOL/L (ref 21–32)
CREAT BLD-MCNC: 1.45 MG/DL (ref 0.7–1.3)
DEPRECATED RDW RBC AUTO: 58.9 FL (ref 35.1–46.3)
EOSINOPHIL # BLD AUTO: 0 X10(3) UL (ref 0–0.7)
EOSINOPHIL NFR BLD AUTO: 0 %
ERYTHROCYTE [DISTWIDTH] IN BLOOD BY AUTOMATED COUNT: 17 % (ref 11–15)
GLOBULIN PLAS-MCNC: 3.2 G/DL (ref 2.8–4.4)
GLUCOSE BLD-MCNC: 95 MG/DL (ref 70–99)
HCT VFR BLD AUTO: 29 % (ref 39–53)
HGB BLD-MCNC: 9.5 G/DL (ref 13–17.5)
IMM GRANULOCYTES # BLD AUTO: 0.03 X10(3) UL (ref 0–1)
IMM GRANULOCYTES NFR BLD: 0.8 %
INR BLD: 2.71 (ref 0.9–1.2)
LYMPHOCYTES # BLD AUTO: 0.14 X10(3) UL (ref 1–4)
LYMPHOCYTES NFR BLD AUTO: 3.8 %
M PROTEIN MFR SERPL ELPH: 5.7 G/DL (ref 6.4–8.2)
MCH RBC QN AUTO: 31.5 PG (ref 26–34)
MCHC RBC AUTO-ENTMCNC: 32.8 G/DL (ref 31–37)
MCV RBC AUTO: 96 FL (ref 80–100)
MONOCYTES # BLD AUTO: 0.14 X10(3) UL (ref 0.1–1)
MONOCYTES NFR BLD AUTO: 3.8 %
NEUTROPHILS # BLD AUTO: 3.37 X10 (3) UL (ref 1.5–7.7)
NEUTROPHILS # BLD AUTO: 3.37 X10(3) UL (ref 1.5–7.7)
NEUTROPHILS NFR BLD AUTO: 91.6 %
OSMOLALITY SERPL CALC.SUM OF ELEC: 299 MOSM/KG (ref 275–295)
PLATELET # BLD AUTO: 67 10(3)UL (ref 150–450)
POTASSIUM SERPL-SCNC: 3.8 MMOL/L (ref 3.5–5.1)
PROTHROMBIN TIME: 29.2 SECONDS (ref 11.8–14.5)
RBC # BLD AUTO: 3.02 X10(6)UL (ref 3.8–5.8)
SODIUM SERPL-SCNC: 139 MMOL/L (ref 136–145)
WBC # BLD AUTO: 3.7 X10(3) UL (ref 4–11)

## 2020-02-17 PROCEDURE — 99233 SBSQ HOSP IP/OBS HIGH 50: CPT | Performed by: HOSPITALIST

## 2020-02-17 PROCEDURE — 99232 SBSQ HOSP IP/OBS MODERATE 35: CPT | Performed by: INTERNAL MEDICINE

## 2020-02-17 RX ORDER — POTASSIUM CHLORIDE 1.5 G/1.77G
40 POWDER, FOR SOLUTION ORAL ONCE
Status: COMPLETED | OUTPATIENT
Start: 2020-02-17 | End: 2020-02-17

## 2020-02-17 RX ORDER — IPRATROPIUM BROMIDE AND ALBUTEROL SULFATE 2.5; .5 MG/3ML; MG/3ML
3 SOLUTION RESPIRATORY (INHALATION)
Status: DISCONTINUED | OUTPATIENT
Start: 2020-02-17 | End: 2020-02-18

## 2020-02-17 NOTE — PLAN OF CARE
Problem: Patient/Family Goals  Goal: Patient/Family Long Term Goal  Description  Patient's Long Term Goal:     Interventions:  -   - See additional Care Plan goals for specific interventions  Outcome: Progressing  Goal: Patient/Family Short Term Goal  Windy Bender assist with strengthening/mobility  - Encourage toileting schedule  Outcome: Progressing     Problem: DISCHARGE PLANNING  Goal: Discharge to home or other facility with appropriate resources  Description  INTERVENTIONS:  - Identify barriers to discharge w/

## 2020-02-17 NOTE — PHYSICAL THERAPY NOTE
PHYSICAL THERAPY EVALUATION - INPATIENT     Room Number: 454/454-A  Evaluation Date: 2/17/2020  Type of Evaluation: Initial   Physician Order: PT Eval and Treat    Presenting Problem: Influenza  Reason for Therapy: Mobility Dysfunction and Discharge Plann RECOMMENDATIONS  PT Discharge Recommendations: Home with home health PT;24 hour care/supervision(Return to [facility] memory care)    PLAN  PT Treatment Plan: Bed mobility; Body mechanics; Endurance; Patient education; Energy conservation;Gait training;Family PAIN ASSESSMENT  Ratin          COGNITION  · Overall Cognitive Status:  Impaired  · Orientation Level:  oriented to place, oriented to person, disoriented to time and disoriented to situation  · Following Commands:  follows one step comman Assistance: Not tested    Exercise/Education Provided:  Bed mobility  Body mechanics  Energy conservation  Functional activity tolerated  Gait training  Posture  Transfer training    Patient End of Session: Up in chair;Needs met;Call light within reach;RN

## 2020-02-17 NOTE — CM/SW NOTE
SW received MDO for discharge planning. Per nursing rounds/chart review pt is from [facility] and is current with [facility].      PT/OT have both evaluated patient and recommend pt is able to return to [facility] once medically stable with Nathen  marilyn

## 2020-02-17 NOTE — DIETARY NOTE
ADULT NUTRITION INITIAL ASSESSMENT    Pt is at moderate nutrition risk. Pt meets moderate malnutrition criteria.      CRITERIA FOR MALNUTRITION DIAGNOSIS:  Criteria for non-severe malnutrition diagnosis: acute illness/injury related to wt loss 7.5% in 3 mo pulmonary disease) ([facility]    • Encounter for long-term (current) use of non-steroidal anti-inflammatories    • High blood pressure    • Hyperlipidemia     High TGs   • Hypertension    • Permanent cardiac pacemaker    • RA (rheumatoid arthritis) ([facility]    • R Oral Once per day on Sun Mon Wed Thu Fri Sat   • [START ON 2/18/2020] Warfarin Sodium  1 mg Oral Once per day on Tue   • Oseltamivir Phosphate  30 mg Oral Q12H   • predniSONE  20 mg Oral Daily with breakfast       LABS: reviewed  Recent Labs     02/16/20

## 2020-02-17 NOTE — PROGRESS NOTES
[facility] HOSP - [facility]    Progress Note      Assessment and Plan:   1. Influenza exacerbating COPD–doing well clinically. Recommendations: Steroid, Tamiflu, discharge planning and will follow clinically.     2.  History of transudate of pleural effu

## 2020-02-17 NOTE — PROGRESS NOTES
[facility]    Progress Note    Kristian De La Cruz Patient Status:  Inpatient    1940 MRN [id number]   Location [facility] 4W/SW/SE Attending Marjan Reyna MD   Hosp Day # 1 PCP Marlyn Roblero MD       Subjective:    Will Sodium  1 mg Oral Once per day on Tue   • Oseltamivir Phosphate  30 mg Oral Q12H   • predniSONE  20 mg Oral Daily with breakfast       Current PRN Inpatient Meds:      Normal Saline Flush, acetaminophen, ipratropium-albuterol    Results:     Recent Labs -------------------------- Electronic ventricular pacemaker Pacemaker ECG When compared with ECG of 01/30/2020 13:10:08 No significant changes have occurred Electronically signed on 02/16/2020 at 19:27 by Latanya Jauregui MD    Assessment and Plan:   Influenza A

## 2020-02-17 NOTE — OCCUPATIONAL THERAPY NOTE
OCCUPATIONAL THERAPY EVALUATION - INPATIENT     Room Number: 454/454-A  Evaluation Date: 2/17/2020  Type of Evaluation: Initial       Physician Order: IP Consult to Occupational Therapy  Reason for Therapy: ADL/IADL Dysfunction and Discharge Planning    OC technique education       OCCUPATIONAL THERAPY MEDICAL/SOCIAL HISTORY     Problem List  Principal Problem:    Influenza  Active Problems:    COPD exacerbation [facility])      Past Medical History  Past Medical History:   Diagnosis Date   • Anemia    • Cardiomyo short term memory  Following Commands:  follows multistep commands with increased time and follows multistep commands with repetition  Initiation: cues to initiate tasks  Safety Judgement:  decreased awareness of need for assistance and decreased awareness Comment:          Goals  on: 20    Frequency: 3x week

## 2020-02-17 NOTE — PROGRESS NOTES
02/17/20 1548   Clinical Encounter Type   Visited With Patient   Routine Visit Introduction   Continue Visiting Yes   Referral From Nurse   Referral To    Sacramental Encounters   Communion Given Indicator Yes   Patient Spiritual Encounters   Sp

## 2020-02-18 ENCOUNTER — APPOINTMENT (OUTPATIENT)
Dept: GENERAL RADIOLOGY | Facility: HOSPITAL | Age: 80
DRG: 291 | End: 2020-02-18
Attending: HOSPITALIST
Payer: MEDICARE

## 2020-02-18 LAB
ANION GAP SERPL CALC-SCNC: 5 MMOL/L (ref 0–18)
BASOPHILS # BLD AUTO: 0 X10(3) UL (ref 0–0.2)
BASOPHILS NFR BLD AUTO: 0 %
BUN BLD-MCNC: 47 MG/DL (ref 7–18)
BUN/CREAT SERPL: 32.9 (ref 10–20)
C DIFF TOX B STL QL: NEGATIVE
CALCIUM BLD-MCNC: 8.4 MG/DL (ref 8.5–10.1)
CHLORIDE SERPL-SCNC: 106 MMOL/L (ref 98–112)
CO2 SERPL-SCNC: 26 MMOL/L (ref 21–32)
CREAT BLD-MCNC: 1.43 MG/DL (ref 0.7–1.3)
DEPRECATED RDW RBC AUTO: 60.2 FL (ref 35.1–46.3)
EOSINOPHIL # BLD AUTO: 0 X10(3) UL (ref 0–0.7)
EOSINOPHIL NFR BLD AUTO: 0 %
ERYTHROCYTE [DISTWIDTH] IN BLOOD BY AUTOMATED COUNT: 16.8 % (ref 11–15)
GLUCOSE BLD-MCNC: 94 MG/DL (ref 70–99)
HCT VFR BLD AUTO: 30.1 % (ref 39–53)
HGB BLD-MCNC: 9.9 G/DL (ref 13–17.5)
IMM GRANULOCYTES # BLD AUTO: 0.03 X10(3) UL (ref 0–1)
IMM GRANULOCYTES NFR BLD: 0.7 %
INR BLD: 2.94 (ref 0.9–1.2)
LYMPHOCYTES # BLD AUTO: 0.19 X10(3) UL (ref 1–4)
LYMPHOCYTES NFR BLD AUTO: 4.6 %
MCH RBC QN AUTO: 32.1 PG (ref 26–34)
MCHC RBC AUTO-ENTMCNC: 32.9 G/DL (ref 31–37)
MCV RBC AUTO: 97.7 FL (ref 80–100)
MONOCYTES # BLD AUTO: 0.35 X10(3) UL (ref 0.1–1)
MONOCYTES NFR BLD AUTO: 8.4 %
NEUTROPHILS # BLD AUTO: 3.6 X10 (3) UL (ref 1.5–7.7)
NEUTROPHILS # BLD AUTO: 3.6 X10(3) UL (ref 1.5–7.7)
NEUTROPHILS NFR BLD AUTO: 86.3 %
OSMOLALITY SERPL CALC.SUM OF ELEC: 296 MOSM/KG (ref 275–295)
PLATELET # BLD AUTO: 67 10(3)UL (ref 150–450)
POTASSIUM SERPL-SCNC: 4.2 MMOL/L (ref 3.5–5.1)
PROTHROMBIN TIME: 31.2 SECONDS (ref 11.8–14.5)
RBC # BLD AUTO: 3.08 X10(6)UL (ref 3.8–5.8)
SODIUM SERPL-SCNC: 137 MMOL/L (ref 136–145)
WBC # BLD AUTO: 4.2 X10(3) UL (ref 4–11)

## 2020-02-18 PROCEDURE — 71045 X-RAY EXAM CHEST 1 VIEW: CPT | Performed by: HOSPITALIST

## 2020-02-18 PROCEDURE — 99233 SBSQ HOSP IP/OBS HIGH 50: CPT | Performed by: HOSPITALIST

## 2020-02-18 PROCEDURE — 99232 SBSQ HOSP IP/OBS MODERATE 35: CPT | Performed by: INTERNAL MEDICINE

## 2020-02-18 RX ORDER — FUROSEMIDE 10 MG/ML
40 INJECTION INTRAMUSCULAR; INTRAVENOUS ONCE
Status: COMPLETED | OUTPATIENT
Start: 2020-02-18 | End: 2020-02-18

## 2020-02-18 RX ORDER — FUROSEMIDE 10 MG/ML
40 INJECTION INTRAMUSCULAR; INTRAVENOUS ONCE
Status: DISCONTINUED | OUTPATIENT
Start: 2020-02-18 | End: 2020-02-18

## 2020-02-18 RX ORDER — IPRATROPIUM BROMIDE AND ALBUTEROL SULFATE 2.5; .5 MG/3ML; MG/3ML
3 SOLUTION RESPIRATORY (INHALATION)
Status: DISCONTINUED | OUTPATIENT
Start: 2020-02-18 | End: 2020-02-20

## 2020-02-18 RX ORDER — FUROSEMIDE 10 MG/ML
40 INJECTION INTRAMUSCULAR; INTRAVENOUS DAILY
Status: DISCONTINUED | OUTPATIENT
Start: 2020-02-19 | End: 2020-02-20

## 2020-02-18 NOTE — PROGRESS NOTES
[facility] HOSP - [facility]    Progress Note      Assessment and Plan:   1. Influenza exacerbating COPD–doing well clinically. Recommendations: Steroid, Tamiflu, discharge planning and will follow clinically.     2.  History of transudate of pleural effu

## 2020-02-18 NOTE — PLAN OF CARE
Pt now on enteric precautions to rule out c-diff. Awaiting stool sample. Very calm, pleasant and cooperative today. Remains safe in environment.

## 2020-02-18 NOTE — PLAN OF CARE
Problem: Patient/Family Goals  Goal: Patient/Family Long Term Goal  Description  Patient's Long Term Goal:     Interventions: go home  -   - See additional Care Plan goals for specific interventions  Outcome: Progressing  Goal: Patient/Family Short Term OT/PT consult to assist with strengthening/mobility  - Encourage toileting schedule  Outcome: Progressing     Problem: DISCHARGE PLANNING  Goal: Discharge to home or other facility with appropriate resources  Description  INTERVENTIONS:  - Identify barrier

## 2020-02-18 NOTE — PROGRESS NOTES
[facility]    Progress Note    Coy Tamayo Patient Status:  Inpatient    1940 MRN [id number]   Location [facility] 4W/SW/SE Attending Bart Leiva MD   Hosp Day # 2 PCP Modesta Carbone MD       Subjective:    Will on Tue   • Warfarin Sodium  2 mg Oral Once per day on Sun Mon Wed Thu Fri Sat   • Warfarin Sodium  1 mg Oral Once per day on Tue   • Oseltamivir Phosphate  30 mg Oral Q12H   • predniSONE  20 mg Oral Daily with breakfast       Current PRN Inpatient Meds: 2/18/2020 at 10:02          Xr Chest Ap Portable  (cpt=71045)    Result Date: 2/16/2020  CONCLUSION:  1.  Essentially unchanged chest from previous study read demonstrating heart size upper limits of normal to mildly prominent with normal pulmonary vascular

## 2020-02-19 LAB
ANION GAP SERPL CALC-SCNC: 7 MMOL/L (ref 0–18)
BASOPHILS # BLD AUTO: 0 X10(3) UL (ref 0–0.2)
BASOPHILS NFR BLD AUTO: 0 %
BUN BLD-MCNC: 51 MG/DL (ref 7–18)
BUN/CREAT SERPL: 35.7 (ref 10–20)
CALCIUM BLD-MCNC: 8.7 MG/DL (ref 8.5–10.1)
CHLORIDE SERPL-SCNC: 103 MMOL/L (ref 98–112)
CO2 SERPL-SCNC: 27 MMOL/L (ref 21–32)
CREAT BLD-MCNC: 1.43 MG/DL (ref 0.7–1.3)
DEPRECATED RDW RBC AUTO: 59 FL (ref 35.1–46.3)
EOSINOPHIL # BLD AUTO: 0 X10(3) UL (ref 0–0.7)
EOSINOPHIL NFR BLD AUTO: 0 %
ERYTHROCYTE [DISTWIDTH] IN BLOOD BY AUTOMATED COUNT: 16.7 % (ref 11–15)
GLUCOSE BLD-MCNC: 96 MG/DL (ref 70–99)
HCT VFR BLD AUTO: 30.4 % (ref 39–53)
HGB BLD-MCNC: 9.9 G/DL (ref 13–17.5)
IMM GRANULOCYTES # BLD AUTO: 0.01 X10(3) UL (ref 0–1)
IMM GRANULOCYTES NFR BLD: 0.3 %
INR BLD: 3.26 (ref 0.9–1.2)
LYMPHOCYTES # BLD AUTO: 0.25 X10(3) UL (ref 1–4)
LYMPHOCYTES NFR BLD AUTO: 6.4 %
MCH RBC QN AUTO: 31.2 PG (ref 26–34)
MCHC RBC AUTO-ENTMCNC: 32.6 G/DL (ref 31–37)
MCV RBC AUTO: 95.9 FL (ref 80–100)
MONOCYTES # BLD AUTO: 0.52 X10(3) UL (ref 0.1–1)
MONOCYTES NFR BLD AUTO: 13.4 %
NEUTROPHILS # BLD AUTO: 3.11 X10 (3) UL (ref 1.5–7.7)
NEUTROPHILS # BLD AUTO: 3.11 X10(3) UL (ref 1.5–7.7)
NEUTROPHILS NFR BLD AUTO: 79.9 %
OSMOLALITY SERPL CALC.SUM OF ELEC: 298 MOSM/KG (ref 275–295)
PLATELET # BLD AUTO: 61 10(3)UL (ref 150–450)
POTASSIUM SERPL-SCNC: 4 MMOL/L (ref 3.5–5.1)
PROTHROMBIN TIME: 34 SECONDS (ref 11.8–14.5)
RBC # BLD AUTO: 3.17 X10(6)UL (ref 3.8–5.8)
SODIUM SERPL-SCNC: 137 MMOL/L (ref 136–145)
WBC # BLD AUTO: 3.9 X10(3) UL (ref 4–11)

## 2020-02-19 PROCEDURE — 99232 SBSQ HOSP IP/OBS MODERATE 35: CPT | Performed by: INTERNAL MEDICINE

## 2020-02-19 PROCEDURE — 99232 SBSQ HOSP IP/OBS MODERATE 35: CPT | Performed by: HOSPITALIST

## 2020-02-19 NOTE — PLAN OF CARE
Pt A&OX3, afebrile, O2 room air, mild exc dyspnea, dry cough, breath sounds diminished bilaterally, appetite good today, no n/v/. Up to chair. Tamiflu day #4, plan for d/c to [facility] tomorrow 2/20.     Problem: Patient/Family Goals  Goal: Patien safety including physical limitations  - Instruct pt to call for assistance with activity based on assessment  - Modify environment to reduce risk of injury  - Provide assistive devices as appropriate  - Consider OT/PT consult to assist with strengthening/

## 2020-02-19 NOTE — PLAN OF CARE
Problem: Patient/Family Goals  Goal: Patient/Family Long Term Goal  Description  Patient's Long Term Goal: go home    Interventions:  -   - See additional Care Plan goals for specific interventions   Outcome: Progressing  Goal: Patient/Family Short Term OT/PT consult to assist with strengthening/mobility  - Encourage toileting schedule  Outcome: Progressing     Problem: DISCHARGE PLANNING  Goal: Discharge to home or other facility with appropriate resources  Description  INTERVENTIONS:  - Identify barrier

## 2020-02-19 NOTE — PLAN OF CARE
Patient up with assist and walker, calls appropriately. Extremely hard of hearing, friendly and cooperative. Pt kept taking O2 off, then he left it off and he was satting at 92-93% on room air after walking to bathroom.   O2 left off, but tubing left in c

## 2020-02-19 NOTE — CM/SW NOTE
SW received call from Dtr - she requests whenever pt is cleared for discharge to give dtr a call (at least 1 hour notice) in order to notify another family member to come and  pt for discharge.      Dtr can be reached at:     [phone]  [phone]

## 2020-02-19 NOTE — PROGRESS NOTES
[facility]    Progress Note      Assessment and Plan:   1. Influenza exacerbating COPD–doing well clinically. Doing very well clinically. He is okay to be discharged home from my perspective. Can stop the steroid.     Recommendations: Lydia Serrano 02/19/2020     02/19/2020    CO2 27.0 02/19/2020    GLU 96 02/19/2020    CA 8.7 02/19/2020    INR 3.26 02/19/2020     Chest x-ray– modest right pleural effusion with pulmonary edema.     Maranda Danielson MD  Medical Director, Critical Care, [facility]

## 2020-02-19 NOTE — PROGRESS NOTES
[facility]    Progress Note    Donis Abdalla Patient Status:  Inpatient    1940 MRN [id number]   Location [facility] 4W/SW/SE Attending Art Hampton MD   Hosp Day # 3 PCP Berto Beckham MD        Subjective:   Rosy Arauz Component Value Date    WBC 3.9 (L) 02/19/2020    HGB 9.9 (L) 02/19/2020    HCT 30.4 (L) 02/19/2020    PLT 61.0 (L) 02/19/2020    CREATSERUM 1.43 (H) 02/19/2020    BUN 51 (H) 02/19/2020     02/19/2020    K 4.0 02/19/2020     02/19/2020    CO2

## 2020-02-20 ENCOUNTER — PATIENT OUTREACH (OUTPATIENT)
Dept: CASE MANAGEMENT | Age: 80
End: 2020-02-20

## 2020-02-20 VITALS
TEMPERATURE: 97 F | DIASTOLIC BLOOD PRESSURE: 80 MMHG | RESPIRATION RATE: 20 BRPM | WEIGHT: 135.63 LBS | BODY MASS INDEX: 21 KG/M2 | SYSTOLIC BLOOD PRESSURE: 137 MMHG | HEART RATE: 70 BPM | OXYGEN SATURATION: 91 %

## 2020-02-20 LAB
ANION GAP SERPL CALC-SCNC: 10 MMOL/L (ref 0–18)
BASOPHILS # BLD AUTO: 0 X10(3) UL (ref 0–0.2)
BASOPHILS NFR BLD AUTO: 0 %
BUN BLD-MCNC: 44 MG/DL (ref 7–18)
BUN/CREAT SERPL: 33.3 (ref 10–20)
CALCIUM BLD-MCNC: 8.3 MG/DL (ref 8.5–10.1)
CHLORIDE SERPL-SCNC: 104 MMOL/L (ref 98–112)
CO2 SERPL-SCNC: 24 MMOL/L (ref 21–32)
CREAT BLD-MCNC: 1.32 MG/DL (ref 0.7–1.3)
DEPRECATED RDW RBC AUTO: 58.9 FL (ref 35.1–46.3)
EOSINOPHIL # BLD AUTO: 0 X10(3) UL (ref 0–0.7)
EOSINOPHIL NFR BLD AUTO: 0 %
ERYTHROCYTE [DISTWIDTH] IN BLOOD BY AUTOMATED COUNT: 16.7 % (ref 11–15)
GLUCOSE BLD-MCNC: 86 MG/DL (ref 70–99)
HAV IGM SER QL: 2.2 MG/DL (ref 1.6–2.6)
HCT VFR BLD AUTO: 28.7 % (ref 39–53)
HGB BLD-MCNC: 9.7 G/DL (ref 13–17.5)
IMM GRANULOCYTES # BLD AUTO: 0.01 X10(3) UL (ref 0–1)
IMM GRANULOCYTES NFR BLD: 0.3 %
INR BLD: 2.6 (ref 0.9–1.2)
LYMPHOCYTES # BLD AUTO: 0.31 X10(3) UL (ref 1–4)
LYMPHOCYTES NFR BLD AUTO: 8.9 %
MCH RBC QN AUTO: 32.6 PG (ref 26–34)
MCHC RBC AUTO-ENTMCNC: 33.8 G/DL (ref 31–37)
MCV RBC AUTO: 96.3 FL (ref 80–100)
MONOCYTES # BLD AUTO: 0.43 X10(3) UL (ref 0.1–1)
MONOCYTES NFR BLD AUTO: 12.3 %
NEUTROPHILS # BLD AUTO: 2.74 X10 (3) UL (ref 1.5–7.7)
NEUTROPHILS # BLD AUTO: 2.74 X10(3) UL (ref 1.5–7.7)
NEUTROPHILS NFR BLD AUTO: 78.5 %
OSMOLALITY SERPL CALC.SUM OF ELEC: 296 MOSM/KG (ref 275–295)
PHOSPHATE SERPL-MCNC: 2.7 MG/DL (ref 2.5–4.9)
PLATELET # BLD AUTO: 54 10(3)UL (ref 150–450)
POTASSIUM SERPL-SCNC: 4 MMOL/L (ref 3.5–5.1)
PROTHROMBIN TIME: 28.3 SECONDS (ref 11.8–14.5)
RBC # BLD AUTO: 2.98 X10(6)UL (ref 3.8–5.8)
SODIUM SERPL-SCNC: 138 MMOL/L (ref 136–145)
WBC # BLD AUTO: 3.5 X10(3) UL (ref 4–11)

## 2020-02-20 PROCEDURE — 99239 HOSP IP/OBS DSCHRG MGMT >30: CPT | Performed by: HOSPITALIST

## 2020-02-20 PROCEDURE — 99232 SBSQ HOSP IP/OBS MODERATE 35: CPT | Performed by: INTERNAL MEDICINE

## 2020-02-20 RX ORDER — OSELTAMIVIR PHOSPHATE 30 MG/1
30 CAPSULE ORAL EVERY 12 HOURS SCHEDULED
Qty: 4 CAPSULE | Refills: 0 | Status: SHIPPED | OUTPATIENT
Start: 2020-02-20 | End: 2020-07-24

## 2020-02-20 NOTE — PLAN OF CARE
A&Ox4 but forgetful at times. Hard of hearing. Upx1 walker and staff. Regular diet, tolerating well. Ambulated in room with PT, tolerated well. Dry cough. O2 on exertion was 92% and 95% on room air. Pt denies any complaints at this time.  Report given to ADULT - FALL  Goal: Free from fall injury  Description  INTERVENTIONS:  - Assess pt frequently for physical needs  - Identify cognitive and physical deficits and behaviors that affect risk of falls. - [place] fall precautions as indicated by assessment. patient/family to participate in care and decision-making at the level they choose  - Honor patient and family perspectives and choices  Outcome: Adequate for Discharge

## 2020-02-20 NOTE — PHYSICAL THERAPY NOTE
PHYSICAL THERAPY TREATMENT NOTE - INPATIENT     Room Number: 454/454-A       Presenting Problem: Influenza    Problem List  Principal Problem:    Influenza  Active Problems:    COPD exacerbation ([facility] 75.)      PHYSICAL THERAPY ASSESSMENT     Patient in bed agr Room Air at Rest: 94  SPO2 Ambulation on Room Air: 8499 [facility]  How much difficulty does the patient currently have. ..  -   Turning over in bed (including adjusting bedclothes, sheets and blankets)? Patient to demonstrate independence with home activity/exercise instructions provided to patient in preparation for discharge- and verbal cues   Goal #4  Current Status In progress

## 2020-02-20 NOTE — CM/SW NOTE
SW was notified pt is medically cleared for discharge to [facility]. SW spoke to dtr/Elvira - she is comfortable w/ patient discharge later this afternoon. Family will  pt around 2-3pm today.      Pt has been weaned off of O2 - no needs at

## 2020-02-20 NOTE — PROGRESS NOTES
MONSE 2/20-1st attempt for apt request [facility]/COPD     [address]  [phone]    Apt made Thurs.  Feb.27th @2:30pm

## 2020-02-20 NOTE — PROGRESS NOTES
[facility]    Progress Note      Assessment and Plan:   1. Influenza exacerbating COPD– vastly improved and okay to go home from my perspective. Recommendations: Okay to discharge home.   Follow-up with me as needed for the pleural effus [address] [facility]  Medical Director, [address] [place] [address]. [phone]  Pager: [phone]

## 2020-02-21 ENCOUNTER — TELEPHONE (OUTPATIENT)
Dept: INTERNAL MEDICINE CLINIC | Facility: CLINIC | Age: 80
End: 2020-02-21

## 2020-02-21 ENCOUNTER — PATIENT MESSAGE (OUTPATIENT)
Dept: INTERNAL MEDICINE CLINIC | Facility: CLINIC | Age: 80
End: 2020-02-21

## 2020-02-21 DIAGNOSIS — Z79.01 ANTICOAGULATED ON COUMADIN: ICD-10-CM

## 2020-02-21 NOTE — DISCHARGE SUMMARY
[facility]    PATIENT'S NAME: Sherron Wilber   ATTENDING PHYSICIAN: Anthony Manning MD   PATIENT ACCOUNT#:   [id number]    LOCATION:  [address] [place] [address] RECORD #:   [id number]       YOB: 1940  ADMISSION DATE:       02/16 saturating at 93% on room air. GENERAL:  The patient lying in bed, appears to be in no acute distress at this time. He is A and O x3. HEENT:  Extraocular movements are intact. Pupils equal, round, and reactive to light and accommodation.   Atraumatic, n

## 2020-02-21 NOTE — TELEPHONE ENCOUNTER
To Dr. Rashi Lopez----    Received the following mychart:    \"Dr. Reece Denton,    My dad just spent another week at the hospital with the flu, hopefully you've been notified. Latrell Ortega transported him back to [facility] living in [place] yesterday and now he has become

## 2020-02-21 NOTE — TELEPHONE ENCOUNTER
Spoke with Tucker Camarena from [facility] and relayed OK per protocol for Dr. PORTER to sign orders for nursing, OT and PT.  Informed her I will route questions of INR to Darleen Jackson and she will receive a call from her with instruction, she agrees to wait for Delia's adv

## 2020-02-21 NOTE — TELEPHONE ENCOUNTER
Ihsan Valdivia from [address] calling. Patient started Tamiflu started yesterday. Should be done by Saturday. Asking if INR should be done today or wait until after Saturday. Patient discharged yesterday.  Home healthcare started with nursing, OT and PT t

## 2020-02-21 NOTE — TELEPHONE ENCOUNTER
From: Jacqueline Russell  To: Charmayne Applebaum, MD  Sent: 2/21/2020 4:35 PM CST  Subject: Visit Follow-up Question    Dr. Michelle Prajapati,    My dad just spent another week at the hospital with the flu, hopefully you've been notified.  We transported him back to [facility]

## 2020-02-24 ENCOUNTER — PATIENT OUTREACH (OUTPATIENT)
Dept: CASE MANAGEMENT | Age: 80
End: 2020-02-24

## 2020-02-24 DIAGNOSIS — J11.1 INFLUENZA: ICD-10-CM

## 2020-02-24 DIAGNOSIS — Z02.9 ENCOUNTERS FOR UNSPECIFIED ADMINISTRATIVE PURPOSE: ICD-10-CM

## 2020-02-24 PROCEDURE — 1111F DSCHRG MED/CURRENT MED MERGE: CPT

## 2020-02-24 NOTE — PROGRESS NOTES
Patient's daughter returned call, LEE SAINI for post hospital follow up. [facility] contact information provided.

## 2020-02-25 ENCOUNTER — TELEPHONE (OUTPATIENT)
Dept: INTERNAL MEDICINE CLINIC | Facility: CLINIC | Age: 80
End: 2020-02-25

## 2020-02-25 NOTE — TELEPHONE ENCOUNTER
Spoke to patient's daughter today, Pool Chavez (on HIPAA) for TCM. He does not have a TCM/HFU appt scheduled at this time. He does have a f/u appt scheduled on 3-20-20.       Daughter reports patient is physically doing well, denies any concerns with the recent

## 2020-02-25 NOTE — TELEPHONE ENCOUNTER
[facility] [place]    Following up on fax that was sent to   Dr Alondra Murphy yesterday   To confirm pt's medications

## 2020-02-25 NOTE — PROGRESS NOTES
Initial Post Discharge Follow Up   Discharge Date: 2/20/20  Contact Date: 2/25/2020    Consent Verification:  Assessment Completed With: Other: Daughter, Truong Aguilar received per patient?  written  HIPAA Verified? Yes    Discharge Dx:   Influenza A Oral Cap Take 1 capsule (30 mg total) by mouth every 12 (twelve) hours. 4 capsule 0   • Warfarin Sodium 1 MG Oral Tab Take 1 mg by mouth See Admin Instructions. EVERY TUESDAY     • torsemide 20 MG Oral Tab Take 20 mg by mouth every other day.      • FOLIC A medications when you left the hospital? Yes  • May I go over your medications with you to make sure we are not missing anything? yes  • Are there any reasons that keep you from taking your medication as prescribed?  No  Are you having any concerns with const [address] [place]  [address] [facility]  [address]  [address], [address] [facility]  [address] [facility]

## 2020-02-26 ENCOUNTER — TELEPHONE (OUTPATIENT)
Dept: INTERNAL MEDICINE CLINIC | Facility: CLINIC | Age: 80
End: 2020-02-26

## 2020-02-26 NOTE — TELEPHONE ENCOUNTER
Noted.  I have called patient's daughter, Isadora Ruiz and I discussed the situation with her today. I also talked to 1 of the nurses at [facility] today. I have signed the orders that were sent to me to sign.   I have also recommended the use [employer]

## 2020-02-26 NOTE — TELEPHONE ENCOUNTER
Telephone call to patient's daughter and situation discussed. He has noted in the note patient is having a difficult time at [facility] in [facility].   I will contact the facility to see if we can give the patient a mild tranquilizer such as lorazepam that he c

## 2020-02-26 NOTE — TELEPHONE ENCOUNTER
Dr. Christian Mack, please advise. Hospital discharge suggests that diuretics be held. Patent is not scheduled for follow up until 3/20/20. There were a few messages from family and home health from yesterday. Prednisone is from Dr. Jessica Case.    Isosorbide

## 2020-02-26 NOTE — TELEPHONE ENCOUNTER
Roxana / Trish Reyes calling to follow up on fax    Pt needs refill for     Atorvastatin  Torsemide 20 mg  Isosorbide 30 mg  Carvedilol 25 mg  Prednisone 1 mg

## 2020-02-26 NOTE — TELEPHONE ENCOUNTER
All paperwork faxed to [facility]

## 2020-02-26 NOTE — TELEPHONE ENCOUNTER
Kev Rodrigues / Judson Reed is calling when pt woke this morning his left eye by the retina is red  Denies pain and itching, no injury

## 2020-02-26 NOTE — TELEPHONE ENCOUNTER
Did.  I reviewed the orders that were faxed from [facility] late Monday, 2/24. I have reviewed the orders with the patient's orders in the progress notes.   The orders are the same as what are present in the progress notes from his last visit

## 2020-02-28 ENCOUNTER — TELEPHONE (OUTPATIENT)
Dept: INTERNAL MEDICINE CLINIC | Facility: CLINIC | Age: 80
End: 2020-02-28

## 2020-02-28 DIAGNOSIS — Z79.01 ANTICOAGULATED ON COUMADIN: ICD-10-CM

## 2020-02-28 RX ORDER — LORAZEPAM 0.5 MG/1
0.5 TABLET ORAL EVERY 4 HOURS PRN
Refills: 0 | Status: CANCELLED | OUTPATIENT
Start: 2020-02-28

## 2020-02-28 NOTE — TELEPHONE ENCOUNTER
I spoke with Roxana at [facility] and she that patient's left eye is doing better today. She says the redness is almost gone. She says that she will fax lab results over today. To Dr. Carl Phelan, just [facility].

## 2020-02-28 NOTE — TELEPHONE ENCOUNTER
To DR. PORTER - called Taylor Patient from [facility] - wants Ativan RX faxed to her [phone] - ( Lorazepam 0.5 mg po BID PRN as needed for agitation)

## 2020-02-28 NOTE — TELEPHONE ENCOUNTER
Pt daughter Roselyn Salter is calling to check on the status of pt getting a rx for Ativan  Pt will not be using [facility]  Tasked to nursing high

## 2020-02-28 NOTE — TELEPHONE ENCOUNTER
RX for Ativan 0.5mg BID PRN for agitation # 60 with 5 RF faxed to [facility] to Dilshad received to scanning.  Called daughter per hipaa and relayed this message -  Verbalized understanding

## 2020-02-28 NOTE — TELEPHONE ENCOUNTER
To MD:  The above refill request is for a controlled substance. Please review pended medication order. Print and sign for staff to fax to pharmacy or prescribe electronically. To DR. PORTER    See other TT [facility] wants RX faxed to them

## 2020-02-28 NOTE — TELEPHONE ENCOUNTER
[place] [phone]   Need a script send to [address] [place] [address] for pt Lorazepam.

## 2020-02-28 NOTE — TELEPHONE ENCOUNTER
Noted. Telephone call to [facility] and message left for Roxana.  I am glad here that the patient's eye is doing better. She is to call me back with any more problems.

## 2020-02-29 NOTE — TELEPHONE ENCOUNTER
Discussed with Kayla Celaya. Continue same dose of Coumadin and repeat INR next week. I will route this to [facility] as an [facility].   Thank you!!

## 2020-03-03 ENCOUNTER — TELEPHONE (OUTPATIENT)
Dept: INTERNAL MEDICINE CLINIC | Facility: CLINIC | Age: 80
End: 2020-03-03

## 2020-03-03 NOTE — TELEPHONE ENCOUNTER
Shalonda from [facility] is calling to check status of abnormal lab results that was faxed over on 02/28 pt. has a low potassium level and is on diuretics and isn't taking any potassium supplement.  His cbc results are abnormal his hemoglobin is low  And he isn

## 2020-03-03 NOTE — TELEPHONE ENCOUNTER
To Dr. Deloris Phillip - see below, labs on your desk. Can this wait for Dr. Michelle Prajapati tomorrow?

## 2020-03-03 NOTE — TELEPHONE ENCOUNTER
To nursing,   Tell the nurse at [facility]:    Potassium level 3.4 on 2/27/20 is noted.   Since patient is taking Spironolactone 25 mg daily which is potassium sparing, in addition to torsemide 20 mg alternating with 10 mg every other day, I am not sure if

## 2020-03-03 NOTE — TELEPHONE ENCOUNTER
Spoke with Sherif Hendrickson at [facility] (covering for [employer]). Relayed Dr. Rosa Isela Jasso' message. Sherif Hendrickson verbalized understanding. Sherif Hendrickson requests any new orders from Dr. German Branch be faxed to [facility] at fax: [phone].  They are requesting the following orders:

## 2020-03-04 NOTE — TELEPHONE ENCOUNTER
Spoke with [address] at [facility]   See notes below from Joe Gonzalez additional comments    Pt has lost 20 lbs since around 2-27-20  Please fax orders to [phone]

## 2020-03-04 NOTE — TELEPHONE ENCOUNTER
Telephone call to [facility] regarding patient. I left a message with him that I called them and I will attempt to call back later this afternoon. I have also advised that they can call me in my office if they wish.   I will try to call back

## 2020-03-06 ENCOUNTER — TELEPHONE (OUTPATIENT)
Dept: INTERNAL MEDICINE CLINIC | Facility: CLINIC | Age: 80
End: 2020-03-06

## 2020-03-06 DIAGNOSIS — Z79.01 ANTICOAGULATED ON COUMADIN: ICD-10-CM

## 2020-03-06 LAB — INR: 1.4 (ref 0.8–1.2)

## 2020-03-06 NOTE — TELEPHONE ENCOUNTER
Spoke to Hammad acosta and [address] [place] [address] nurses who are concerned about patient's INR and would like to speak to someone about this. Hammad acosta states patient has been taking 1mg on Tuesdays and 2mg all other days.  She has not noticed any bleeding, but he does have large b

## 2020-03-13 ENCOUNTER — TELEPHONE (OUTPATIENT)
Dept: INTERNAL MEDICINE CLINIC | Facility: CLINIC | Age: 80
End: 2020-03-13

## 2020-03-13 NOTE — TELEPHONE ENCOUNTER
From Garry, visiting nurse, regarding patient's anticoagulation, INR and pro time. Patient has been taking 2 mg of Coumadin for the last 5 days. His INR today is 1.3 with a pro time of 15.3.   I have asked Garry, visiting nurse, to increase the patient'

## 2020-03-13 NOTE — TELEPHONE ENCOUNTER
Montserrat from [facility]. is calling with results PT 15.3   INR 1.3    ph.  # [phone]  Routed high to niya

## 2020-03-20 ENCOUNTER — TELEPHONE (OUTPATIENT)
Dept: INTERNAL MEDICINE CLINIC | Facility: CLINIC | Age: 80
End: 2020-03-20

## 2020-03-20 DIAGNOSIS — Z79.01 ANTICOAGULATED ON COUMADIN: ICD-10-CM

## 2020-03-20 LAB — INR: 1.3 (ref 0.8–1.2)

## 2020-03-21 DIAGNOSIS — Z79.01 ANTICOAGULATED ON COUMADIN: ICD-10-CM

## 2020-03-23 RX ORDER — WARFARIN SODIUM 3 MG/1
3 TABLET ORAL DAILY
Qty: 30 TABLET | Refills: 3 | Status: CANCELLED | OUTPATIENT
Start: 2020-03-23

## 2020-03-23 RX ORDER — WARFARIN SODIUM 4 MG/1
4 TABLET ORAL DAILY
Qty: 30 TABLET | Refills: 3 | Status: CANCELLED | OUTPATIENT
Start: 2020-03-23

## 2020-03-27 ENCOUNTER — TELEPHONE (OUTPATIENT)
Dept: INTERNAL MEDICINE CLINIC | Facility: CLINIC | Age: 80
End: 2020-03-27

## 2020-03-27 NOTE — TELEPHONE ENCOUNTER
Order for portable chest - x-ry faxed Ihsan Valdivia at [phone]- confirmation received.  Original to scanning , copy in weekly folder

## 2020-03-27 NOTE — TELEPHONE ENCOUNTER
Telephone call to visiting nurse, Kayla Celaya, and situation discussed. Patient is to continue his same dose of Coumadin and repeat an INR in 2 weeks. I have also written an order for the patient have a portable chest x-ray.   I left the order on my nurse's d

## 2020-03-27 NOTE — TELEPHONE ENCOUNTER
[facility] [phone]    INR 2.1 PT 25.5    4mg Mon Wed Fri all other days 3mg    Pt have a skin tear on left forearm  need wound care. Pt having a dry cough. Like to order Chest Xray or blood work fax# [phone].

## 2020-03-30 ENCOUNTER — TELEPHONE (OUTPATIENT)
Dept: INTERNAL MEDICINE CLINIC | Facility: CLINIC | Age: 80
End: 2020-03-30

## 2020-03-30 NOTE — TELEPHONE ENCOUNTER
Noted.  I have written an order for the patient have a urinalysis and urine culture and left it on my nurse's desk. Please fax this to [facility] as requested on Tuesday morning. Also, please notify them that this is being done.   I will rout

## 2020-03-30 NOTE — TELEPHONE ENCOUNTER
Per Dr Anne Vanegas, he spoke to [facility] nurse on Friday night and gave orders for Levaquin 500 mg x5 days due to chest xray results that were phoned in to him. He called the RX into the pharmacy. The chest xray report was faxed over to our office today.  Abbi

## 2020-03-30 NOTE — TELEPHONE ENCOUNTER
Sandeep Ch called from [facility], reporting a fall, pt fell in his room  No injuries, no pain, range of movement is ok, vitals fine  Anu Davies is unsteady, this is not normal for pt  Pt more confused that normal baseline  Pt has been having behaviors  Requesting ord

## 2020-03-30 NOTE — TELEPHONE ENCOUNTER
Spoke to Michael and notified her that Dr Heraclio Medrano wrote order for UA and culture. Notified her I would fax it now. Written order faxed to #[phone]. Confirmation received. ORder to scanning.

## 2020-04-02 ENCOUNTER — TELEPHONE (OUTPATIENT)
Dept: INTERNAL MEDICINE CLINIC | Facility: CLINIC | Age: 80
End: 2020-04-02

## 2020-04-02 NOTE — TELEPHONE ENCOUNTER
Spoke to Sukhi Lunsford. Patient fell about 20 minutes ago in bathroom. Fall was unwitnessed. Patient denied hitting head. Denies pain. Sukhi Lunsford states he does have dark purple bruising along midline of back.   BP 2 min after fall: 165/47  He is having some shor

## 2020-04-02 NOTE — TELEPHONE ENCOUNTER
Pt fell 20 minutes ago  Bruising on both forearms and around mid-line of back  This is FYI, please call back that message rec'd  Tasked to nursing

## 2020-04-02 NOTE — TELEPHONE ENCOUNTER
Telephone call to [facility] and situation discussed with Thomas Funez. Patient is doing well at this time. There is no apparent injury. Thomas Funez asked if I will sign the order which he has faxed to resume physical therapy and Occupational Therapy.

## 2020-04-03 ENCOUNTER — TELEPHONE (OUTPATIENT)
Dept: INTERNAL MEDICINE CLINIC | Facility: CLINIC | Age: 80
End: 2020-04-03

## 2020-04-03 NOTE — TELEPHONE ENCOUNTER
To Dr. Viola Cortés---    Please advise.  Per earlier in the TE:  \"Pt fell again yesterday  Right elbow skin tear  Right upper back bruising  He is very weak today  Urine checked on 3/30 was negative  Finished antibiotic yesterday for lung infiltration     Need to re

## 2020-04-03 NOTE — TELEPHONE ENCOUNTER
Telephone call to nurse caring for patient at [facility]. Patient is quite weak. Apparently he fell again today. The the [facility] psychiatry nurse has put a hold on the patient's Wellbutrin for 7 days.   Patient's recent urinalysis and urine cult

## 2020-04-03 NOTE — TELEPHONE ENCOUNTER
Pt fell again yesterday  Right elbow skin tear  Right upper back bruising  He is very weak today  Urine checked on 3/30 was negative  Finished antibiotic yesterday for lung infiltration    Need to re certify patient for nursing, physical & occupational the

## 2020-04-03 NOTE — TELEPHONE ENCOUNTER
Damon calling to report the same fall please call Haydee Edwards [phone] pt also have a fever of 100.3 need order for tylenol.

## 2020-04-06 ENCOUNTER — TELEPHONE (OUTPATIENT)
Dept: INTERNAL MEDICINE CLINIC | Facility: CLINIC | Age: 80
End: 2020-04-06

## 2020-04-06 NOTE — TELEPHONE ENCOUNTER
MINERVA Burnett with [facility], states pt's BP was not taken at the time isosorbide was scheduled to be given, pt does not have any current cardiac complaints (no chest pain, palpitations). Labs placed in MD inbox on desk to review.    [facility] states the af

## 2020-04-06 NOTE — TELEPHONE ENCOUNTER
Telephone call to [facility] regarding Mr. Hailee Sr. I spoke to the afternoon nurse who felt that Mr. Eliana Millard is doing better. Patient's chest x-ray appears the same.   There is either a infiltrate with pleural effusion or pleural thickeni

## 2020-04-06 NOTE — TELEPHONE ENCOUNTER
Jamarcus Aldana from [facility] calling to inform Dr. Heraclio Medrano that Isosorbide Mononitrate was not given today. Was out of medication. Pharmacy will be delivering tomorrow. Labs will be faxed over too.      Best number to contact Jamarcus Aldana from [facility] at [phone]

## 2020-04-10 ENCOUNTER — TELEPHONE (OUTPATIENT)
Dept: INTERNAL MEDICINE CLINIC | Facility: CLINIC | Age: 80
End: 2020-04-10

## 2020-04-10 DIAGNOSIS — Z79.01 ANTICOAGULATED ON COUMADIN: ICD-10-CM

## 2020-04-17 ENCOUNTER — TELEPHONE (OUTPATIENT)
Dept: INTERNAL MEDICINE CLINIC | Facility: CLINIC | Age: 80
End: 2020-04-17

## 2020-04-17 NOTE — TELEPHONE ENCOUNTER
Telephone call to patient and situation discussed. Patient is stable at this time. Patient is having compression stockings placed on a daily basis. Nurse will continue to observe patient. She will call back if patient has more problems.

## 2020-04-17 NOTE — TELEPHONE ENCOUNTER
Pt has left lower extremity edema +1 pitting  No other symptoms, no cough, no shortness of breath, lungs are clear  /80  Pt is on Torsemide, alternates 10MG/20MG every morning  Also takes Spironolactone 25MG every morning  Lida Elkins will continue to mo

## 2020-04-20 NOTE — TELEPHONE ENCOUNTER
Requesting orders for compression stockings  Pt still presents with left foot edema +1    Please fax order to [phone]    Request  from   Nazia from [facility]   Ph #[phone]

## 2020-04-20 NOTE — TELEPHONE ENCOUNTER
RX faxed to [address] [facility] Jasmyne Pacheco at [phone]- confirmation received , copy into weekly folder , original to scanning

## 2020-04-20 NOTE — TELEPHONE ENCOUNTER
Telephone call to visiting nurse, Vale Jansen, concerning patient and leg swelling involving his left foot. She is asking for venous compression stockings. I did check with Vale Jansen to see what dosage of diuretic Mr. Miladis Samaniego is currently taking.   Currently he i

## 2020-04-21 NOTE — TELEPHONE ENCOUNTER
Please advise - to DR. PORTER    RX for compression stockings was faxed yesterday - she is talking about the medication( torsemide to 20 mg daily)

## 2020-04-21 NOTE — TELEPHONE ENCOUNTER
Nicki Aguero / Kat [address] called to follow up on prescription Dr Isabel Zavaleta  spoke to [facility] at [address] [facility]  [address] the prescription sent electronically or by fax to [facility]  in [facility]  fax # [phone]

## 2020-04-22 RX ORDER — TORSEMIDE 20 MG/1
20 TABLET ORAL DAILY
Qty: 90 TABLET | Refills: 3 | Status: SHIPPED | OUTPATIENT
Start: 2020-04-22 | End: 2021-01-01

## 2020-04-22 NOTE — TELEPHONE ENCOUNTER
Prescription for the patient's torsemide medication faxed to [phone] as requested. Also notified Meghan Crystal at [facility] that this was done.

## 2020-04-22 NOTE — TELEPHONE ENCOUNTER
Noted.  I have printed out a new prescription for the patient's torsemide medication as requested. I have left the new prescription on my nurse's desk. Please fax this to [facility] as requested.   Also, please notify [facility] that

## 2020-04-24 ENCOUNTER — TELEPHONE (OUTPATIENT)
Dept: INTERNAL MEDICINE CLINIC | Facility: CLINIC | Age: 80
End: 2020-04-24

## 2020-04-24 DIAGNOSIS — Z79.01 ANTICOAGULATED ON COUMADIN: ICD-10-CM

## 2020-05-04 ENCOUNTER — TELEPHONE (OUTPATIENT)
Dept: INTERNAL MEDICINE CLINIC | Facility: CLINIC | Age: 80
End: 2020-05-04

## 2020-05-04 NOTE — TELEPHONE ENCOUNTER
Signed orders faxed to [facility] @ [phone] and [facility] @ [phone], conformation received.

## 2020-05-04 NOTE — TELEPHONE ENCOUNTER
Nazia from [facility] is calling checking on status of order faxed for Warfarin. Patient is at [facility]  Warfarin changed by [employer] on 4/10 to   4 mg Mon and Friday  3 mg other days.   4/24 [employer] left same dosage    Asking for this order to also be faxe

## 2020-05-04 NOTE — TELEPHONE ENCOUNTER
Noted.  I have signed the orders as requested. Please fax them back as requested. I will route this to nursing.   Thank you!!

## 2020-05-04 NOTE — TELEPHONE ENCOUNTER
[facility] called to verify quantity on Rx coumadin. Located fax. And verbally confirmed quantity on orders faxed.  Coumadin 4mg #9 with 11 RF and Coumadin 3mg #21 with 11 RF

## 2020-05-04 NOTE — TELEPHONE ENCOUNTER
[address] will be faxing coumadin dosing orders from 4/10. MD to sign and fax back, plus fax to [facility] at [phone]. Pt running low on coumadin, therefore signed order is needed asap.     Next scheduled INR is this Friday, patient wll then be di

## 2020-05-08 ENCOUNTER — TELEPHONE (OUTPATIENT)
Dept: INTERNAL MEDICINE CLINIC | Facility: CLINIC | Age: 80
End: 2020-05-08

## 2020-05-08 DIAGNOSIS — Z79.01 ANTICOAGULATED ON COUMADIN: ICD-10-CM

## 2020-05-08 LAB — INR: 2 (ref 0.8–1.2)

## 2020-05-08 NOTE — TELEPHONE ENCOUNTER
today's coumadin results    PT 24.0    INR  2.0    Takes warfarin 4 mg on Monday/Fridays  3mg all other days  Needs warfarin orders faxed to [employer]  Fax# [phone]  Even if the dose remains the same new order is required       Callback # [phone]  Pt will be discharged today from home health  No longer   Left foot edema managed w/compression hose & medication

## 2020-05-19 ENCOUNTER — MED REC SCAN ONLY (OUTPATIENT)
Dept: INTERNAL MEDICINE CLINIC | Facility: CLINIC | Age: 80
End: 2020-05-19

## 2020-06-04 ENCOUNTER — TELEPHONE (OUTPATIENT)
Dept: INTERNAL MEDICINE CLINIC | Facility: CLINIC | Age: 80
End: 2020-06-04

## 2020-06-04 DIAGNOSIS — Z79.01 ANTICOAGULATED ON COUMADIN: ICD-10-CM

## 2020-06-04 NOTE — TELEPHONE ENCOUNTER
From other TT  Please call Ivan/[facility]  Requesting Coumadin orders  Faxed PT/INR results yesterday  Please call or fax orders  DF#[phone]  Tasked to Coumadin      See [facility] note ;  Orders faxed to [employer] [facility] [employer]

## 2020-06-04 NOTE — TELEPHONE ENCOUNTER
Please call [facility]  Requesting Coumadin orders  Faxed PT/INR results yesterday  Please call or fax orders  [facility]#[phone]  Tasked to Coumadin

## 2020-06-23 ENCOUNTER — APPOINTMENT (OUTPATIENT)
Dept: CARDIOLOGY | Age: 80
End: 2020-06-23
Attending: INTERNAL MEDICINE

## 2020-06-25 ENCOUNTER — TELEPHONE (OUTPATIENT)
Dept: INTERNAL MEDICINE CLINIC | Facility: CLINIC | Age: 80
End: 2020-06-25

## 2020-06-25 RX ORDER — ATORVASTATIN CALCIUM 20 MG/1
20 TABLET, FILM COATED ORAL NIGHTLY
Qty: 90 TABLET | Refills: 3 | Status: SHIPPED | OUTPATIENT
Start: 2020-06-25 | End: 2021-01-01

## 2020-06-25 NOTE — TELEPHONE ENCOUNTER
To Dr. Nancy Diamond -  Rx last filled here 2017. OK to refill?   Not sure who has been filling this - outside cardiologist?

## 2020-06-26 NOTE — TELEPHONE ENCOUNTER
Noted.  I have refilled patient's Lipitor prescription as requested. I have sent it to [facility] as recommended by the chart.   Thank you!!

## 2020-07-01 ENCOUNTER — TELEPHONE (OUTPATIENT)
Dept: INTERNAL MEDICINE CLINIC | Facility: CLINIC | Age: 80
End: 2020-07-01

## 2020-07-01 DIAGNOSIS — Z79.01 ANTICOAGULATED ON COUMADIN: ICD-10-CM

## 2020-07-01 LAB — INR: 1.49 (ref 0.8–1.2)

## 2020-07-01 NOTE — TELEPHONE ENCOUNTER
Ivan/[employer] requesting Coumadin orders for pt  PT 15.4  INR 1.49  Dosage 4MG Coumadin Monday and Friday  3MG all other days    [facility] requesting refill for Coumadin   Please send escript Qty 30     Tasked to Coumadin

## 2020-07-16 ENCOUNTER — TELEPHONE (OUTPATIENT)
Dept: INTERNAL MEDICINE CLINIC | Facility: CLINIC | Age: 80
End: 2020-07-16

## 2020-07-16 DIAGNOSIS — Z79.01 ANTICOAGULATED ON COUMADIN: ICD-10-CM

## 2020-07-16 NOTE — TELEPHONE ENCOUNTER
Noted. Please fax back my order for the patient have a pro time INR done tomorrow as requested. The order also notes that the patient should continue the same dosage of Coumadin.   Please fax this back to the fax number as requested at [facility]

## 2020-07-16 NOTE — TELEPHONE ENCOUNTER
I spoke with Gala Dwyer at [facility] and she says the lab was unable to do the INR today. They are asking for an order for INR be faxed to [phone].  Gala Dwyer also asks if Dr. Shira Hartman will write that the patient should continue the same dose of coumadin for t

## 2020-07-16 NOTE — TELEPHONE ENCOUNTER
[address] @ [facility] Anam Wong 3 in [place]:  Lab was not able to complete the PT INR today. Requesting an order for tomorrow.      Best call back: [phone]

## 2020-07-17 NOTE — TELEPHONE ENCOUNTER
Maylin Carrera called from [facility]  PT 18.6  INR 1.83  Dosage 3MG 5x per week Sat, Sun, Tues, Wed, Thurs  4MG Mon and Friday  Order can be faxed as below with signature  QWV#[phone]    Signed order to include refill for Coumadin    Tasked to Coumadin

## 2020-07-22 LAB — INR: 2.45 (ref 0.8–1.2)

## 2020-07-24 RX ORDER — WARFARIN SODIUM 1 MG/1
4 TABLET ORAL SEE ADMIN INSTRUCTIONS
Qty: 120 TABLET | Refills: 0 | Status: SHIPPED | OUTPATIENT
Start: 2020-07-24 | End: 2020-11-05

## 2020-07-24 NOTE — TELEPHONE ENCOUNTER
Prescription for Coumadin should be sent to [facility] [place] of [address]  Tasked to Cecilia Finn

## 2020-07-28 ENCOUNTER — TELEPHONE (OUTPATIENT)
Dept: INTERNAL MEDICINE CLINIC | Facility: CLINIC | Age: 80
End: 2020-07-28

## 2020-07-28 NOTE — TELEPHONE ENCOUNTER
order for wound care signed and faxed back to [facility] @ [phone], conformation received. Original sent to scan.

## 2020-08-05 ENCOUNTER — ANTI-COAG VISIT (OUTPATIENT)
Dept: INTERNAL MEDICINE CLINIC | Facility: CLINIC | Age: 80
End: 2020-08-05

## 2020-08-05 ENCOUNTER — TELEPHONE (OUTPATIENT)
Dept: INTERNAL MEDICINE CLINIC | Facility: CLINIC | Age: 80
End: 2020-08-05

## 2020-08-05 DIAGNOSIS — Z79.01 ANTICOAGULATED ON COUMADIN: ICD-10-CM

## 2020-08-05 DIAGNOSIS — I42.9 CARDIOMYOPATHY, UNSPECIFIED TYPE (HCC): ICD-10-CM

## 2020-08-05 LAB — INR: 2.6 (ref 0.8–1.2)

## 2020-08-05 NOTE — TELEPHONE ENCOUNTER
Chava from [facility] is calling with results   PT 26.6   INR   2.66   Ph. # [phone]    He needs a new order also  Fax.  # [phone]  if coumadin is ordered sed Rx to [address] [place] [address]  Routed high to [employer]

## 2020-08-19 ENCOUNTER — TELEPHONE (OUTPATIENT)
Dept: INTERNAL MEDICINE CLINIC | Facility: CLINIC | Age: 80
End: 2020-08-19

## 2020-08-19 DIAGNOSIS — Z79.01 ANTICOAGULATED ON COUMADIN: ICD-10-CM

## 2020-08-19 LAB — INR: 1.95 (ref 0.8–1.2)

## 2020-08-19 NOTE — TELEPHONE ENCOUNTER
Denver Collado / Kody Wong 3 calling with PT/INR results    PT 19.8  INR 1.95    Coumadin 4 mg daily

## 2020-08-19 NOTE — TELEPHONE ENCOUNTER
Please advise INR goal 2-3 - ok to wait for Estela Dudley tomorrow ? If so we need to route to Estela Dudley thanks - to DR. PORTER

## 2020-08-20 NOTE — TELEPHONE ENCOUNTER
Noted.  I will forward this message to Rafa Rico for her to handle when she returns on Thursday.   Thank you!!

## 2020-08-25 ENCOUNTER — TELEPHONE (OUTPATIENT)
Dept: INTERNAL MEDICINE CLINIC | Facility: CLINIC | Age: 80
End: 2020-08-25

## 2020-08-25 ENCOUNTER — PATIENT MESSAGE (OUTPATIENT)
Dept: INTERNAL MEDICINE CLINIC | Facility: CLINIC | Age: 80
End: 2020-08-25

## 2020-08-25 NOTE — TELEPHONE ENCOUNTER
From: Etienne Jackson  To: Amado Hemphill MD  Sent: 8/25/2020 4:04 PM CDT  Subject: Prescription Question    Hi Dr. Tamara Boyce,    Long story, I'll try to make short.  A Psychiatrist had seen my dad at his facility and took him off prozac and started him

## 2020-08-25 NOTE — TELEPHONE ENCOUNTER
Meghan Jeffers   to Margarita Howard MD            8/25/20 4:04 PM   Hi Dr. Nancy Diamond,     Long story, I'll try to make short.  A Psychiatrist had seen my dad at his facility and took him off prozac and started him on welbutrin.  Dad started getting dizz

## 2020-08-26 NOTE — TELEPHONE ENCOUNTER
Dr. Buck Hardwick prescription faxed to [facility] at [phone]. Fax confirmation received. Copy to scanning.

## 2020-08-26 NOTE — TELEPHONE ENCOUNTER
Telephone call to [facility] and also to patient's daughter, Mi Steele. Jacy states that she and the family feel the patient is depressed and would be best served by going back on an antidepressant such as Prozac.   Patient has been on this for s

## 2020-09-04 ENCOUNTER — MED REC SCAN ONLY (OUTPATIENT)
Dept: INTERNAL MEDICINE CLINIC | Facility: CLINIC | Age: 80
End: 2020-09-04

## 2020-09-09 ENCOUNTER — TELEPHONE (OUTPATIENT)
Dept: INTERNAL MEDICINE CLINIC | Facility: CLINIC | Age: 80
End: 2020-09-09

## 2020-09-09 DIAGNOSIS — Z79.01 ANTICOAGULATED ON COUMADIN: ICD-10-CM

## 2020-09-09 LAB — INR: 2.02 (ref 0.8–1.2)

## 2020-09-09 NOTE — TELEPHONE ENCOUNTER
Fax received from [place] with PT/INR results. Copy placed on Dr. William Foster and Hanna alonso.

## 2020-09-09 NOTE — TELEPHONE ENCOUNTER
[facility] from [facility] is calling back looking for new orders today. Routed to Dr. Lionel Granado- can you please help with this? Please advise. Thank you!

## 2020-09-09 NOTE — TELEPHONE ENCOUNTER
[facility] called from [facility]  PT 20.4  INR 2.02  Dosage: 4MG Coumadin at bedtime  Please send order and new order for next PT/INR for Tuesday or Thursday  Order should be sent to [facility]#[phone]  Tasked to Coumadin

## 2020-09-10 ENCOUNTER — TELEPHONE (OUTPATIENT)
Dept: INTERNAL MEDICINE CLINIC | Facility: CLINIC | Age: 80
End: 2020-09-10

## 2020-09-23 ENCOUNTER — TELEPHONE (OUTPATIENT)
Dept: INTERNAL MEDICINE CLINIC | Facility: CLINIC | Age: 80
End: 2020-09-23

## 2020-09-23 NOTE — TELEPHONE ENCOUNTER
Cheo Guadalupe from [facility] is calling to report a patient fall no injuries, bruising or skin tear ph.  # [phone]   Routed to clinical

## 2020-09-30 ENCOUNTER — TELEPHONE (OUTPATIENT)
Dept: INTERNAL MEDICINE CLINIC | Facility: CLINIC | Age: 80
End: 2020-09-30

## 2020-09-30 NOTE — TELEPHONE ENCOUNTER
María Lee from [facility] is [employer] a blood vessel in his right eye his sclera is red he has no bleeding, pain, or drainage ph.  # [phone]   Routed to clinical

## 2020-10-02 NOTE — TELEPHONE ENCOUNTER
Telephone call to [facility] regarding patient. Situation discussed with personally answered the phone. Patient has no pain in his eye. His vision is normal.  It sounds as though patient has a subconjunctival hemorrhage which should gradually improve.   The

## 2020-10-05 ENCOUNTER — MED REC SCAN ONLY (OUTPATIENT)
Dept: INTERNAL MEDICINE CLINIC | Facility: CLINIC | Age: 80
End: 2020-10-05

## 2020-10-14 ENCOUNTER — TELEPHONE (OUTPATIENT)
Dept: INTERNAL MEDICINE CLINIC | Facility: CLINIC | Age: 80
End: 2020-10-14

## 2020-10-14 DIAGNOSIS — Z79.01 ANTICOAGULATED ON COUMADIN: ICD-10-CM

## 2020-10-15 NOTE — TELEPHONE ENCOUNTER
Telephone call to Mikey Bosworth at [facility]. Patient's INR and pro time were noted. I told Mikey Bosworth to continue the same dose of Coumadin at this time.   I will have Laura Aguirre, from our Coumadin clinic, call her tomorrow to make any adjustments in the

## 2020-11-04 ENCOUNTER — TELEPHONE (OUTPATIENT)
Dept: INTERNAL MEDICINE CLINIC | Facility: CLINIC | Age: 80
End: 2020-11-04

## 2020-11-04 DIAGNOSIS — Z51.81 ENCOUNTER FOR MONITORING COUMADIN THERAPY: Primary | ICD-10-CM

## 2020-11-04 DIAGNOSIS — Z79.01 ANTICOAGULATED ON COUMADIN: ICD-10-CM

## 2020-11-04 DIAGNOSIS — Z79.01 ENCOUNTER FOR MONITORING COUMADIN THERAPY: Primary | ICD-10-CM

## 2020-11-05 RX ORDER — WARFARIN SODIUM 1 MG/1
4 TABLET ORAL SEE ADMIN INSTRUCTIONS
Qty: 120 TABLET | Refills: 11 | Status: SHIPPED | OUTPATIENT
Start: 2020-11-05 | End: 2021-01-01

## 2020-11-05 NOTE — TELEPHONE ENCOUNTER
See [facility] note  Refill request is for a maintenance medication and has met the criteria specified in the Ambulatory Medication Refill Standing Order for eligibility, visits, laboratory, alerts and was sent to the requested pharmacy.     Requested Prescriptions

## 2020-11-05 NOTE — TELEPHONE ENCOUNTER
PT: 29.7  INR: 2.98    FAX: [phone]     Nereyda Lin RN Verbal given for Coumadin 4 mg for 2 weeks, 11/18/2020 repeat PT/INR as very close to upper limit    Script to [facility] for coumadin required.  Per chart review,   Order should be sent to [facility]

## 2020-11-20 NOTE — TELEPHONE ENCOUNTER
Telephone call to [place] and situation discussed. Patient apparently doing well at this time as noted by [facility]. Patient does not recall the events earlier this morning. This is obviously quite out of character for patient.   Unfortunately, with his serena
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
## 2020-11-20 NOTE — TELEPHONE ENCOUNTER
INR elevated from 11/18 although not in chart, and today INR 4.11;   Per RN at [facility],  ordered hold warfarin and daily INR  Confirmed  aware;  RN confirms pt has no bleeding;   Continue to hold warfarin and INR on Monday 11/23

## 2020-11-20 NOTE — TELEPHONE ENCOUNTER
I spoke with Erica Lilly RN. She says patient is doing ok. He is calm now. He is eating well. He doesn't have any complaints. He cannot recall anything that happened earlier. Invited her to call back with any questions or concerns.  To Dr. Chetan Gonzalez, please Vi Edwards

## 2020-11-24 NOTE — TELEPHONE ENCOUNTER
Laura Aguirre, received a page last night at 9 PM from [facility] at [phone]. INR was reported to be 1.57. Coumadin has been on hold due to elevated INR. I gave instructions to give Coumadin 2 mg last night.   Believe his anticoagulation is for atrial

## 2020-11-24 NOTE — TELEPHONE ENCOUNTER
Dominik Collazo / [facility] is calling for updated Coumadin orders    Please call [phone]

## 2020-11-24 NOTE — TELEPHONE ENCOUNTER
[employer]  [phone];   See [facility] note for INR 1.57  Thank you Dr.K mercado oncyari duty

## 2020-12-01 NOTE — TELEPHONE ENCOUNTER
Ariel Falcon / Jose Manuel Began called   Fax was not received  Please re send to fax# [phone]    Requests script is sent to [place] [facility] for coumadin    Also advise next test date   - and any change is dosage

## 2020-12-01 NOTE — TELEPHONE ENCOUNTER
Alfonzo Galloway / Clive Slice calling with results   Pt 25.4  INR 2.53  Coumadin 3 mg Monday Wednesday Friday and Saturday, 4 mg all other days    Phone [phone]    Tasked to coumadin high

## 2020-12-02 NOTE — TELEPHONE ENCOUNTER
Called Ru[facility] at [facility] and relayed [place] from [facility] sheet - continue same dose , next INR in 2-3 weeks -verbalized understanding

## 2020-12-02 NOTE — TELEPHONE ENCOUNTER
Charles pinto [facility] calling again, he did not receive any call back regarding the patient's INR results yesterday.

## 2021-01-01 ENCOUNTER — TELEPHONE (OUTPATIENT)
Dept: INTERNAL MEDICINE CLINIC | Facility: CLINIC | Age: 81
End: 2021-01-01

## 2021-01-01 ENCOUNTER — APPOINTMENT (OUTPATIENT)
Dept: CT IMAGING | Facility: HOSPITAL | Age: 81
End: 2021-01-01
Attending: EMERGENCY MEDICINE
Payer: MEDICARE

## 2021-01-01 ENCOUNTER — MED REC SCAN ONLY (OUTPATIENT)
Dept: INTERNAL MEDICINE CLINIC | Facility: CLINIC | Age: 81
End: 2021-01-01

## 2021-01-01 ENCOUNTER — APPOINTMENT (OUTPATIENT)
Dept: GENERAL RADIOLOGY | Facility: HOSPITAL | Age: 81
End: 2021-01-01
Attending: EMERGENCY MEDICINE
Payer: MEDICARE

## 2021-01-01 ENCOUNTER — HOSPITAL ENCOUNTER (OUTPATIENT)
Facility: HOSPITAL | Age: 81
Setting detail: OBSERVATION
Discharge: HOSPICE | End: 2021-01-01
Attending: EMERGENCY MEDICINE
Payer: MEDICARE

## 2021-01-01 VITALS
DIASTOLIC BLOOD PRESSURE: 78 MMHG | WEIGHT: 122 LBS | OXYGEN SATURATION: 96 % | SYSTOLIC BLOOD PRESSURE: 134 MMHG | HEART RATE: 70 BPM | RESPIRATION RATE: 18 BRPM | BODY MASS INDEX: 19 KG/M2 | TEMPERATURE: 98 F

## 2021-01-01 DIAGNOSIS — J44.9 CHRONIC OBSTRUCTIVE PULMONARY DISEASE, UNSPECIFIED COPD TYPE (HCC): Primary | ICD-10-CM

## 2021-01-01 DIAGNOSIS — N17.9 AKI (ACUTE KIDNEY INJURY) (HCC): ICD-10-CM

## 2021-01-01 DIAGNOSIS — Z79.01 ANTICOAGULATED ON COUMADIN: ICD-10-CM

## 2021-01-01 DIAGNOSIS — J90 PLEURAL EFFUSION: ICD-10-CM

## 2021-01-01 DIAGNOSIS — M54.9 BACK PAIN WITHOUT RADIATION: Primary | ICD-10-CM

## 2021-01-01 DIAGNOSIS — I42.9 CARDIOMYOPATHY, UNSPECIFIED TYPE (HCC): ICD-10-CM

## 2021-01-01 DIAGNOSIS — Z23 NEED FOR VACCINATION: ICD-10-CM

## 2021-01-01 LAB
ALBUMIN SERPL-MCNC: 2.7 G/DL (ref 3.4–5)
ANION GAP SERPL CALC-SCNC: 5 MMOL/L (ref 0–18)
ANION GAP SERPL CALC-SCNC: 5 MMOL/L (ref 0–18)
ANION GAP SERPL CALC-SCNC: 6 MMOL/L (ref 0–18)
ANION GAP SERPL CALC-SCNC: 7 MMOL/L (ref 0–18)
ANION GAP SERPL CALC-SCNC: 7 MMOL/L (ref 0–18)
BASOPHILS # BLD AUTO: 0.01 X10(3) UL (ref 0–0.2)
BASOPHILS # BLD AUTO: 0.01 X10(3) UL (ref 0–0.2)
BASOPHILS NFR BLD AUTO: 0.1 %
BASOPHILS NFR BLD AUTO: 0.1 %
BILIRUB UR QL: NEGATIVE
BUN BLD-MCNC: 52 MG/DL (ref 7–18)
BUN BLD-MCNC: 55 MG/DL (ref 7–18)
BUN BLD-MCNC: 61 MG/DL (ref 7–18)
BUN BLD-MCNC: 62 MG/DL (ref 7–18)
BUN BLD-MCNC: 63 MG/DL (ref 7–18)
BUN/CREAT SERPL: 27.9 (ref 10–20)
BUN/CREAT SERPL: 29.9 (ref 10–20)
BUN/CREAT SERPL: 30.4 (ref 10–20)
BUN/CREAT SERPL: 31.2 (ref 10–20)
BUN/CREAT SERPL: 35.2 (ref 10–20)
CALCIUM BLD-MCNC: 9.4 MG/DL (ref 8.5–10.1)
CALCIUM BLD-MCNC: 9.5 MG/DL (ref 8.5–10.1)
CALCIUM BLD-MCNC: 9.6 MG/DL (ref 8.5–10.1)
CALCIUM BLD-MCNC: 9.7 MG/DL (ref 8.5–10.1)
CALCIUM BLD-MCNC: 9.8 MG/DL (ref 8.5–10.1)
CHLORIDE SERPL-SCNC: 103 MMOL/L (ref 98–112)
CHLORIDE SERPL-SCNC: 104 MMOL/L (ref 98–112)
CHLORIDE SERPL-SCNC: 104 MMOL/L (ref 98–112)
CHLORIDE SERPL-SCNC: 105 MMOL/L (ref 98–112)
CHLORIDE SERPL-SCNC: 105 MMOL/L (ref 98–112)
CLARITY UR: CLEAR
CO2 SERPL-SCNC: 24 MMOL/L (ref 21–32)
CO2 SERPL-SCNC: 27 MMOL/L (ref 21–32)
CO2 SERPL-SCNC: 28 MMOL/L (ref 21–32)
CO2 SERPL-SCNC: 29 MMOL/L (ref 21–32)
CO2 SERPL-SCNC: 29 MMOL/L (ref 21–32)
COLOR UR: YELLOW
CREAT BLD-MCNC: 1.71 MG/DL
CREAT BLD-MCNC: 1.76 MG/DL
CREAT BLD-MCNC: 1.97 MG/DL
CREAT BLD-MCNC: 2.02 MG/DL
CREAT BLD-MCNC: 2.04 MG/DL
D DIMER PPP FEU-MCNC: 4.4 UG/ML FEU (ref ?–0.82)
DEPRECATED RDW RBC AUTO: 55.8 FL (ref 35.1–46.3)
DEPRECATED RDW RBC AUTO: 58.2 FL (ref 35.1–46.3)
DEPRECATED RDW RBC AUTO: 58.4 FL (ref 35.1–46.3)
DEPRECATED RDW RBC AUTO: 58.4 FL (ref 35.1–46.3)
DEPRECATED RDW RBC AUTO: 58.5 FL (ref 35.1–46.3)
EOSINOPHIL # BLD AUTO: 0.01 X10(3) UL (ref 0–0.7)
EOSINOPHIL # BLD AUTO: 0.06 X10(3) UL (ref 0–0.7)
EOSINOPHIL NFR BLD AUTO: 0.1 %
EOSINOPHIL NFR BLD AUTO: 0.7 %
ERYTHROCYTE [DISTWIDTH] IN BLOOD BY AUTOMATED COUNT: 15.9 % (ref 11–15)
ERYTHROCYTE [DISTWIDTH] IN BLOOD BY AUTOMATED COUNT: 16.2 % (ref 11–15)
ERYTHROCYTE [DISTWIDTH] IN BLOOD BY AUTOMATED COUNT: 16.3 % (ref 11–15)
ERYTHROCYTE [SEDIMENTATION RATE] IN BLOOD: 81 MM/HR
GLUCOSE BLD-MCNC: 88 MG/DL (ref 70–99)
GLUCOSE BLD-MCNC: 91 MG/DL (ref 70–99)
GLUCOSE BLD-MCNC: 95 MG/DL (ref 70–99)
GLUCOSE BLD-MCNC: 96 MG/DL (ref 70–99)
GLUCOSE BLD-MCNC: 99 MG/DL (ref 70–99)
GLUCOSE BLDC GLUCOMTR-MCNC: 94 MG/DL (ref 70–99)
GLUCOSE UR-MCNC: NEGATIVE MG/DL
HCT VFR BLD AUTO: 31.9 %
HCT VFR BLD AUTO: 32.3 %
HCT VFR BLD AUTO: 34.4 %
HCT VFR BLD AUTO: 35.7 %
HCT VFR BLD AUTO: 36.7 %
HGB BLD-MCNC: 10.2 G/DL
HGB BLD-MCNC: 10.5 G/DL
HGB BLD-MCNC: 10.9 G/DL
HGB BLD-MCNC: 11.6 G/DL
HGB BLD-MCNC: 12.1 G/DL
IMM GRANULOCYTES # BLD AUTO: 0.03 X10(3) UL (ref 0–1)
IMM GRANULOCYTES # BLD AUTO: 0.04 X10(3) UL (ref 0–1)
IMM GRANULOCYTES NFR BLD: 0.4 %
IMM GRANULOCYTES NFR BLD: 0.4 %
INR BLD: 1.8 (ref 0.9–1.2)
INR BLD: 2.68 (ref 0.9–1.2)
INR BLD: 3.33 (ref 0.9–1.2)
INR BLD: 3.49 (ref 0.9–1.2)
INR BLD: 4.61 (ref 0.9–1.2)
INR: 1.5 (ref 0.8–1.2)
INR: 1.6 (ref 0.8–1.2)
INR: 1.6 (ref 0.8–1.2)
INR: 1.68 (ref 0.8–1.2)
INR: 1.7 (ref 0.8–1.2)
INR: 1.8 (ref 0.8–1.2)
INR: 2.2 (ref 0.8–1.2)
INR: 2.5 (ref 0.8–1.2)
INR: 2.6 (ref 0.8–1.2)
INR: 3 (ref 0.8–1.2)
INR: 3.2 (ref 0.8–1.2)
INR: 3.3 (ref 0.8–1.2)
INR: 4.1 (ref 0.8–1.2)
KETONES UR-MCNC: NEGATIVE MG/DL
LEUKOCYTE ESTERASE UR QL STRIP.AUTO: NEGATIVE
LYMPHOCYTES # BLD AUTO: 0.23 X10(3) UL (ref 1–4)
LYMPHOCYTES # BLD AUTO: 0.26 X10(3) UL (ref 1–4)
LYMPHOCYTES NFR BLD AUTO: 2.2 %
LYMPHOCYTES NFR BLD AUTO: 3.1 %
MCH RBC QN AUTO: 31.3 PG (ref 26–34)
MCH RBC QN AUTO: 31.5 PG (ref 26–34)
MCH RBC QN AUTO: 31.8 PG (ref 26–34)
MCH RBC QN AUTO: 31.8 PG (ref 26–34)
MCH RBC QN AUTO: 32.1 PG (ref 26–34)
MCHC RBC AUTO-ENTMCNC: 31.7 G/DL (ref 31–37)
MCHC RBC AUTO-ENTMCNC: 32 G/DL (ref 31–37)
MCHC RBC AUTO-ENTMCNC: 32.5 G/DL (ref 31–37)
MCHC RBC AUTO-ENTMCNC: 32.5 G/DL (ref 31–37)
MCHC RBC AUTO-ENTMCNC: 33 G/DL (ref 31–37)
MCV RBC AUTO: 96.3 FL
MCV RBC AUTO: 97.9 FL
MCV RBC AUTO: 98.5 FL
MCV RBC AUTO: 98.9 FL
MCV RBC AUTO: 98.9 FL
MONOCYTES # BLD AUTO: 0.75 X10(3) UL (ref 0.1–1)
MONOCYTES # BLD AUTO: 0.9 X10(3) UL (ref 0.1–1)
MONOCYTES NFR BLD AUTO: 8.6 %
MONOCYTES NFR BLD AUTO: 8.9 %
NEUTROPHILS # BLD AUTO: 7.3 X10 (3) UL (ref 1.5–7.7)
NEUTROPHILS # BLD AUTO: 7.3 X10(3) UL (ref 1.5–7.7)
NEUTROPHILS # BLD AUTO: 9.26 X10 (3) UL (ref 1.5–7.7)
NEUTROPHILS # BLD AUTO: 9.26 X10(3) UL (ref 1.5–7.7)
NEUTROPHILS NFR BLD AUTO: 86.8 %
NEUTROPHILS NFR BLD AUTO: 88.6 %
NITRITE UR QL STRIP.AUTO: NEGATIVE
OSMOLALITY SERPL CALC.SUM OF ELEC: 296 MOSM/KG (ref 275–295)
OSMOLALITY SERPL CALC.SUM OF ELEC: 302 MOSM/KG (ref 275–295)
OSMOLALITY SERPL CALC.SUM OF ELEC: 303 MOSM/KG (ref 275–295)
PH UR: 5 [PH] (ref 5–8)
PHOSPHATE SERPL-MCNC: 3.1 MG/DL (ref 2.5–4.9)
PLATELET # BLD AUTO: 107 10(3)UL (ref 150–450)
PLATELET # BLD AUTO: 114 10(3)UL (ref 150–450)
PLATELET # BLD AUTO: 118 10(3)UL (ref 150–450)
PLATELET # BLD AUTO: 119 10(3)UL (ref 150–450)
PLATELET # BLD AUTO: 122 10(3)UL (ref 150–450)
POTASSIUM SERPL-SCNC: 3.9 MMOL/L (ref 3.5–5.1)
POTASSIUM SERPL-SCNC: 4.2 MMOL/L (ref 3.5–5.1)
POTASSIUM SERPL-SCNC: 4.3 MMOL/L (ref 3.5–5.1)
POTASSIUM SERPL-SCNC: 4.5 MMOL/L (ref 3.5–5.1)
POTASSIUM SERPL-SCNC: 4.8 MMOL/L (ref 3.5–5.1)
PROT UR-MCNC: 30 MG/DL
PROTHROMBIN TIME: 20.6 SECONDS (ref 11.8–14.5)
PROTHROMBIN TIME: 28.1 SECONDS (ref 11.8–14.5)
PROTHROMBIN TIME: 33.3 SECONDS (ref 11.8–14.5)
PROTHROMBIN TIME: 34.5 SECONDS (ref 11.8–14.5)
PROTHROMBIN TIME: 42.9 SECONDS (ref 11.8–14.5)
RBC # BLD AUTO: 3.24 X10(6)UL
RBC # BLD AUTO: 3.3 X10(6)UL
RBC # BLD AUTO: 3.48 X10(6)UL
RBC # BLD AUTO: 3.61 X10(6)UL
RBC # BLD AUTO: 3.81 X10(6)UL
RBC #/AREA URNS AUTO: 4 /HPF
SARS-COV-2 RNA RESP QL NAA+PROBE: NOT DETECTED
SODIUM SERPL-SCNC: 136 MMOL/L (ref 136–145)
SODIUM SERPL-SCNC: 137 MMOL/L (ref 136–145)
SODIUM SERPL-SCNC: 138 MMOL/L (ref 136–145)
SODIUM SERPL-SCNC: 138 MMOL/L (ref 136–145)
SODIUM SERPL-SCNC: 139 MMOL/L (ref 136–145)
SP GR UR STRIP: 1.01 (ref 1–1.03)
TROPONIN I SERPL-MCNC: <0.045 NG/ML (ref ?–0.04)
UROBILINOGEN UR STRIP-ACNC: <2
WBC # BLD AUTO: 10.5 X10(3) UL (ref 4–11)
WBC # BLD AUTO: 7.1 X10(3) UL (ref 4–11)
WBC # BLD AUTO: 7.2 X10(3) UL (ref 4–11)
WBC # BLD AUTO: 8.4 X10(3) UL (ref 4–11)
WBC # BLD AUTO: 9.3 X10(3) UL (ref 4–11)
WBC #/AREA URNS AUTO: 1 /HPF

## 2021-01-01 PROCEDURE — 71260 CT THORAX DX C+: CPT | Performed by: EMERGENCY MEDICINE

## 2021-01-01 PROCEDURE — 99214 OFFICE O/P EST MOD 30 MIN: CPT | Performed by: INTERNAL MEDICINE

## 2021-01-01 PROCEDURE — 0T9B70Z DRAINAGE OF BLADDER WITH DRAINAGE DEVICE, VIA NATURAL OR ARTIFICIAL OPENING: ICD-10-PCS | Performed by: INTERNAL MEDICINE

## 2021-01-01 PROCEDURE — 99226 SUBSEQUENT OBSERVATION CARE: CPT | Performed by: HOSPITALIST

## 2021-01-01 PROCEDURE — 71046 X-RAY EXAM CHEST 2 VIEWS: CPT | Performed by: EMERGENCY MEDICINE

## 2021-01-01 PROCEDURE — 99205 OFFICE O/P NEW HI 60 MIN: CPT | Performed by: NURSE PRACTITIONER

## 2021-01-01 PROCEDURE — 99220 INITIAL OBSERVATION CARE,LEVL III: CPT | Performed by: INTERNAL MEDICINE

## 2021-01-01 PROCEDURE — 99214 OFFICE O/P EST MOD 30 MIN: CPT | Performed by: NURSE PRACTITIONER

## 2021-01-01 PROCEDURE — 99497 ADVNCD CARE PLAN 30 MIN: CPT | Performed by: INTERNAL MEDICINE

## 2021-01-01 PROCEDURE — 99217 OBSERVATION CARE DISCHARGE: CPT | Performed by: HOSPITALIST

## 2021-01-01 RX ORDER — ISOSORBIDE MONONITRATE 30 MG/1
60 TABLET, EXTENDED RELEASE ORAL DAILY
Status: DISCONTINUED | OUTPATIENT
Start: 2021-01-01 | End: 2021-01-01

## 2021-01-01 RX ORDER — ACETAMINOPHEN 325 MG/1
650 TABLET ORAL EVERY 4 HOURS PRN
Status: DISCONTINUED | OUTPATIENT
Start: 2021-01-01 | End: 2021-01-01

## 2021-01-01 RX ORDER — HYDROCODONE BITARTRATE AND ACETAMINOPHEN 5; 325 MG/1; MG/1
2 TABLET ORAL EVERY 4 HOURS PRN
Status: DISCONTINUED | OUTPATIENT
Start: 2021-01-01 | End: 2021-01-01

## 2021-01-01 RX ORDER — WARFARIN SODIUM 2 MG/1
4 TABLET ORAL NIGHTLY
Status: DISCONTINUED | OUTPATIENT
Start: 2021-01-01 | End: 2021-01-01

## 2021-01-01 RX ORDER — PREDNISONE 1 MG/1
2 TABLET ORAL
Qty: 180 TABLET | Refills: 0 | Status: SHIPPED | OUTPATIENT
Start: 2021-01-01 | End: 2021-01-01

## 2021-01-01 RX ORDER — WARFARIN SODIUM 4 MG/1
4 TABLET ORAL DAILY
Qty: 30 TABLET | Refills: 11 | Status: SHIPPED | OUTPATIENT
Start: 2021-01-01

## 2021-01-01 RX ORDER — MORPHINE SULFATE 4 MG/ML
INJECTION, SOLUTION INTRAMUSCULAR; INTRAVENOUS
Status: COMPLETED
Start: 2021-01-01 | End: 2021-01-01

## 2021-01-01 RX ORDER — PREDNISONE 1 MG/1
TABLET ORAL
Qty: 60 TABLET | Refills: 10 | OUTPATIENT
Start: 2021-01-01

## 2021-01-01 RX ORDER — PREDNISONE 1 MG/1
2 TABLET ORAL
Qty: 180 TABLET | Refills: 3 | Status: SHIPPED | OUTPATIENT
Start: 2021-01-01

## 2021-01-01 RX ORDER — HYDROCODONE BITARTRATE AND ACETAMINOPHEN 5; 325 MG/1; MG/1
1 TABLET ORAL EVERY 6 HOURS PRN
Status: DISCONTINUED | OUTPATIENT
Start: 2021-01-01 | End: 2021-01-01

## 2021-01-01 RX ORDER — WARFARIN SODIUM 3 MG/1
TABLET ORAL
Qty: 4 TABLET | Refills: 0 | OUTPATIENT
Start: 2021-01-01

## 2021-01-01 RX ORDER — HYDRALAZINE HYDROCHLORIDE 25 MG/1
25 TABLET, FILM COATED ORAL 2 TIMES DAILY
Status: DISCONTINUED | OUTPATIENT
Start: 2021-01-01 | End: 2021-01-01

## 2021-01-01 RX ORDER — PREDNISONE 1 MG/1
2 TABLET ORAL
Status: DISCONTINUED | OUTPATIENT
Start: 2021-01-01 | End: 2021-01-01

## 2021-01-01 RX ORDER — CARVEDILOL 25 MG/1
12.5 TABLET ORAL 2 TIMES DAILY WITH MEALS
Qty: 60 TABLET | Refills: 0 | Status: SHIPPED | OUTPATIENT
Start: 2021-01-01 | End: 2021-01-01

## 2021-01-01 RX ORDER — FLUOXETINE HYDROCHLORIDE 20 MG/1
20 CAPSULE ORAL DAILY
Status: DISCONTINUED | OUTPATIENT
Start: 2021-01-01 | End: 2021-01-01

## 2021-01-01 RX ORDER — WARFARIN SODIUM 1 MG/1
TABLET ORAL
Qty: 120 TABLET | Refills: 11 | Status: SHIPPED | OUTPATIENT
Start: 2021-01-01 | End: 2021-01-01 | Stop reason: DRUGHIGH

## 2021-01-01 RX ORDER — FOLIC ACID 1 MG/1
1 TABLET ORAL DAILY
Status: DISCONTINUED | OUTPATIENT
Start: 2021-01-01 | End: 2021-01-01

## 2021-01-01 RX ORDER — FUROSEMIDE 10 MG/ML
20 INJECTION INTRAMUSCULAR; INTRAVENOUS ONCE
Status: COMPLETED | OUTPATIENT
Start: 2021-01-01 | End: 2021-01-01

## 2021-01-01 RX ORDER — HYDROCODONE BITARTRATE AND ACETAMINOPHEN 5; 325 MG/1; MG/1
1 TABLET ORAL EVERY 4 HOURS PRN
Status: DISCONTINUED | OUTPATIENT
Start: 2021-01-01 | End: 2021-01-01

## 2021-01-01 RX ORDER — WARFARIN SODIUM 4 MG/1
TABLET ORAL
Refills: 0 | OUTPATIENT
Start: 2021-01-01

## 2021-01-01 RX ORDER — FLUOXETINE 10 MG/1
CAPSULE ORAL
Qty: 30 CAPSULE | Refills: 11 | Status: SHIPPED | OUTPATIENT
Start: 2021-01-01

## 2021-01-01 RX ORDER — CEPHALEXIN 500 MG/1
500 CAPSULE ORAL 4 TIMES DAILY
Qty: 40 CAPSULE | Refills: 0 | Status: SHIPPED
Start: 2021-01-01 | End: 2021-01-01

## 2021-01-01 RX ORDER — ATORVASTATIN CALCIUM 20 MG/1
20 TABLET, FILM COATED ORAL NIGHTLY
Status: DISCONTINUED | OUTPATIENT
Start: 2021-01-01 | End: 2021-01-01

## 2021-01-01 RX ORDER — SODIUM CHLORIDE 9 MG/ML
INJECTION, SOLUTION INTRAVENOUS CONTINUOUS
Status: DISCONTINUED | OUTPATIENT
Start: 2021-01-01 | End: 2021-01-01

## 2021-01-01 RX ORDER — ACETAMINOPHEN 500 MG
1000 TABLET ORAL ONCE
Status: COMPLETED | OUTPATIENT
Start: 2021-01-01 | End: 2021-01-01

## 2021-01-01 RX ORDER — CARVEDILOL 25 MG/1
12.5 TABLET ORAL 2 TIMES DAILY WITH MEALS
Qty: 60 TABLET | Refills: 0 | Status: SHIPPED | OUTPATIENT
Start: 2021-01-01

## 2021-01-01 RX ORDER — ONDANSETRON 2 MG/ML
4 INJECTION INTRAMUSCULAR; INTRAVENOUS EVERY 6 HOURS PRN
Status: DISCONTINUED | OUTPATIENT
Start: 2021-01-01 | End: 2021-01-01

## 2021-01-01 RX ORDER — FINASTERIDE 5 MG/1
5 TABLET, FILM COATED ORAL DAILY
Qty: 90 TABLET | Refills: 0 | Status: CANCELLED | OUTPATIENT
Start: 2021-01-01

## 2021-01-01 RX ORDER — HYDROCODONE BITARTRATE AND ACETAMINOPHEN 5; 325 MG/1; MG/1
2 TABLET ORAL EVERY 6 HOURS PRN
Status: DISCONTINUED | OUTPATIENT
Start: 2021-01-01 | End: 2021-01-01

## 2021-01-01 RX ORDER — SPIRONOLACTONE 25 MG/1
25 TABLET ORAL DAILY
Status: DISCONTINUED | OUTPATIENT
Start: 2021-01-01 | End: 2021-01-01

## 2021-01-01 RX ORDER — CARVEDILOL 25 MG/1
25 TABLET ORAL 2 TIMES DAILY WITH MEALS
Qty: 1 TABLET | Refills: 0 | Status: CANCELLED | OUTPATIENT
Start: 2021-01-01

## 2021-01-01 RX ORDER — ATORVASTATIN CALCIUM 20 MG/1
TABLET, FILM COATED ORAL
Qty: 30 TABLET | Refills: 11 | Status: SHIPPED | OUTPATIENT
Start: 2021-01-01

## 2021-01-01 RX ORDER — WARFARIN SODIUM 4 MG/1
4 TABLET ORAL DAILY
Qty: 30 TABLET | Refills: 0 | Status: SHIPPED | OUTPATIENT
Start: 2021-01-01 | End: 2021-01-01

## 2021-01-01 RX ORDER — WARFARIN SODIUM 1 MG/1
TABLET ORAL
Qty: 120 TABLET | Refills: 11 | Status: SHIPPED | OUTPATIENT
Start: 2021-01-01 | End: 2021-01-01

## 2021-01-01 RX ORDER — CARVEDILOL 25 MG/1
25 TABLET ORAL 2 TIMES DAILY WITH MEALS
Status: DISCONTINUED | OUTPATIENT
Start: 2021-01-01 | End: 2021-01-01

## 2021-01-01 RX ORDER — ISOSORBIDE MONONITRATE 30 MG/1
60 TABLET, EXTENDED RELEASE ORAL DAILY
Qty: 60 TABLET | Refills: 0 | Status: CANCELLED | OUTPATIENT
Start: 2021-01-01

## 2021-01-01 RX ORDER — LORAZEPAM 0.5 MG/1
0.5 TABLET ORAL ONCE
Status: DISCONTINUED | OUTPATIENT
Start: 2021-01-01 | End: 2021-01-01

## 2021-01-01 RX ORDER — CARVEDILOL 25 MG/1
12.5 TABLET ORAL 2 TIMES DAILY WITH MEALS
Qty: 60 TABLET | Refills: 0 | Status: CANCELLED | OUTPATIENT
Start: 2021-01-01

## 2021-01-01 RX ORDER — FINASTERIDE 5 MG/1
5 TABLET, FILM COATED ORAL DAILY
Status: DISCONTINUED | OUTPATIENT
Start: 2021-01-01 | End: 2021-01-01

## 2021-01-01 RX ORDER — SPIRONOLACTONE 25 MG/1
TABLET ORAL
Qty: 30 TABLET | Refills: 11 | Status: SHIPPED | OUTPATIENT
Start: 2021-01-01

## 2021-01-01 RX ORDER — MORPHINE SULFATE 4 MG/ML
2 INJECTION, SOLUTION INTRAMUSCULAR; INTRAVENOUS ONCE
Status: COMPLETED | OUTPATIENT
Start: 2021-01-01 | End: 2021-01-01

## 2021-01-01 RX ORDER — PREDNISONE 1 MG/1
TABLET ORAL
Qty: 180 TABLET | Refills: 3 | Status: CANCELLED | OUTPATIENT
Start: 2021-01-01

## 2021-01-01 RX ORDER — TORSEMIDE 20 MG/1
TABLET ORAL
Qty: 30 TABLET | Refills: 11 | Status: SHIPPED | OUTPATIENT
Start: 2021-01-01

## 2021-01-01 RX ORDER — WARFARIN SODIUM 3 MG/1
TABLET ORAL
Refills: 0 | OUTPATIENT
Start: 2021-01-01

## 2021-01-01 RX ORDER — SENNA AND DOCUSATE SODIUM 50; 8.6 MG/1; MG/1
2 TABLET, FILM COATED ORAL DAILY
Status: DISCONTINUED | OUTPATIENT
Start: 2021-01-01 | End: 2021-01-01

## 2021-01-01 RX ORDER — HALOPERIDOL 5 MG/ML
2 INJECTION INTRAMUSCULAR EVERY 6 HOURS PRN
Status: DISCONTINUED | OUTPATIENT
Start: 2021-01-01 | End: 2021-01-01

## 2021-01-01 RX ORDER — ACETAMINOPHEN 325 MG/1
TABLET ORAL
Qty: 240 TABLET | Refills: 11 | Status: SHIPPED | OUTPATIENT
Start: 2021-01-01

## 2021-01-01 NOTE — ED NOTES
Staff from [facility] called stating that pt is normally alert to self only, lives in memory unit, was having his meds this am and was moaning and c/o mid back pain w/sob, RA sats 87%, states while having a runny nose he vomited clear liquid

## 2021-01-01 NOTE — ED INITIAL ASSESSMENT (HPI)
Pt arrived via medics to rm 48 with 20g left hand SL for complaint of atraumatic back pain, pt from [facility]

## 2021-01-01 NOTE — ED NOTES
Orders for admission, patient is aware of plan and ready to go upstairs. Any questions, please call ED RN Desire Caceres  at extension [phone]. Pt with 0.9 ns infusing, pt aox1, on 1l nc for hypoxia post morphine administration, daughter at bs.

## 2021-01-01 NOTE — ED PROVIDER NOTES
Patient Seen in: [facility] Emergency Department      History   Patient presents with:  Back Pain    Stated Complaint: back pain    HPI/Subjective:   HPI  History is limited by patient's dementia.     25-year-old male with history of CHF, cardiomyo All other systems reviewed and negative except as noted above.     Physical Exam     ED Triage Vitals   BP 01/01/21 0721 152/74   Pulse 01/01/21 0721 73   Resp 01/01/21 0721 20   Temp 01/01/21 0737 97.9 °F (36.6 °C)   Temp src 01/01/21 0737 Temporal   SpO2 Result Value Ref Range    Hold Lt Green Auto Resulted    RAINBOW DRAW GOLD    Collection Time: 01/01/21  7:25 AM   Result Value Ref Range    Hold Gold Auto Resulted    BASIC METABOLIC PANEL (8)    Collection Time: 01/01/21  7:25 AM   Result Value Ref Range Result Value Ref Range    Urine Color Yellow Yellow    Clarity Urine Clear Clear    Spec Gravity 1.014 1.002 - 1.035    Glucose Urine Negative Negative mg/dL    Bilirubin Urine Negative Negative    Ketones Urine Negative Negative mg/dL    Blood Urine Small - I ordered and personally reviewed the labs and imaging and found no leukocytosis, anemia, dimer elevated, few bacteria, troponin negative, SARAH  - tylenol, lidoderm applied  - fluids ordered  - pt still in pain  - morphine ordered  - discussed with kenneth Back pain without radiation M54.9 1/1/2021 Unknown

## 2021-01-01 NOTE — H&P
[facility]  HISTORY AND PHYSICAL       [place] Presume Patient Status:  Emergency    1940  78year old [facility] [id number]   Location  Attending [place] Todd MD     PCP Elaine Smith MD         DATE OF ADMISSION: 21    C Prescriptions    No medications on file   Previous Medications    ATORVASTATIN (LIPITOR) 20 MG ORAL TAB    Take 1 tablet (20 mg total) by mouth nightly. CARVEDILOL 25 MG ORAL TAB    Take 25 mg by mouth 2 (two) times daily with meals.       FINASTERIDE 5 Relation Age of Onset   • Heart Attack Father 46         of MI   • Cancer Mother 72   • Eye Problems Brother    • Other (cancer lung) Brother    • Heart Disease Brother 79   • Other (crohns) Son    • Other (alive and well) Son    • Other (neorological) inflammatory process. 5. Moderate cardiomegaly with small pericardial effusion.     Dictated by (CST): Marisol Chacon MD on 1/01/2021 at 9:33 AM     Finalized by (CST): Georgina Luu MD on 1/01/2021 at 9:42 AM            Data:  Recent Lab As a result errors may occur. When identified these errors have been corrected.  While every attempt is made to correct errors during dictation discrepancies may still exist

## 2021-01-02 PROCEDURE — 99214 OFFICE O/P EST MOD 30 MIN: CPT | Performed by: INTERNAL MEDICINE

## 2021-01-02 NOTE — CONSULTS
[facility]    Report of Consultation    Arleen Puentes Patient Status:  Observation    1940 MRN [id number]   Location [facility] 3W/SW Attending Ronnie Pressley MD   Hosp Day # 0 PCP Myles Spear MD     Date of Admission: History  Patient Guardians:  Not on file    Other Topics            Concern  Caffeine Concern        No    Comment:2-3 cups of coffee daily    Social History Narrative    None on file            Current Medications:    •  FLUoxetine HCl (PROZAC) cap 20 mg, Tab, TAKE ONE TABLET BY MOUTH TWICE DAILY     •  FINASTERIDE 5 MG Oral Tab, TAKE 1 TABLET (5 MG TOTAL) BY MOUTH DAILY. •  carvedilol 25 MG Oral Tab, Take 25 mg by mouth 2 (two) times daily with meals.       •  spironolactone (ALDACTONE) 25 MG Oral Tab, T 01/31/2020    TSH 10.800 (H) 01/31/2020    PSA 0.4 12/02/2017    DDIMER 4.40 (H) 01/01/2021    ESRML 50 (H) 12/14/2019    CRP 2.05 (H) 12/14/2019    MG 2.2 02/20/2020    PHOS 2.7 02/20/2020    TROP <0.045 01/01/2021     01/31/2020         Imaging:  X

## 2021-01-02 NOTE — CONSULTS
[facility]    PATIENT'S NAME: Pattie Stack   ATTENDING PHYSICIAN: Michael Carmona MD   CONSULTING PHYSICIAN: Abraham Hernandez.  Mary Kate Mendes MD   PATIENT ACCOUNT#:   [id number]    LOCATION:  [address] [address] #:   [id number]       DATE OF 1.5 L.  HEENT:  Largely unremarkable. NECK:  Supple with moderately increased jugular venous pressure. Carotids brisk without bruits. LUNGS:  Diminished breath sounds right lung base. Left clear. HEART:  S1, S2 normal.  No pathologic murmur.   No S3. defibrillator component of his device. 5.   Overall, a conservative approach is recommended and appropriate. Thank you for this consultation. Dictated By Deborah Diaz.  Jessy Holley MD  d: 01/01/2021 18:21:22  t: 01/01/2021 18:31:27  [facility] [id number]  ITALIA/MELISSA

## 2021-01-02 NOTE — CONSULTS
Cardiology (consult dictated)    Assessment:  1. Back pain. Uncertain etiology. CT scan did not show aortic dissection or pulmonary embolism. 2.  Permanent atrial fibrillation. On warfarin therapy. 3.  BiV ICD. Patient is pacer dependent.     4.

## 2021-01-02 NOTE — PHYSICAL THERAPY NOTE
PHYSICAL THERAPY EVALUATION - INPATIENT     Room Number: 327/327-A  Evaluation Date: 1/2/2021  Type of Evaluation: Initial   Physician Order: PT Eval and Treat    Presenting Problem: back pain at [facility] facility   Reason for Therapy: Mobility Dysfunc mechanics; Endurance; Patient education; Family education;Gait training;Strengthening;Transfer training;Balance training  Rehab Potential : Fair  Frequency (Obs): 3-5x/week       PHYSICAL THERAPY MEDICAL/SOCIAL HISTORY     History related to current admission COGNITION  · Overall Cognitive Status:  Impaired  · Orientation Level:  oriented to person, disoriented to place, disoriented to time and disoriented to situation  · Following Commands:  follows one step commands without difficulty and follows one step patient questions and concerns addressed    CURRENT GOALS    Goals to be met by: 1/16/2021  Patient Goal Patient's self-stated goal is: to get up and walk   Goal #1 Patient is able to demonstrate supine - sit EOB @ level: independent     Goal #1   Current

## 2021-01-02 NOTE — PROGRESS NOTES
[facility]    Progress Note    Vickey Benton Patient Status:  Observation    1940 MRN [id number]   Location [facility] 3W/SW Attending Giorgio Woodward MD   Hosp Day # 0 PCP Madelin Wong MD       Subjective:    Princess Huffman spironolactone  25 mg Oral Daily   • Warfarin Sodium  4 mg Oral Nightly       Current PRN Inpatient Meds:      acetaminophen **OR** HYDROcodone-acetaminophen **OR** HYDROcodone-acetaminophen    Results:     Lab Results   Component Value Date    WBC 8.4 01/ Chest Pe Aorta (iv Only) (cpt=71260)    Result Date: 1/1/2021  CONCLUSION:  1. No evidence of pulmonary embolus. 2. Moderate right pleural effusion with compressive atelectasis of the right lower lobe. 3. Emphysema.  4. Tree in bud opacity in the left lower counseling re: treatment plan and workup.     Micheal Ruffin MD, PhD  Page [phone]  [facility]

## 2021-01-02 NOTE — OCCUPATIONAL THERAPY NOTE
OCCUPATIONAL THERAPY EVALUATION - INPATIENT     Room Number: 327/327-A  Evaluation Date: 1/2/2021  Type of Evaluation: Initial  Presenting Problem: back pain    Physician Order: IP Consult to Occupational Therapy  Reason for Therapy: ADL/IADL Dysfunction a Discharge Recommendations: Sub-acute rehabilitation;24 hour care/supervision; Other (Comment)(return to memory care facility)  OT Device Recommendations: TBD    PLAN  OT Treatment Plan: Balance activities; Energy conservation/work simplification techniques;A Home: none  Use of Assistive Device(s): RW PRN    Prior Level of [place]: assist for ADL's and IADL's    SUBJECTIVE  \"I'm at home\"    OCCUPATIONAL THERAPY EXAMINATION      OBJECTIVE  Precautions: Low vision;Hard of hearing  Fall Risk: High fall risk ASSESSMENT  Grooming: min A  Feeding: min A  Bathing: mod A  Toileting: mod A  Upper Extremity Dressing: min A  Lower Extremity Dressing: mod A    Education Provided: Pt educated on OT role and plan of care  Patient End of Session: Up in chair;Needs met; Ca

## 2021-01-02 NOTE — PLAN OF CARE
A&O to self. Managing pain with Norco with relief. No acute events or changes over night. Bed alarm is on. Call light demo performed and within reach. Frequent rounding. Plan is to further discuss palliative care with family.     Problem: Patient Centered C

## 2021-01-02 NOTE — PROGRESS NOTES
[facility]    Progress Note    Kavitha Bryan Patient Status:  Observation    1940 MRN [id number]   Location [facility] 3W/SW Attending Angela Lancaster MD   Hosp Day # 0 PCP Agustin Campos MD        Subjective: Dementia/encephalopathy.   Per PCP.          Plan:  - labs pending  - Strict I&O, daily weights (standing if possible)               Results:     Lab Results   Component Value Date    WBC 8.4 01/01/2021    HGB 12.1 (L) 01/01/2021    HCT 36.7 (L) 01/01/2021 ventricular pacemaker Pacemaker ECG, No further analysis When compared with ECG of 02/16/2020 10:37:18 No change Electronically signed on 01/01/2021 at 21:49 by SOLEDAD Wilcox  1/2/2021      Attending addendum:    I have s

## 2021-01-03 PROCEDURE — 99214 OFFICE O/P EST MOD 30 MIN: CPT | Performed by: NURSE PRACTITIONER

## 2021-01-03 NOTE — PROGRESS NOTES
[facility]    Progress Note    Kavitha Bryan Patient Status:  Observation    1940 MRN [id number]   Location [facility] 3W/SW Attending Eduardo Moran MD   Hosp Day # 0 PCP Agustin Campos MD        Subjective:   Ric [address] Chest Pe Aorta (iv Only) (cpt=71260)    Result Date: 1/1/2021  CONCLUSION:  1. No evidence of pulmonary embolus. 2. Moderate right pleural effusion with compressive atelectasis of the right lower lobe. 3. Emphysema.  4. Tree in bud opacity in the l

## 2021-01-03 NOTE — PLAN OF CARE
Norco for pain, plan for possible SNF at discharge per daughters request.      Problem: Patient Centered Care  Goal: Patient preferences are identified and integrated in the patient's plan of care  Description: Interventions:  - What would you like us to k

## 2021-01-03 NOTE — PLAN OF CARE
A&O to self. On 2.5 L NC. Managing back pain with Norco with some relief. No acute events or changes over night. Call light within reach. Fall precautions in place, with frequent rounding.     Problem: Patient Centered Care  Goal: Patient preferences are id

## 2021-01-03 NOTE — PROGRESS NOTES
[facility]    Progress Note    Darshan Mercado Patient Status:  Observation    1940 MRN [id number]   Location [facility] 3W/SW Attending Pipo Hernández MD   Hosp Day # 0 PCP Houston Davies MD       Subjective:    Chey Gama Daily   • atorvastatin  20 mg Oral Nightly   • carvedilol  25 mg Oral BID with meals   • finasteride  5 mg Oral Daily   • folic acid  1 mg Oral Daily   • hydrALAzine HCl  25 mg Oral BID   • Isosorbide Mononitrate ER  60 mg Oral Daily   • predniSONE  2 mg O 01/01/21. Imaging/EKG:   Xr Chest Pa + Lat Chest (cpt=71046)    Result Date: 1/1/2021  CONCLUSION:  1. Moderate right pleural effusion and diffuse opacification of the right lung. Similar findings were present on previous chest x-ray.     Dictated by (C care consulted    # BPH  # COPD  # History of rheumatoid arthritis  - stable chronic conditions  - cont PTA low dose prednisone  - check ESR per daughters' request    Diet: cardiac  PT/OT: rec ARIADNA  DVT ppx: warfarin  Code status: DNR  Dispo: family request

## 2021-01-03 NOTE — PLAN OF CARE
Pt alert and orientated to self. Oxygen removed and pt saturating at 93% on room air, for afternoon vitals patient oxygen 84% on room air, pt placed back on 2.5L nasal cannula. Pt daughter at bedside. Bladder scan done this am - showed pt was  retaining.  I normal rest cycle i.e. lights off, TV off, minimize noise and interruptions  - Encourage family to assist in orientation and promotion of home routines  Outcome: Progressing

## 2021-01-03 NOTE — PROGRESS NOTES
[facility]    Progress Note    Geeta Allen Patient Status:  Observation    1940 MRN [id number]   Location [facility] 3W/SW Attending Harris Cramer MD   Hosp Day # 0 PCP Junior Dale MD       Subjective:    Isabel Lieberman 10.800 (H) 01/31/2020    PSA 0.4 12/02/2017    DDIMER 4.40 (H) 01/01/2021    ESRML 50 (H) 12/14/2019    CRP 2.05 (H) 12/14/2019    MG 2.2 02/20/2020    PHOS 2.7 02/20/2020    TROP <0.045 01/01/2021     01/31/2020       No results found.           Melba Edward

## 2021-01-04 PROBLEM — Z71.89 ADVANCE CARE PLANNING: Status: ACTIVE | Noted: 2021-01-01

## 2021-01-04 PROBLEM — Z71.89 GOALS OF CARE, COUNSELING/DISCUSSION: Status: ACTIVE | Noted: 2021-01-01

## 2021-01-04 PROCEDURE — 99214 OFFICE O/P EST MOD 30 MIN: CPT | Performed by: INTERNAL MEDICINE

## 2021-01-04 NOTE — DIETARY NOTE
ADULT NUTRITION INITIAL ASSESSMENT    Pt is at high nutrition risk. Pt meets severe malnutrition criteria.       CRITERIA FOR MALNUTRITION DIAGNOSIS:  Criteria for severe malnutrition diagnosis: chronic illness related to wt loss greater than 20% in 1 year Supplements-RD added Ensure Enlive (350 calories/ 20 g protein each) Chocolate BID. Rational/use of oral supplements discussed. - Vitamin and mineral supplements: folic acid  - Feeding assistance: meal set up and feed by staff as needed.   Noted patient ap Poor  Intake: <10%  Intake Meeting Needs: No, but supplements to maximize  Percent Meals Eaten (last 3 days)     Date/Time Percent Meals Eaten (%)    01/02/21 0846  0 %    01/02/21 2000  —    Percent Meals Eaten (%): few bites of applesauce at 01/02/21 200 monitor nutrition status    Ruby Burgess RDN, LDN  Clinical Nutrition  Ext [phone]

## 2021-01-04 NOTE — PHYSICAL THERAPY NOTE
PHYSICAL THERAPY TREATMENT NOTE - INPATIENT     Room Number: 327/327-A       Presenting Problem: back pain     Problem List  Principal Problem:    Back pain without radiation  Active Problems:    CKD (chronic kidney disease), stage III    Cardiomyopathy (H Restriction: None                PAIN ASSESSMENT   Rating: Unable to rate  Location: denies pain throughout session   Management Techniques: Relaxation;Repositioning    BALANCE Status Sit<.stand with RW with Min A/CGA x1   Goal #3 Patient is able to ambulate 100 feet with assist device: walker - rolling at assistance level: supervision   Goal #3   Current Status 10ft x1 with RW with Min A x1           Goal #5 Patient to DR EMELIA MARCUM [facility]

## 2021-01-04 NOTE — HOSPICE RN NOTE
[facility] to meet with daughter Morgan Matamoros at bedside on 1/5/20201 at 11am.POC discussed with [facility] MAG MAYER [facility] RN.

## 2021-01-04 NOTE — CM/SW NOTE
CM received MDO for Hospice. CM made telephone referral to [facility].  CM will f/u with daughter  After hospice referral to arrange PT/OT and community palliative care back at [place] [phone] in the event that patient is not eligib

## 2021-01-04 NOTE — PROGRESS NOTES
[facility]    Progress Note    Connie Gutierrez Patient Status:  Observation    1940 MRN [id number]   Location [facility] 3W/SW Attending Adonay Dominguez MD   Hosp Day # 0 PCP Yu Cain MD       Subjective:    Isabelle Bosworth 01/01/2021    ESRML 81 (H) 01/04/2021    CRP 2.05 (H) 12/14/2019    MG 2.2 02/20/2020    PHOS 2.7 02/20/2020    TROP <0.045 01/01/2021     01/31/2020       No results found.           Libby Du MD  1/4/2021

## 2021-01-04 NOTE — PLAN OF CARE
Continued on 2.5L O2 NC. Attempt to wean. No c/o sob. Coumadin on hold for INR 4.61. Carson in place for urinary retention. No further c/o back pain. Plan for palliative consult today.     Problem: Patient Centered Care  Goal: Patient preferences are identif changes in respiratory status  - Assess for changes in mentation and behavior  - Position to facilitate oxygenation and minimize respiratory effort  - Oxygen supplementation based on oxygen saturation or ABGs  - Provide Smoking Cessation handout, if applic

## 2021-01-04 NOTE — CONSULTS
[address]  Patient Status:  Observation    1940 MRN [id number]   Location [facility] 3W/SW Attending Radha Hebert MD   Hosp Day # 0 PCP Richard Yuen MD     Date of Con Hospitalizations/ED visits:  Patient was last hospitalized here from 1/30/2020-2/5/2020 and then again from 2/16/2020-2/20/2020.      Substance History:  Smoking Status: former  Hx of Substance Use/Abuse: 0-5 beers per week; no illicit drugs known    Allerg been isolated at [facility] due to the coronavirus quarantine restrictions and has not been eating much. He weighed as much as 170 pounds in February or March 2020 and weighs 122 pounds with this hospitalization.  He has been unable to get his new dentures due Current Facility-Administered Medications:   •  acetaminophen (TYLENOL) tab 650 mg, 650 mg, Oral, Q4H PRN **OR** HYDROcodone-acetaminophen (NORCO) 5-325 MG per tab 1 tablet, 1 tablet, Oral, Q6H PRN **OR** [DISCONTINUED] HYDROcodone-acetaminophen (Maribeth Epp temperature 98.3 °F (36.8 °C), temperature source Oral, resp. rate 18, weight 122 lb 4 oz (55.5 kg), SpO2 92 %. Body mass index is 18.59 kg/m².   Present Level of pain: denies    Physical Exam:  General: chronically ill, comfortable, in no apparent distres is out of town. Jacy says she keeps her four siblings in the loop. I asked Jacy about re-hospitalization again in the future versus transition to comfort care/hospice and Jacy told me that her goal is for Bill to be kept comfortable.  She says he hims pacemaker interrogated; Cardiology APN asked to assist  -Daughter would NOT pursue dialysis if this arises  -Daughter would like to meet with [facility] for a hospice informational session/evaluation; SW asked to make referral  -Alternative plan i

## 2021-01-04 NOTE — PROGRESS NOTES
[facility]    Progress Note    Charlene Fry Patient Status:  Observation    1940 MRN [id number]   Location [facility] 3W/SW Attending Patricia Gavin MD   Hosp Day # 0 PCP Chery Castano MD        Subjective:   Wilmer Guerrero 01/01/2021    ESRML 81 (H) 01/04/2021    CRP 2.05 (H) 12/14/2019    MG 2.2 02/20/2020    PHOS 2.7 02/20/2020    TROP <0.045 01/01/2021     01/31/2020       No results found. Independent A/P. Patient evaluated, agree with above.   Note review

## 2021-01-04 NOTE — PROGRESS NOTES
[facility]    Progress Note    Patria Escobar Patient Status:  Observation    1940 MRN [id number]   Location [facility] 3W/SW Attending Jm Pepper MD   Hosp Day # 0 PCP Jasmina Vogt MD       Subjective:    Netta Solorzano Inpatient Meds:      acetaminophen **OR** HYDROcodone-acetaminophen **OR** [DISCONTINUED] HYDROcodone-acetaminophen, ondansetron HCl    Results:     Lab Results   Component Value Date    WBC 9.3 01/03/2021    HGB 10.5 (L) 01/03/2021    HCT 32.3 (L) 01/03/2 Back pain, questionable  - No evidence of PE or aortic dissection on CTA  - back pain is controlled with lidocaine patch and PRN Norco; patient has been denying back pain since admission.  - scheduled Senokot-S for OIC ppx    # Severe LV dysfunction w/ BiV

## 2021-01-04 NOTE — PLAN OF CARE
Problem: Patient Centered Care  Goal: Patient preferences are identified and integrated in the patient's plan of care  Description: Interventions:  - What would you like us to know as we care for you?  From [facility] [place] in  Rita conteh oxygen saturation or ABGs  - Provide Smoking Cessation handout, if applicable  - Encourage broncho-pulmonary hygiene including cough, deep breathe, Incentive Spirometry  - Assess the need for suctioning and perform as needed  - Assess and instruct to repor

## 2021-01-04 NOTE — ICD/PM
Daughter at bedside- met with palliative care- pt and family would like ICD function turned off of PPM- will update her on battery life  Order placed- medtronic updated

## 2021-01-05 PROBLEM — J44.1 COPD EXACERBATION (HCC): Status: RESOLVED | Noted: 2020-02-16 | Resolved: 2021-01-01

## 2021-01-05 PROBLEM — M54.9 BACK PAIN WITHOUT RADIATION: Status: RESOLVED | Noted: 2021-01-01 | Resolved: 2021-01-01

## 2021-01-05 PROBLEM — N17.9 AKI (ACUTE KIDNEY INJURY) (HCC): Status: RESOLVED | Noted: 2021-01-01 | Resolved: 2021-01-01

## 2021-01-05 PROCEDURE — 99214 OFFICE O/P EST MOD 30 MIN: CPT | Performed by: INTERNAL MEDICINE

## 2021-01-05 NOTE — PLAN OF CARE
PRIMARY DIAGNOSIS: BILIARY COLIC-s/p EUS and ERCP  OUTPATIENT/OBSERVATION GOALS TO BE MET BEFORE DISCHARGE    1. Pain status: Pain free.  2. Stable vital signs and labs (if performed) at disposition: Yes  3. Tolerating adequate PO diet: No-NPO for possible surgery tomorrow  4. Successful cholecystectomy or clear follow up plan with General Surgery team if immediate surgery not performed - Possible tri tomorrow, surgery to see pt today  5. ADLs back to baseline?  Yes  6. Activity and level of assistance: Ambulating independently.  7. Barriers to discharge noted No  8.  Labs:  ,     Is patient a falls risk? No  Falls armband on?  No  Within Arm's Reach? No.Reason not within arm's reach is: no falls risk  Bed alarm turned on?   Not applicable,     Personal alarm in place and turned on?   Not applicable,      A & 0 x 4. VSS on RA. Denies pain, nausea, dizziness.NPO for possible tri tomorrow, IVFs infusing. Continue to monitor and offer supportive cares.    Patient was confused and agitated. Stated he needed to urinate in spite of cohn in place and draining. Balloon deflated and catheter slightly removed. Increased urinary drainage noted.  After urinary drainage stopped, attempted to reinflate balloon but pt noise and interruptions  - Encourage family to assist in orientation and promotion of home routines  Outcome: Not Progressing     Problem: RESPIRATORY - ADULT  Goal: Achieves optimal ventilation and oxygenation  Description: INTERVENTIONS:  - Assess for ch ADULT  Goal: Absence of urinary retention  Description: INTERVENTIONS:  - Assess patient’s ability to void and empty bladder  - Monitor intake/output and perform bladder scan as needed  - Follow urinary retention protocol/standard of care  - Consider colla

## 2021-01-05 NOTE — PLAN OF CARE
[facility] [place] [facility]. Called and gave report to Nurse Edgar Venegas at sunrise. Discharge paperwork sent with pt. Tele monitor and IV removed.

## 2021-01-05 NOTE — PHYSICAL THERAPY NOTE
PHYSICAL THERAPY TREATMENT NOTE - INPATIENT     Room Number: 327/327-A       Presenting Problem: Back pain    Problem List  Principal Problem:    Back pain without radiation  Active Problems:    CKD (chronic kidney disease), stage III    Cardiomyopathy (HC training;Transfer training;Balance training    SUBJECTIVE  \"I am ok\"    OBJECTIVE  Precautions: Low vision;Hard of hearing;Bed/chair alarm    WEIGHT BEARING RESTRICTION  Weight Bearing Restriction: None                PAIN ASSESSMENT   Rating: Unable to 1/16/2021  Patient Goal Patient's self-stated goal is: to get up and walk   Goal #1 Patient is able to demonstrate supine - sit EOB @ level: independent     Goal #1   Current Status SBA   Goal #2 Patient is able to demonstrate transfers Sit to/from Stand a

## 2021-01-05 NOTE — HOSPICE RN NOTE
Met briefly with pt. He is confused. SOB with 2liters of O2 on per NC. Pt is very pleasant. Hospice informational session with daughter Javon Reece. Informed of hospice philosophy, levels of care, revocation, Medicare coverage. Talked about goals of care.

## 2021-01-05 NOTE — PLAN OF CARE
Assessed the pt. Vitals checked. Encourage pt use call light while transfer. Pt is on 2L NC. Daughter at bedside. Medtronic Rep came and turn off the ICD. Hospice consult done today with family.   Problem: Patient Centered Care  Goal: Patient preferences are id for changes in respiratory status  - Assess for changes in mentation and behavior  - Position to facilitate oxygenation and minimize respiratory effort  - Oxygen supplementation based on oxygen saturation or ABGs  - Provide Smoking Cessation handout, if ap collaborating with pharmacy to review patient's medication profile  - Implement strategies to promote bladder emptying  Outcome: Progressing

## 2021-01-05 NOTE — PROGRESS NOTES
[facility]    Progress Note    Connie Gutierrez Patient Status:  Observation    1940 MRN [id number]   Location [facility] 3W/SW Attending Adonay Dominguez MD   Hosp Day # 0 PCP Yu Cain MD       Subjective:    Isabelle Bosworth Daily   • predniSONE  2 mg Oral Daily with breakfast       Current PRN Inpatient Meds:      acetaminophen **OR** HYDROcodone-acetaminophen **OR** [DISCONTINUED] HYDROcodone-acetaminophen, ondansetron HCl    Results:     Lab Results   Component Value Date Assessment and Plan:   Abhi Crenshaw is an 77yo male w/ hx sig for dementia, HFrEF and Afib on warfarin, who p/w back pain and confusion from SNF.     # Goal of care  - DNR  - Palliative care saw patient and family 1/4: deactivate ICD, no HD, meet [facility]. Placement per family. Greater than 35 minutes spent, >50% spent counseling re: treatment plan and workup.     Romario Zhao MD, PhD  Page [phone]  [facility]

## 2021-01-05 NOTE — CM/SW NOTE
Informed by [facility] Liaison Consents have been signed by daughter for Hospice. Patient to return to [facility], with [facility]. MDO orders have been received for discharge. Plan: Return to [facility] with [facility].    Ambulanc

## 2021-01-05 NOTE — DISCHARGE SUMMARY
[facility]    Discharge Summary    Kavitha Bryan Patient Status:  Observation    1940 MRN [id number]   Location [facility] 3W/SW Attending Eduardo Moran MD   Hosp Day # 0 PCP Agustin Campos MD     Date of Admission:  Hospice met patient and family.  Decision was made to return to [facility] with [facility] [employer]     # Back pain, questionable  - No evidence of PE or aortic dissection on CTA  - back pain is controlled with lidocaine patch and PRN Norco; patient has been was no JVD. CHEST:  Symmetrical movement on inspiration  LUNGS:  No audible wheezing  ABDOMEN: Non-distended  : Carson removed  MUSCULOSKELETAL:  There was no deformity. EXTREMITIES: There was no edema  NEUROLOGICAL:  There was no focal deficit.     PSY 01/04/2021    CRP 2.05 (H) 12/14/2019    MG 2.2 02/20/2020    PHOS 3.1 01/05/2021    TROP <0.045 01/01/2021     01/31/2020       Recent Labs   Lab 01/01/21  0725 01/02/21  1255 01/03/21  0811 01/04/21  0724 01/05/21  0802   RBC 3.81 3.61* 3.30* 3.24* CONTINUE taking these medications      Instructions Prescription details   atorvastatin 20 MG Tabs  Commonly known as: Lipitor      Take 1 tablet (20 mg total) by mouth nightly.    Quantity: 90 tablet  Refills: 3     carvedilol 25 MG Tabs  Commonly known

## 2021-01-05 NOTE — PROGRESS NOTES
[facility]    Brief Note    Yue Lynch Patient Status:  Observation    1940 MRN [id number]   Location [facility] 3W/SW Attending Wanda Ibrahim MD   Hosp Day # 0 PCP Stone Martinez MD     Date of Note:  2021  Time

## 2021-01-05 NOTE — PROGRESS NOTES
[facility]    Progress Note    Charlene Fry Patient Status:  Observation    1940 MRN [id number]   Location [facility] 3W/SW Attending Patricia Gavin MD   Hosp Day # 0 PCP Chery Castano MD        Subjective:   Wilmer Guerrero 2.05 (H) 12/14/2019    MG 2.2 02/20/2020    PHOS 3.1 01/05/2021    TROP <0.045 01/01/2021     01/31/2020       No results found.            Wanda Kruger, [address] [place] [address] [address] [facility]  Cardiac Electrophysiology  1/5/2021

## 2021-01-06 NOTE — TELEPHONE ENCOUNTER
Please call Qiana/Domenic at [phone]  Pt just returned from hospital  Pt is now hospice patient with [facility]  Should pt INR be taken sooner than 12/13/21 as originally scheduled?   Hospital PT 28.1/INR 2.68 dosage 4MG 3 days per week/3MG 4

## 2021-01-07 NOTE — TELEPHONE ENCOUNTER
Telephone call to [facility] and discussed with nurse taking care of Katie Reynosoena Grecia. Patient is now in hospice. I have asked the nurse to get an INR on the patient tomorrow morning.   Since patient is currently in hospice we may need to consider whether or not

## 2021-01-07 NOTE — TELEPHONE ENCOUNTER
Harriet from [facility] is calling with results   PT 32.7   INR 3.30  Pt. Is taking 4 mg of coumadin Tues, Thurs, Sun.  3mg all other days ph.  # [phone]   Routed high to [facility]

## 2021-01-08 NOTE — TELEPHONE ENCOUNTER
[facility]HR[facility] called---supratherapeutic--- pt is on hospice now; dose was given 1/7/21---hold x1 and decreased dose and recheck 10 days (needs to be on Wed for labs)

## 2021-01-08 NOTE — TELEPHONE ENCOUNTER
Qiana @ [facility] calling with PT INR results     PT - 32.7  INR - 3.30    Current Dosage:  4MG three days a week  3MG four days a week     Best call back for Qiana:   # [phone]  Fax # [phone]

## 2021-01-18 NOTE — TELEPHONE ENCOUNTER
Refill request has failed the Ambulatory Medication Refill Standing Order and is routed to the primary physician to review the following:    Prednisone 1 MG Oral Tab

## 2021-01-18 NOTE — TELEPHONE ENCOUNTER
Refill request has failed the Ambulatory Medication Refill Standing Order and is routed to the primary physician to review the following:    Requested Prescriptions     Refused Prescriptions Disp Refills   • predniSONE 1 MG Oral Tab [Pharmacy Med Name: Will Pope

## 2021-01-21 NOTE — TELEPHONE ENCOUNTER
Enoc Arias called with Jan 20 PT/INR results    PT 23.8    INR 2.36    Currently on 3 mg on mon/tues/wed/friday/saturday    4 mg on Thursday & Sunday     Please call with dose/& next test date    [phone]

## 2021-01-21 NOTE — TELEPHONE ENCOUNTER
[place] faxing PT/INR results from 1/20/2021    PT 23.8  INR 2.36    Coumadin 3 mg Mon, Tue, Wed, Fri & Sat  4 mg all other days    Copy placed on Dr PORTER and [employer]  Tasked to coumadin high

## 2021-01-21 NOTE — TELEPHONE ENCOUNTER
Telephone call to Mer Lamar at [facility]. Patient appears stable at this time. As noted patient's INR was 2.36. I told Mer Lamar that patient should continue the same dose of the Coumadin and have a repeat INR in 1 week.   I will forward this to [employer]

## 2021-01-27 NOTE — TELEPHONE ENCOUNTER
Govind Chavez / [facility]    PT 29.9  INR 3    Coumadin 4 mg Thursday & Sunday  3 mg all other

## 2021-02-17 NOTE — TELEPHONE ENCOUNTER
Please call Ruperto/Domenic:  PT 20.2  INR 1.98  Dosage: 4MG Sunday  3MG all other days    Requesting NEW PT/INR date and order for any changes  New RX for Coumadin    Tasked to nursing

## 2021-02-17 NOTE — TELEPHONE ENCOUNTER
Dudley Christensen from [facility] following up. Advised Physician will call shortly. Dudlye Christensen can be reached at [phone].

## 2021-02-18 NOTE — TELEPHONE ENCOUNTER
PT 20.2  INR 1.98  Dosage 3MG every day except 4MG on Sunday    Please call Josia/[place] with orders at [phone]    Tasked to Coumadin

## 2021-02-18 NOTE — TELEPHONE ENCOUNTER
Ruperto/Domenic called   Kaleigh Prince has discontinued coumadin 1 mg tablets & will need a new prescription

## 2021-02-18 NOTE — TELEPHONE ENCOUNTER
Noted. Telephone call to Singh Kendall at [facility]. I told Singh Kendall that the patient's INR is perfect and the patient should continue the same dose of the Coumadin. Patient should have a repeat INR done in 2 weeks. Singh Kendall has requested that we send a prescription for the patient's Coumadin to either [facility] or to [facility]. I will forward this message to Bienvenido Salazar to take care of this.   Thank you!!

## 2021-02-19 NOTE — TELEPHONE ENCOUNTER
To Isela Landa --- New RX request for Coumadin due to discontinuation through [facility], see message below.

## 2021-02-19 NOTE — TELEPHONE ENCOUNTER
Spoke with Harriet from [facility] who reports patient had an unwitnessed fall from his chair this morning. He was found when doing rounds to bring him to the dining room. Found on his R side with his head erect.  Per Harriet, head was not touching floor when italo

## 2021-02-19 NOTE — TELEPHONE ENCOUNTER
Harriet from [facility] calling to inform Dr. Alondra Murphy patient this morning fell from sitting in chair. Patient was found laying on floor. Has skin tear left hand between thumb and pointer finger. Strips were applied to tear.

## 2021-02-19 NOTE — TELEPHONE ENCOUNTER
Telephone call to Harriet at sunrise. Patient had a fall from a chair. It was unwitnessed. Patient only had a small abrasion on his hand. No other injury has been noted.   Patient was able to walk from his room to the dining room for breakfast.  They will

## 2021-03-03 NOTE — TELEPHONE ENCOUNTER
Per anticoag:  \"  INR goal:  2.0-3.0   TTR:  56.0 % (4.6 y)   INR used for dosing:     Plan last modified:  Felicitas Quiñonez, PharmD (1/28/2021)   Next INR check:     Target end date: Indefinite     \"    To Dr. Jeannine Dugan to please advise in Dr. PORTER's/Delia's absence, thanks!

## 2021-03-03 NOTE — TELEPHONE ENCOUNTER
Spoke with [facility] from [facility] and relayed MD message and instructions, she verbalizes understanding and agrees with plan. She confirms patient has been taking 4mg on Sunday and 3mg all other days.      FYI to [employer]

## 2021-03-03 NOTE — TELEPHONE ENCOUNTER
Qiana from [facility]. is calling with results   PT 20.2    INR 1.98    Ph. # [phone]  Routed high to [facility]

## 2021-03-03 NOTE — TELEPHONE ENCOUNTER
INR 1.98 -- previous INR 3.0. Would continue same coumadin for now (looks like 4mg on Sun and 3mg other days) and repeat another INR in 2 weeks. Pt on hospice, so don't want to be too aggressive.

## 2021-03-16 NOTE — TELEPHONE ENCOUNTER
Qiana from [facility] called  Pt needs prednisone refill sent to [facility]

## 2021-03-17 NOTE — TELEPHONE ENCOUNTER
Called back and spoke to Marti Rodrigues. Prednisone refill comes from Dr Su Foote. I relayed this to Marti Rodrigues and also gave her the phone number for Dr Su Foote [phone].

## 2021-03-17 NOTE — TELEPHONE ENCOUNTER
LOV: 12/17/2019  No future appointments. LABS:  Component      Latest Ref Rng & Units 1/5/2021 1/4/2021   Glucose      70 - 99 mg/dL 88    Sodium      136 - 145 mmol/L 138    Potassium      3.5 - 5.1 mmol/L 4.8    Chloride      98 - 112 mmol/L 104    Carbon Dioxide, Total      21.0 - 32.0 mmol/L 29.0    ANION GAP      0 - 18 mmol/L 5    BUN      7 - 18 mg/dL 62 (H)    CREATININE      0.70 - 1.30 mg/dL 1.76 (H)    BUN/CREAT Ratio      10.0 - 20.0 35.2 (H)    CALCIUM      8.5 - 10.1 mg/dL 9.8    CALCULATED OSMOLALITY      275 - 295 mOsm/kg 303 (H)    eGFR NON-AFR.  AMERICAN      >=60 35 (L)    eGFR       >=60 41 (L)    Albumin      3.4 - 5.0 g/dL 2.7 (L)    PHOSPHORUS      2.5 - 4.9 mg/dL 3.1    SED RATE      0 - 20 mm/Hr  81 (H)

## 2021-03-17 NOTE — TELEPHONE ENCOUNTER
Qiana zuniga is requesting a refill for prednisone. Please advise. Patient is out of medication.        predniSONE 1 MG Oral Tab 180 tablet 3 12/17/2019     Sig: TAKE TWO TABLETS (2MG) BY MOUTH IN THE MORNING

## 2021-03-17 NOTE — TELEPHONE ENCOUNTER
Refilled his prednisone for 3 months. Please help him schedule a follow-up appointment. I have not seen him for over a year. Thank you.

## 2021-03-18 NOTE — TELEPHONE ENCOUNTER
Called and spoke with daughter Kathryn Kidd, name  and  of patient was verified. Informed per Dr. Caleb Jennings he has not been seen over a year and needs a follow up visit. Kathryn Kidd stated her father is in hospice care with [facility] and  He will not be taken out of facility at this time due to covid pandemic. She would appreciate if Dr. Caleb Jennings continues to fill medication to prevent a flare up and when time appropriate and if father is in good health. Please advise.

## 2021-03-18 NOTE — TELEPHONE ENCOUNTER
Glo Ohara from [facility] calling. Following up on message [facility] left with phone service last night. Message not received at  from service.      Patient INR/PT    Collected 3/17/2021  PT 19.2  INR 1.88    Best call back number [phone]

## 2021-03-19 NOTE — TELEPHONE ENCOUNTER
Noted.  I have returned the call to [facility] concerning patient. Patient's INR was 1.88 with a pro time of 19.7. I have advised J that we should continue the same dose of Coumadin at this time and repeat a pro time INR in 2 weeks.   I will forward t

## 2021-03-26 NOTE — TELEPHONE ENCOUNTER
To Dr. Heraclio Medrano, the fax is on your desk. There are a few additions to the list that are not in Epic. Thank you.

## 2021-03-26 NOTE — TELEPHONE ENCOUNTER
Received a fax from [employer] of the patient's most recent medication list. [address] nurse, Epifanio Fan, sent over the medication list with a note stating, \"Can you please review current med list, sign and fax back. \"    Medication list placed in

## 2021-03-26 NOTE — TELEPHONE ENCOUNTER
Noted.  IV reviewed patient's medication list and signed it. I have left the signed list on my nurses desk. Please fax back as requested.   Thank you!!

## 2021-03-29 NOTE — TELEPHONE ENCOUNTER
Medication list faxed back to [address] at [phone] - confirmation received - to scanning

## 2021-03-31 NOTE — TELEPHONE ENCOUNTER
Spoke to [place] and relayed Dr Ting Vaz instructions to her. She verbalized understanding.  AC module updated    To Dr Nayeli Caruso for review per Dr Derrick Naranjo

## 2021-03-31 NOTE — TELEPHONE ENCOUNTER
Increase coumadin to 4mg on Wed and Sun, 3mg other days. Repeat INR in a week. Pt on hospice. Continue coumadin? To Dr. Miguel Ángel Kinsey for review.

## 2021-03-31 NOTE — TELEPHONE ENCOUNTER
To Dr Jennifer Gonzalez can you please review---INR subtherapeutic and Allan Ricketts and Dr PORTER out of office today  Patient on Hospice

## 2021-04-08 NOTE — TELEPHONE ENCOUNTER
Telephone call to Dayna Michelle at [facility]. Patient's INR is 1.4. Dayna Michelle says the patient is doing \"great\". I will increase the patient's Coumadin to 4 mg on Monday Wednesday and Friday and 3 mg on the other days. She will repeat the INR in 1 week. She is requesting a new prescription for Coumadin be sent to [facility] where the facility gets their medications and Coumadin. I will forward this message to [employer] to send a new prescription for Coumadin to [facility] as requested.   Thank you!!

## 2021-04-14 NOTE — TELEPHONE ENCOUNTER
Please call Анна/Domenic regarding:  PT 14.4  INR 1.38  Dosage:  4MG Mon, Wed, Fri  3MG all other days  Tasked to Coumadin

## 2021-04-14 NOTE — TELEPHONE ENCOUNTER
Noted.  I have contacted Delia Cat at [facility] regarding patient's INR which is 1.38. I have increased the patient's Coumadin to 4 mg 5 days a week and continue Coumadin 3 mg/day 2 days a week on Tuesday and Thursday.   [facility] has requested that we send a new

## 2021-04-22 NOTE — TELEPHONE ENCOUNTER
Spoke with Wilfredo Shirley from [facility]. Patient will be discharged as he has not shown a decline. If anything, he's shown improvement. Patient is ordered to use 02 2L NC continuously, but he does not use as ordered. He is using it intermittently. DX: COPD.

## 2021-04-22 NOTE — TELEPHONE ENCOUNTER
Nicolasa Andersen from [facility] hospice calling to inform Dr. Newell Portal that patient is being discharged from Hospice. Patient does use oxygen at home, will need order. No call back necessary, unless have questions.   Nicolasa Andersen at [facility] [phone]

## 2021-04-22 NOTE — TELEPHONE ENCOUNTER
faxed [facility] - subtherapeutic; pt is off hospice now; he is doing great---increased dose and recheck 2 weeks

## 2021-04-22 NOTE — TELEPHONE ENCOUNTER
Milvia from [facility] with PT/INR results.     PT 16.8  INR 1.62    Dosage: 4mg MWFSaSu               3mg evening T/Th    Marielle Urbina from [id number] [place] [address] 83

## 2021-04-23 NOTE — TELEPHONE ENCOUNTER
DME order faxed to [facility] @ [phone], conformation received. LM notfying pts daughter orders have been faxed, advised to call back if any further questions or concerns.

## 2021-04-27 NOTE — TELEPHONE ENCOUNTER
[facility] called. She received the DME order but it was missing the pt's office visit notes from the last 30 days,the Oxygen saturation test results that was completed in the last 30 days and corrected prescription for the oxygen.   If they do not re

## 2021-04-27 NOTE — TELEPHONE ENCOUNTER
I spoke with [facility]. They will need an office note within the last 30 days and an oxygen saturation test completed by a nurse or doctor.  Explained that Dr. Tamara oByce is out of the office today and asked for an extension on the order faxed las

## 2021-04-28 NOTE — TELEPHONE ENCOUNTER
I spoke with [facility] at [phone]. Qiana LAO says that she can check the patient's oxygen at rest. She will call back with this number.  She says that if an oxygen test was needed then a home health would need to be called in to complete the oxygen testin

## 2021-04-28 NOTE — TELEPHONE ENCOUNTER
Noted. Please contact the nurse at [facility] to see if the nurse can do an O2 saturation on the patient and call me/us with the result. I will route this to nursing.   Thank you!!    I did call the patient's daughter and left a message on her cell phone venus

## 2021-04-29 NOTE — TELEPHONE ENCOUNTER
I have reviewed the O2 saturations that were called in from [facility]. The O2 saturations are very good. With these readings patient does not need oxygen. I have contacted patient's daughter, Grant Resendez, and let her know this.  She had questions as

## 2021-05-06 NOTE — TELEPHONE ENCOUNTER
Angela Tyler / Lexie Melchor calling with PT/INR results from 5/6/2021    PT 32.0  INR 2.9    Coumadin 3 mg Tuesdays  4 mg all other days    Phone [phone]   Tasked to coumadin high

## 2021-05-06 NOTE — TELEPHONE ENCOUNTER
faxed [facility] - therapeutic; pt is off hospice now; he is doing great---CPM and recheck 2 weeks

## 2021-05-20 NOTE — TELEPHONE ENCOUNTER
[facility] - therapeutic; pt is off hospice now; he is doing great---CPM and recheck 2 weeks

## 2021-05-26 NOTE — TELEPHONE ENCOUNTER
Patient's daughter, Irma Julio, states that Rosi Umana was discharged from hospice on 4/26. He is in an [address] at [address] [place] [address]  You can speak with the nurses and his medication. Any questions, please call Nata Buchanan, [phone].

## 2021-05-26 NOTE — TELEPHONE ENCOUNTER
Left message for daughter, Eve Macdonald, to call back. Per refill request 3/17/21, patient is on hospice care, need to see if patient is getting medications from hospice company?

## 2021-06-04 NOTE — TELEPHONE ENCOUNTER
Delai Cat from [facility] is calling with results     INR 2.2  PT 25.2  Ph. # [phone]  [facility]

## 2021-06-22 NOTE — TELEPHONE ENCOUNTER
INR 7.38   Today to DR.B and Edgardo Davis
Noted.  Thank you!!
Spoke to Maurice Moreno dtr--- reviewed bleeding precautions ;   No current bleeding or bruising reported;  Hold coumadin until recheck 12/23
denies street drug use/caffeine

## 2021-07-08 NOTE — TELEPHONE ENCOUNTER
Harriet from [facility] calling with INR/PT results    INR 1.5  PT 22.2    Dosage: Coumadin 4mg MWFSaSu, 3mg all other days.     Best call back number [phone]

## 2021-07-23 NOTE — TELEPHONE ENCOUNTER
Harriet from [facility] is calling with PT / INR results:    PT 28.8    INR 2.6    Pt is currently on 3mg of coumadin every Tuesday and 4 mg on every other day. Please call Harriet back with future orders and how to proceed.     [facility] [phone]

## 2021-07-28 ENCOUNTER — HOSPITAL ENCOUNTER (INPATIENT)
Age: 81
LOS: 3 days | Discharge: HOME HEALTH | DRG: 291 | End: 2021-07-31
Attending: EMERGENCY MEDICINE | Admitting: FAMILY MEDICINE

## 2021-07-28 ENCOUNTER — APPOINTMENT (OUTPATIENT)
Dept: CT IMAGING | Age: 81
DRG: 291 | End: 2021-07-28
Attending: EMERGENCY MEDICINE

## 2021-07-28 ENCOUNTER — APPOINTMENT (OUTPATIENT)
Dept: GENERAL RADIOLOGY | Age: 81
DRG: 291 | End: 2021-07-28
Attending: EMERGENCY MEDICINE
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
## 2021-07-28 DIAGNOSIS — I50.9 ACUTE ON CHRONIC CONGESTIVE HEART FAILURE, UNSPECIFIED HEART FAILURE TYPE (CMD): ICD-10-CM

## 2021-07-28 DIAGNOSIS — I50.23 ACUTE ON CHRONIC SYSTOLIC HEART FAILURE (CMD): ICD-10-CM

## 2021-07-28 DIAGNOSIS — S51.812A LACERATION OF LEFT FOREARM, INITIAL ENCOUNTER: ICD-10-CM

## 2021-07-28 DIAGNOSIS — F03.90 DEMENTIA WITHOUT BEHAVIORAL DISTURBANCE, UNSPECIFIED DEMENTIA TYPE: ICD-10-CM

## 2021-07-28 DIAGNOSIS — T07.XXXA MULTIPLE CONTUSIONS: ICD-10-CM

## 2021-07-28 DIAGNOSIS — R06.4 LABORED BREATHING: Primary | ICD-10-CM
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
## 2021-07-28 LAB
ALBUMIN SERPL-MCNC: 3 G/DL (ref 3.6–5.1)
ALBUMIN/GLOB SERPL: 0.9 {RATIO} (ref 1–2.4)
ALP SERPL-CCNC: 80 UNITS/L (ref 45–117)
ALT SERPL-CCNC: 21 UNITS/L
ANION GAP SERPL CALC-SCNC: 11 MMOL/L (ref 10–20)
APTT PPP: 30 SEC (ref 22–30)
AST SERPL-CCNC: 13 UNITS/L
BASOPHILS # BLD: 0 K/MCL (ref 0–0.3)
BASOPHILS NFR BLD: 0 %
BILIRUB SERPL-MCNC: 1.1 MG/DL (ref 0.2–1)
BUN SERPL-MCNC: 58 MG/DL (ref 6–20)
BUN/CREAT SERPL: 34 (ref 7–25)
CALCIUM SERPL-MCNC: 8.6 MG/DL (ref 8.4–10.2)
CHLORIDE SERPL-SCNC: 105 MMOL/L (ref 98–107)
CO2 SERPL-SCNC: 26 MMOL/L (ref 21–32)
CREAT SERPL-MCNC: 1.69 MG/DL (ref 0.67–1.17)
DEPRECATED RDW RBC: 57.1 FL (ref 39–50)
EOSINOPHIL # BLD: 0.1 K/MCL (ref 0–0.5)
EOSINOPHIL NFR BLD: 1 %
ERYTHROCYTE [DISTWIDTH] IN BLOOD: 15.7 % (ref 11–15)
FASTING DURATION TIME PATIENT: ABNORMAL H
GFR SERPLBLD BASED ON 1.73 SQ M-ARVRAT: 37 ML/MIN/1.73M2
GLOBULIN SER-MCNC: 3.5 G/DL (ref 2–4)
GLUCOSE SERPL-MCNC: 91 MG/DL (ref 65–99)
HCT VFR BLD CALC: 34.1 % (ref 39–51)
HGB BLD-MCNC: 11 G/DL (ref 13–17)
IMM GRANULOCYTES # BLD AUTO: 0 K/MCL (ref 0–0.2)
IMM GRANULOCYTES # BLD: 0 %
INR PPP: 1.8
LYMPHOCYTES # BLD: 0.3 K/MCL (ref 1–4)
LYMPHOCYTES NFR BLD: 4 %
MCH RBC QN AUTO: 32.3 PG (ref 26–34)
MCHC RBC AUTO-ENTMCNC: 32.3 G/DL (ref 32–36.5)
MCV RBC AUTO: 100 FL (ref 78–100)
MONOCYTES # BLD: 0.8 K/MCL (ref 0.3–0.9)
MONOCYTES NFR BLD: 10 %
NEUTROPHILS # BLD: 6.8 K/MCL (ref 1.8–7.7)
NEUTROPHILS NFR BLD: 85 %
NRBC BLD MANUAL-RTO: 0 /100 WBC
NT-PROBNP SERPL-MCNC: 8820 PG/ML
PLATELET # BLD AUTO: 119 K/MCL (ref 140–450)
POTASSIUM SERPL-SCNC: 4.4 MMOL/L (ref 3.4–5.1)
PROT SERPL-MCNC: 6.5 G/DL (ref 6.4–8.2)
PROTHROMBIN TIME: 19.1 SEC (ref 9.7–11.8)
RBC # BLD: 3.41 MIL/MCL (ref 4.5–5.9)
SODIUM SERPL-SCNC: 138 MMOL/L (ref 135–145)
TROPONIN I SERPL HS-MCNC: <0.02 NG/ML
WBC # BLD: 8 K/MCL (ref 4.2–11)

## 2021-07-28 PROCEDURE — 85025 COMPLETE CBC W/AUTO DIFF WBC: CPT | Performed by: EMERGENCY MEDICINE

## 2021-07-28 PROCEDURE — 83735 ASSAY OF MAGNESIUM: CPT | Performed by: FAMILY MEDICINE

## 2021-07-28 PROCEDURE — 10006031 HB ROOM CHARGE TELEMETRY

## 2021-07-28 PROCEDURE — 10002800 HB RX 250 W HCPCS: Performed by: EMERGENCY MEDICINE

## 2021-07-28 PROCEDURE — 73080 X-RAY EXAM OF ELBOW: CPT

## 2021-07-28 PROCEDURE — 12004 RPR S/N/AX/GEN/TRK7.6-12.5CM: CPT

## 2021-07-28 PROCEDURE — 80053 COMPREHEN METABOLIC PANEL: CPT | Performed by: EMERGENCY MEDICINE

## 2021-07-28 PROCEDURE — 13003289 HB OXYGEN THERAPY DAILY

## 2021-07-28 PROCEDURE — 85730 THROMBOPLASTIN TIME PARTIAL: CPT | Performed by: EMERGENCY MEDICINE

## 2021-07-28 PROCEDURE — 12004 RPR S/N/AX/GEN/TRK7.6-12.5CM: CPT | Performed by: EMERGENCY MEDICINE

## 2021-07-28 PROCEDURE — G1004 CDSM NDSC: HCPCS

## 2021-07-28 PROCEDURE — 73030 X-RAY EXAM OF SHOULDER: CPT

## 2021-07-28 PROCEDURE — 83880 ASSAY OF NATRIURETIC PEPTIDE: CPT | Performed by: EMERGENCY MEDICINE

## 2021-07-28 PROCEDURE — 90715 TDAP VACCINE 7 YRS/> IM: CPT | Performed by: EMERGENCY MEDICINE

## 2021-07-28 PROCEDURE — 93005 ELECTROCARDIOGRAM TRACING: CPT | Performed by: EMERGENCY MEDICINE

## 2021-07-28 PROCEDURE — 85610 PROTHROMBIN TIME: CPT | Performed by: EMERGENCY MEDICINE

## 2021-07-28 PROCEDURE — 71045 X-RAY EXAM CHEST 1 VIEW: CPT

## 2021-07-28 PROCEDURE — 96374 THER/PROPH/DIAG INJ IV PUSH: CPT

## 2021-07-28 PROCEDURE — 84484 ASSAY OF TROPONIN QUANT: CPT | Performed by: EMERGENCY MEDICINE

## 2021-07-28 PROCEDURE — 99285 EMERGENCY DEPT VISIT HI MDM: CPT

## 2021-07-28 PROCEDURE — 80061 LIPID PANEL: CPT | Performed by: FAMILY MEDICINE

## 2021-07-28 PROCEDURE — 90471 IMMUNIZATION ADMIN: CPT | Performed by: EMERGENCY MEDICINE

## 2021-07-28 PROCEDURE — 70450 CT HEAD/BRAIN W/O DYE: CPT

## 2021-07-28 RX ORDER — ONDANSETRON 2 MG/ML
4 INJECTION INTRAMUSCULAR; INTRAVENOUS EVERY 6 HOURS PRN
Status: DISCONTINUED | OUTPATIENT
Start: 2021-07-28 | End: 2021-07-31 | Stop reason: HOSPADM

## 2021-07-28 RX ORDER — MAGNESIUM HYDROXIDE/ALUMINUM HYDROXICE/SIMETHICONE 120; 1200; 1200 MG/30ML; MG/30ML; MG/30ML
30 SUSPENSION ORAL EVERY 4 HOURS PRN
Status: DISCONTINUED | OUTPATIENT
Start: 2021-07-28 | End: 2021-07-31 | Stop reason: HOSPADM

## 2021-07-28 RX ORDER — 0.9 % SODIUM CHLORIDE 0.9 %
2 VIAL (ML) INJECTION EVERY 12 HOURS SCHEDULED
Status: DISCONTINUED | OUTPATIENT
Start: 2021-07-29 | End: 2021-07-31 | Stop reason: HOSPADM

## 2021-07-28 RX ORDER — FUROSEMIDE 40 MG/1
40 TABLET ORAL
Status: DISCONTINUED | OUTPATIENT
Start: 2021-07-29 | End: 2021-07-29 | Stop reason: ALTCHOICE

## 2021-07-28 RX ORDER — POLYETHYLENE GLYCOL 3350 17 G/17G
17 POWDER, FOR SOLUTION ORAL DAILY PRN
Status: DISCONTINUED | OUTPATIENT
Start: 2021-07-28 | End: 2021-07-31 | Stop reason: HOSPADM

## 2021-07-28 RX ORDER — FUROSEMIDE 10 MG/ML
40 INJECTION INTRAMUSCULAR; INTRAVENOUS ONCE
Status: COMPLETED | OUTPATIENT
Start: 2021-07-28 | End: 2021-07-28

## 2021-07-28 RX ORDER — AMOXICILLIN 250 MG
2 CAPSULE ORAL DAILY PRN
Status: DISCONTINUED | OUTPATIENT
Start: 2021-07-28 | End: 2021-07-31 | Stop reason: HOSPADM

## 2021-07-28 RX ADMIN — FUROSEMIDE 40 MG: 10 INJECTION, SOLUTION INTRAMUSCULAR; INTRAVENOUS at 20:10

## 2021-07-28 RX ADMIN — TETANUS TOXOID, REDUCED DIPHTHERIA TOXOID AND ACELLULAR PERTUSSIS VACCINE, ADSORBED 0.5 ML: 5; 2.5; 8; 8; 2.5 SUSPENSION INTRAMUSCULAR at 18:47

## 2021-07-28 ASSESSMENT — PAIN SCALES - GENERAL: PAINLEVEL_OUTOF10: 0

## 2021-07-28 NOTE — TELEPHONE ENCOUNTER
Pt had fall, has a deep skin tear on left elbow  Pt denies hitting head  Pt being taken to [facility]   Tasked to nursing

## 2021-07-28 NOTE — TELEPHONE ENCOUNTER
Dr. Rebecca Gonzales, I spoke with Kathryn Oliver at [facility].  Patient was taken to [facility].

## 2021-07-29 ENCOUNTER — APPOINTMENT (OUTPATIENT)
Dept: ULTRASOUND IMAGING | Age: 81
DRG: 291 | End: 2021-07-29
Attending: INTERNAL MEDICINE

## 2021-07-29 ENCOUNTER — APPOINTMENT (OUTPATIENT)
Dept: GENERAL RADIOLOGY | Age: 81
DRG: 291 | End: 2021-07-29
Attending: FAMILY MEDICINE

## 2021-07-29 ENCOUNTER — APPOINTMENT (OUTPATIENT)
Dept: CARDIOLOGY | Age: 81
DRG: 291 | End: 2021-07-29
Attending: FAMILY MEDICINE
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
## 2021-07-29 PROBLEM — I50.23 ACUTE ON CHRONIC SYSTOLIC HEART FAILURE (CMD): Status: ACTIVE | Noted: 2019-06-06
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
## 2021-07-29 LAB
ANION GAP SERPL CALC-SCNC: 10 MMOL/L (ref 10–20)
ATRIAL RATE (BPM): 70
BUN SERPL-MCNC: 58 MG/DL (ref 6–20)
BUN/CREAT SERPL: 36 (ref 7–25)
CALCIUM SERPL-MCNC: 9.1 MG/DL (ref 8.4–10.2)
CHLORIDE SERPL-SCNC: 104 MMOL/L (ref 98–107)
CHOLEST SERPL-MCNC: 79 MG/DL
CHOLEST/HDLC SERPL: 2.1 {RATIO}
CO2 SERPL-SCNC: 29 MMOL/L (ref 21–32)
CREAT SERPL-MCNC: 1.62 MG/DL (ref 0.67–1.17)
FASTING DURATION TIME PATIENT: ABNORMAL H
FASTING DURATION TIME PATIENT: ABNORMAL H
GFR SERPLBLD BASED ON 1.73 SQ M-ARVRAT: 39 ML/MIN/1.73M2
GLUCOSE SERPL-MCNC: 92 MG/DL (ref 65–99)
HDLC SERPL-MCNC: 37 MG/DL
LDLC SERPL CALC-MCNC: 25 MG/DL
MAGNESIUM SERPL-MCNC: 2.2 MG/DL (ref 1.7–2.4)
NONHDLC SERPL-MCNC: 42 MG/DL
POTASSIUM SERPL-SCNC: 3.7 MMOL/L (ref 3.4–5.1)
QRS-INTERVAL (MSEC): 182
QT-INTERVAL (MSEC): 532
QTC: 574
R AXIS (DEGREES): -91
RAINBOW EXTRA TUBES HOLD SPECIMEN: NORMAL
RAINBOW EXTRA TUBES HOLD SPECIMEN: NORMAL
REPORT TEXT: NORMAL
SODIUM SERPL-SCNC: 139 MMOL/L (ref 135–145)
T AXIS (DEGREES): 91
TRIGL SERPL-MCNC: 87 MG/DL
VENTRICULAR RATE EKG/MIN (BPM): 70

## 2021-07-29 PROCEDURE — 93306 TTE W/DOPPLER COMPLETE: CPT

## 2021-07-29 PROCEDURE — 36415 COLL VENOUS BLD VENIPUNCTURE: CPT | Performed by: FAMILY MEDICINE

## 2021-07-29 PROCEDURE — 93880 EXTRACRANIAL BILAT STUDY: CPT

## 2021-07-29 PROCEDURE — 71046 X-RAY EXAM CHEST 2 VIEWS: CPT

## 2021-07-29 PROCEDURE — 97530 THERAPEUTIC ACTIVITIES: CPT

## 2021-07-29 PROCEDURE — 93289 INTERROG DEVICE EVAL HEART: CPT | Performed by: NURSE PRACTITIONER

## 2021-07-29 PROCEDURE — 10006031 HB ROOM CHARGE TELEMETRY

## 2021-07-29 PROCEDURE — 93306 TTE W/DOPPLER COMPLETE: CPT | Performed by: INTERNAL MEDICINE

## 2021-07-29 PROCEDURE — 80048 BASIC METABOLIC PNL TOTAL CA: CPT | Performed by: FAMILY MEDICINE

## 2021-07-29 PROCEDURE — 10002805 HB CONTRAST AGENT: Performed by: FAMILY MEDICINE

## 2021-07-29 PROCEDURE — 13003289 HB OXYGEN THERAPY DAILY

## 2021-07-29 PROCEDURE — 10004651 HB RX, NO CHARGE ITEM: Performed by: FAMILY MEDICINE

## 2021-07-29 PROCEDURE — 10002803 HB RX 637: Performed by: FAMILY MEDICINE

## 2021-07-29 PROCEDURE — 99223 1ST HOSP IP/OBS HIGH 75: CPT | Performed by: INTERNAL MEDICINE

## 2021-07-29 PROCEDURE — 10002800 HB RX 250 W HCPCS: Performed by: INTERNAL MEDICINE

## 2021-07-29 PROCEDURE — 97162 PT EVAL MOD COMPLEX 30 MIN: CPT

## 2021-07-29 RX ORDER — BISACODYL 10 MG
10 SUPPOSITORY, RECTAL RECTAL DAILY PRN
COMMUNITY

## 2021-07-29 RX ORDER — ACETAMINOPHEN 325 MG/1
650 TABLET ORAL EVERY 6 HOURS PRN
COMMUNITY

## 2021-07-29 RX ORDER — FUROSEMIDE 10 MG/ML
40 INJECTION INTRAMUSCULAR; INTRAVENOUS
Status: DISCONTINUED | OUTPATIENT
Start: 2021-07-29 | End: 2021-07-30

## 2021-07-29 RX ORDER — LORAZEPAM 2 MG/ML
1 CONCENTRATE ORAL EVERY 4 HOURS PRN
COMMUNITY

## 2021-07-29 RX ORDER — ISOSORBIDE MONONITRATE 60 MG/1
60 TABLET, EXTENDED RELEASE ORAL DAILY
COMMUNITY

## 2021-07-29 RX ORDER — MORPHINE SULFATE 100 MG/5ML
5 SOLUTION ORAL EVERY 4 HOURS PRN
COMMUNITY

## 2021-07-29 RX ORDER — WARFARIN SODIUM 1 MG/1
4 TABLET ORAL SEE ADMIN INSTRUCTIONS
COMMUNITY
Start: 2021-08-01

## 2021-07-29 RX ORDER — LORAZEPAM 0.5 MG/1
0.5 TABLET ORAL 2 TIMES DAILY PRN
COMMUNITY

## 2021-07-29 RX ORDER — SPIRONOLACTONE 25 MG/1
25 TABLET ORAL DAILY
COMMUNITY

## 2021-07-29 RX ORDER — ACETAMINOPHEN 650 MG/1
650 SUPPOSITORY RECTAL EVERY 8 HOURS PRN
COMMUNITY

## 2021-07-29 RX ADMIN — FUROSEMIDE 40 MG: 10 INJECTION, SOLUTION INTRAVENOUS at 17:42

## 2021-07-29 RX ADMIN — SODIUM CHLORIDE, PRESERVATIVE FREE 2 ML: 5 INJECTION INTRAVENOUS at 01:19

## 2021-07-29 RX ADMIN — PERFLUTREN 4 ML: 6.52 INJECTION, SUSPENSION INTRAVENOUS at 13:09

## 2021-07-29 RX ADMIN — FUROSEMIDE 40 MG: 40 TABLET ORAL at 08:46

## 2021-07-29 RX ADMIN — SODIUM CHLORIDE, PRESERVATIVE FREE 2 ML: 5 INJECTION INTRAVENOUS at 08:46

## 2021-07-29 RX ADMIN — SODIUM CHLORIDE, PRESERVATIVE FREE 2 ML: 5 INJECTION INTRAVENOUS at 20:00

## 2021-07-29 ASSESSMENT — ACTIVITIES OF DAILY LIVING (ADL)
TRANSFERRING: INDEPENDENT
RECENT_DECLINE_ADL: NO
FEEDING YOURSELF: INDEPENDENT
DRESSING YOURSELF: NEEDS ASSISTANCE
ADL_SHORT_OF_BREATH: NO
CONTINENCE: NEEDS ASSISTANCE
TOILETING: INDEPENDENT
CHRONIC_PAIN_PRESENT: NO
BATHING: INDEPENDENT
ADL_BEFORE_ADMISSION: NEEDS/REQUIRES ASSISTANCE
ADL_SCORE: 10

## 2021-07-29 ASSESSMENT — LIFESTYLE VARIABLES
ALCOHOL_USE_STATUS: NO OR LOW RISK WITH VALIDATED TOOL
HOW OFTEN DO YOU HAVE A DRINK CONTAINING ALCOHOL: 2 TO 4 TIMES PER MONTH
HOW OFTEN DO YOU HAVE 6 OR MORE DRINKS ON ONE OCCASION: NEVER
AUDIT-C TOTAL SCORE: 2
HOW MANY STANDARD DRINKS CONTAINING ALCOHOL DO YOU HAVE ON A TYPICAL DAY: 0,1 OR 2

## 2021-07-29 ASSESSMENT — COLUMBIA-SUICIDE SEVERITY RATING SCALE - C-SSRS
6. HAVE YOU EVER DONE ANYTHING, STARTED TO DO ANYTHING, OR PREPARED TO DO ANYTHING TO END YOUR LIFE?: NO
IS THE PATIENT ABLE TO COMPLETE C-SSRS: YES
2. HAVE YOU ACTUALLY HAD ANY THOUGHTS OF KILLING YOURSELF?: NO
1. WITHIN THE PAST MONTH, HAVE YOU WISHED YOU WERE DEAD OR WISHED YOU COULD GO TO SLEEP AND NOT WAKE UP?: NO

## 2021-07-29 ASSESSMENT — COGNITIVE AND FUNCTIONAL STATUS - GENERAL
TAKING CARE OF COMPLICATED TASKS: UNABLE
REMEMBERING TO TAKE MEDICATION: UNABLE
ARE YOU BLIND OR DO YOU HAVE SERIOUS DIFFICULTY SEEING, EVEN WHEN WEARING GLASSES: NO
FOLLOWS FAMILIAR CONVERSATION: A LITTLE
BASIC_MOBILITY_RAW_SCORE: 18
DAILY_ACTIVITY_RAW_SCORE: 17
HELP NEEDED FOR PERSONAL GROOMING: A LITTLE
REMEMBERING 5 ERRANDS WITH NO LIST: UNABLE
APPLIED_COGNITIVE_RAW_SCORE: 10
DO YOU HAVE DIFFICULTY DRESSING OR BATHING: YES
BECAUSE OF A PHYSICAL, MENTAL, OR EMOTIONAL CONDITION, DO YOU HAVE SERIOUS DIFFICULTY CONCENTRATING, REMEMBERING OR MAKING DECISIONS: YES
UNDERSTANDING 10 TO 15 MIN SPEECH: A LITTLE
HELP NEEDED DRESSING REGULAR UPPER BODY CLOTHING: A LITTLE
ARE YOU DEAF OR DO YOU HAVE SERIOUS DIFFICULTY  HEARING: YES
REMEMBERING WHERE THINGS ARE: UNABLE
DAILY_ACTIVITY_CONVERTED_SCORE: 37.26
BASIC_MOBILITY_CONVERTED_SCORE: 41.05
APPLIED_COGNITIVE_CONVERTED_SCORE: 24.98
DO YOU HAVE SERIOUS DIFFICULTY WALKING OR CLIMBING STAIRS: NO
BECAUSE OF A PHYSICAL, MENTAL, OR EMOTIONAL CONDITION, DO YOU HAVE DIFFICULTY DOING ERRANDS ALONE: YES
HELP NEEDED DRESSING REGULAR LOWER BODY CLOTHING: A LOT
HELP NEEDED FOR BATHING: A LITTLE
HELP NEEDED FOR TOILETING: A LOT

## 2021-07-29 ASSESSMENT — PATIENT HEALTH QUESTIONNAIRE - PHQ9
IS PATIENT ABLE TO COMPLETE PHQ2 OR PHQ9: YES
CLINICAL INTERPRETATION OF PHQ9 SCORE: NO FURTHER SCREENING NEEDED
2. FEELING DOWN, DEPRESSED OR HOPELESS: NOT AT ALL
1. LITTLE INTEREST OR PLEASURE IN DOING THINGS: NOT AT ALL
CLINICAL INTERPRETATION OF PHQ2 SCORE: NO FURTHER SCREENING NEEDED
SUM OF ALL RESPONSES TO PHQ9 QUESTIONS 1 AND 2: 0
SUM OF ALL RESPONSES TO PHQ9 QUESTIONS 1 AND 2: 0

## 2021-07-29 ASSESSMENT — ENCOUNTER SYMPTOMS: PAIN SEVERITY NOW: 0

## 2021-07-29 ASSESSMENT — PAIN SCALES - GENERAL
PAINLEVEL_OUTOF10: 0
PAINLEVEL_OUTOF10: 0

## 2021-07-29 ASSESSMENT — PAIN SCALES - WONG BAKER
WONGBAKER_NUMERICALRESPONSE: 0
WONGBAKER_NUMERICALRESPONSE: 0

## 2021-07-29 NOTE — TELEPHONE ENCOUNTER
Left message for Agnieszka Antonio at [facility] to call back to see how the patient is doing today.

## 2021-07-30 LAB
DEPRECATED RDW RBC: 56.9 FL (ref 39–50)
ERYTHROCYTE [DISTWIDTH] IN BLOOD: 15.8 % (ref 11–15)
HCT VFR BLD CALC: 40.2 % (ref 39–51)
HGB BLD-MCNC: 13 G/DL (ref 13–17)
INR PPP: 2
MCH RBC QN AUTO: 31.9 PG (ref 26–34)
MCHC RBC AUTO-ENTMCNC: 32.3 G/DL (ref 32–36.5)
MCV RBC AUTO: 98.8 FL (ref 78–100)
NRBC BLD MANUAL-RTO: 0 /100 WBC
PLATELET # BLD AUTO: 146 K/MCL (ref 140–450)
PROTHROMBIN TIME: 21.1 SEC (ref 9.7–11.8)
RBC # BLD: 4.07 MIL/MCL (ref 4.5–5.9)
SARS-COV-2 RNA RESP QL NAA+PROBE: NOT DETECTED
SERVICE CMNT-IMP: NORMAL
SERVICE CMNT-IMP: NORMAL
WBC # BLD: 9 K/MCL (ref 4.2–11)

## 2021-07-30 PROCEDURE — 85610 PROTHROMBIN TIME: CPT | Performed by: NURSE PRACTITIONER

## 2021-07-30 PROCEDURE — 10004651 HB RX, NO CHARGE ITEM: Performed by: NURSE PRACTITIONER

## 2021-07-30 PROCEDURE — 10002803 HB RX 637: Performed by: NURSE PRACTITIONER

## 2021-07-30 PROCEDURE — 99232 SBSQ HOSP IP/OBS MODERATE 35: CPT | Performed by: INTERNAL MEDICINE

## 2021-07-30 PROCEDURE — 10002800 HB RX 250 W HCPCS: Performed by: INTERNAL MEDICINE

## 2021-07-30 PROCEDURE — 85027 COMPLETE CBC AUTOMATED: CPT | Performed by: NURSE PRACTITIONER

## 2021-07-30 PROCEDURE — 36415 COLL VENOUS BLD VENIPUNCTURE: CPT | Performed by: NURSE PRACTITIONER

## 2021-07-30 PROCEDURE — 10004651 HB RX, NO CHARGE ITEM: Performed by: FAMILY MEDICINE

## 2021-07-30 PROCEDURE — 10006031 HB ROOM CHARGE TELEMETRY

## 2021-07-30 PROCEDURE — 87635 SARS-COV-2 COVID-19 AMP PRB: CPT | Performed by: FAMILY MEDICINE

## 2021-07-30 RX ORDER — WARFARIN SODIUM 1 MG/1
1 TABLET ORAL ONCE
Status: COMPLETED | OUTPATIENT
Start: 2021-07-30 | End: 2021-07-30

## 2021-07-30 RX ORDER — ATORVASTATIN CALCIUM 20 MG/1
20 TABLET, FILM COATED ORAL AT BEDTIME
Status: DISCONTINUED | OUTPATIENT
Start: 2021-07-30 | End: 2021-07-31 | Stop reason: HOSPADM

## 2021-07-30 RX ORDER — CARVEDILOL 3.12 MG/1
3.12 TABLET ORAL EVERY 12 HOURS SCHEDULED
Status: DISCONTINUED | OUTPATIENT
Start: 2021-07-30 | End: 2021-07-31

## 2021-07-30 RX ORDER — ISOSORBIDE MONONITRATE 30 MG/1
60 TABLET, EXTENDED RELEASE ORAL DAILY
Status: DISCONTINUED | OUTPATIENT
Start: 2021-07-30 | End: 2021-07-31 | Stop reason: HOSPADM

## 2021-07-30 RX ORDER — SPIRONOLACTONE 25 MG/1
25 TABLET, FILM COATED ORAL DAILY
Status: DISCONTINUED | OUTPATIENT
Start: 2021-07-31 | End: 2021-07-31 | Stop reason: HOSPADM

## 2021-07-30 RX ORDER — TORSEMIDE 20 MG/1
20 TABLET ORAL DAILY
Status: DISCONTINUED | OUTPATIENT
Start: 2021-07-31 | End: 2021-07-31 | Stop reason: HOSPADM

## 2021-07-30 RX ORDER — HYDRALAZINE HYDROCHLORIDE 50 MG/1
25 TABLET, FILM COATED ORAL 2 TIMES DAILY
Status: DISCONTINUED | OUTPATIENT
Start: 2021-07-30 | End: 2021-07-31 | Stop reason: HOSPADM

## 2021-07-30 RX ADMIN — SODIUM CHLORIDE, PRESERVATIVE FREE 2 ML: 5 INJECTION INTRAVENOUS at 20:29

## 2021-07-30 RX ADMIN — CARVEDILOL 3.12 MG: 3.12 TABLET, FILM COATED ORAL at 20:29

## 2021-07-30 RX ADMIN — ATORVASTATIN CALCIUM 20 MG: 20 TABLET, FILM COATED ORAL at 20:29

## 2021-07-30 RX ADMIN — ASPIRIN 81 MG: 81 TABLET, COATED ORAL at 18:21

## 2021-07-30 RX ADMIN — WARFARIN SODIUM 1 MG: 1 TABLET ORAL at 20:29

## 2021-07-30 RX ADMIN — FUROSEMIDE 40 MG: 10 INJECTION, SOLUTION INTRAVENOUS at 09:56

## 2021-07-30 RX ADMIN — SODIUM CHLORIDE, PRESERVATIVE FREE 2 ML: 5 INJECTION INTRAVENOUS at 10:00

## 2021-07-30 RX ADMIN — HYDRALAZINE HYDROCHLORIDE 25 MG: 50 TABLET, FILM COATED ORAL at 18:20

## 2021-07-30 RX ADMIN — ISOSORBIDE MONONITRATE 60 MG: 30 TABLET, EXTENDED RELEASE ORAL at 18:20

## 2021-07-30 ASSESSMENT — PAIN SCALES - GENERAL
PAINLEVEL_OUTOF10: 0

## 2021-07-31 VITALS
BODY MASS INDEX: 17.37 KG/M2 | DIASTOLIC BLOOD PRESSURE: 70 MMHG | OXYGEN SATURATION: 98 % | RESPIRATION RATE: 14 BRPM | WEIGHT: 117.28 LBS | SYSTOLIC BLOOD PRESSURE: 132 MMHG | HEART RATE: 70 BPM | TEMPERATURE: 96.8 F | HEIGHT: 69 IN

## 2021-07-31 LAB
INR PPP: 1.8
PROTHROMBIN TIME: 18.5 SEC (ref 9.7–11.8)
RAINBOW EXTRA TUBES HOLD SPECIMEN: NORMAL
RAINBOW EXTRA TUBES HOLD SPECIMEN: NORMAL

## 2021-07-31 PROCEDURE — 10004651 HB RX, NO CHARGE ITEM: Performed by: NURSE PRACTITIONER

## 2021-07-31 PROCEDURE — 99232 SBSQ HOSP IP/OBS MODERATE 35: CPT | Performed by: NURSE PRACTITIONER

## 2021-07-31 PROCEDURE — 85610 PROTHROMBIN TIME: CPT | Performed by: NURSE PRACTITIONER

## 2021-07-31 PROCEDURE — 36415 COLL VENOUS BLD VENIPUNCTURE: CPT | Performed by: NURSE PRACTITIONER

## 2021-07-31 PROCEDURE — 10004651 HB RX, NO CHARGE ITEM: Performed by: FAMILY MEDICINE

## 2021-07-31 PROCEDURE — 10002803 HB RX 637: Performed by: NURSE PRACTITIONER

## 2021-07-31 RX ORDER — CARVEDILOL 25 MG/1
12.5 TABLET ORAL 2 TIMES DAILY
Qty: 60 TABLET | Refills: 3 | Status: SHIPPED | OUTPATIENT
Start: 2021-07-31 | End: 2021-07-31 | Stop reason: SDUPTHER

## 2021-07-31 RX ORDER — CARVEDILOL 25 MG/1
12.5 TABLET ORAL 2 TIMES DAILY
Qty: 60 TABLET | Refills: 0 | Status: SHIPPED | OUTPATIENT
Start: 2021-07-31

## 2021-07-31 RX ORDER — WARFARIN SODIUM 1 MG/1
1 TABLET ORAL EVERY EVENING
Status: DISCONTINUED | OUTPATIENT
Start: 2021-07-31 | End: 2021-07-31 | Stop reason: HOSPADM

## 2021-07-31 RX ORDER — CARVEDILOL 25 MG/1
12.5 TABLET ORAL 2 TIMES DAILY
Qty: 60 TABLET | Refills: 0 | Status: SHIPPED | OUTPATIENT
Start: 2021-07-31 | End: 2021-07-31 | Stop reason: SDUPTHER

## 2021-07-31 RX ORDER — CARVEDILOL 12.5 MG/1
12.5 TABLET ORAL EVERY 12 HOURS SCHEDULED
Status: DISCONTINUED | OUTPATIENT
Start: 2021-07-31 | End: 2021-07-31 | Stop reason: HOSPADM

## 2021-07-31 RX ADMIN — SPIRONOLACTONE 25 MG: 25 TABLET, FILM COATED ORAL at 10:24

## 2021-07-31 RX ADMIN — TORSEMIDE 20 MG: 20 TABLET ORAL at 10:25

## 2021-07-31 RX ADMIN — ASPIRIN 81 MG: 81 TABLET, COATED ORAL at 10:24

## 2021-07-31 RX ADMIN — CARVEDILOL 3.12 MG: 3.12 TABLET, FILM COATED ORAL at 10:24

## 2021-07-31 RX ADMIN — HYDRALAZINE HYDROCHLORIDE 25 MG: 50 TABLET, FILM COATED ORAL at 10:25

## 2021-07-31 RX ADMIN — SODIUM CHLORIDE, PRESERVATIVE FREE 2 ML: 5 INJECTION INTRAVENOUS at 10:28

## 2021-07-31 RX ADMIN — ISOSORBIDE MONONITRATE 60 MG: 30 TABLET, EXTENDED RELEASE ORAL at 10:25

## 2021-07-31 ASSESSMENT — PAIN SCALES - PAIN ASSESSMENT IN ADVANCED DEMENTIA (PAINAD)
CONSOLABILITY: NO NEED TO CONSOLE
TOTALSCORE: 0
FACIALEXPRESSION: SMILING OR INEXPRESSIVE
BODYLANGUAGE: RELAXED
BREATHING: NORMAL

## 2021-08-02 NOTE — TELEPHONE ENCOUNTER
spoke to [facility] - --subtherapeutic---pt s/p discharge from [facility]---resume prior dose and recheck 1 week

## 2021-08-02 NOTE — TELEPHONE ENCOUNTER
Jose Garcia a nurse from [facility] called in regards to patients INR.   Jose Garcia states that a nurse came in yesterday from [address] [place] [address] and checked pt's INR    Check on 8/1/21  INR 1.6    With this the nurse directed the nurse at [facility] to ch

## 2021-08-02 NOTE — TELEPHONE ENCOUNTER
Laura Aguirre, please advise. On call fax from 8/1/21 received over the weekend. [facility] calling [phone] Zuri with INR results. I did not see this in the chart. Thank you.

## 2021-08-02 NOTE — TELEPHONE ENCOUNTER
Spoke to Annamarie Man at [facility] I had already spoken to [facility] and corrected the dose to 4 mg daily and 3 mg on Tues;   Recheck INR in 1 week  Annamarie Sample confirmed pt had 4 mg 8/1 and will have 3 mg 8/2

## 2021-08-02 NOTE — TELEPHONE ENCOUNTER
On call; pt was hospitalized at [address] [place] [address] 7/28-7/31; he had fall; no bleed; INR was not elevated     RN called 8/1; INR 1.6; took coumadin 2 mg last night    Advise resume 4 mg nightly and 3 mg on Tues; re-check 8/3; RN called, [address], [phone]

## 2021-08-02 NOTE — TELEPHONE ENCOUNTER
ED update: I spoke with Keri Habermann. She indicates that patient was sent to [facility] for AMS, s/p fall. He was admitted for fluid overload. No FX. + skin tear to shoulder. He was discharged back to [facility] on 7/31.  Per Diego Patton, he is doing wel

## 2021-08-03 ENCOUNTER — ADVANCED DIRECTIVES (OUTPATIENT)
Dept: HEALTH INFORMATION MANAGEMENT | Age: 81
End: 2021-08-03

## 2021-08-09 NOTE — TELEPHONE ENCOUNTER
Please call [address] [facility]  Reporting:  INR 2.2  Please call to advise  Tasked to nursing

## 2021-08-09 NOTE — TELEPHONE ENCOUNTER
Sunrise at [facility] is calling to go over the patient's next steps after receiving an INR of 2.2.      Christopher Menezes #[phone]

## 2021-08-09 NOTE — TELEPHONE ENCOUNTER
[address] notified and they will update [facility] [place]

## 2021-08-10 NOTE — TELEPHONE ENCOUNTER
Spoke with Mauri Melendez at [facility] who is taking care of patient for the day. Reiterated Delia's message and instructions below, he verbalized understanding.

## 2021-08-24 NOTE — TELEPHONE ENCOUNTER
To nursing. Give instructions--coumadin 6 mg today only (if already took coumadin today, take the additional amount so has a total of 6 mg today). Then starting tomorrow 4 mg daily.    Do INR in 2 days on Thursday 8/26/21 and call Thursday for instructi

## 2021-08-24 NOTE — TELEPHONE ENCOUNTER
Spoke with Augusto Montemayor at [facility] and relayed Dr. Roshni Walsh' message. Augusto Montemayor verbalized understanding and was able to repeat back instructions. He states he will notify patient of plan. Anticoag template updated.

## 2021-08-24 NOTE — TELEPHONE ENCOUNTER
Dino pinto [place] at [facility] is calling with PT & INR numbers      Numbers are as follows:    PT  17.5    INR 1.5    Taken this morning 8/24/21 EMS

## 2021-08-24 NOTE — TELEPHONE ENCOUNTER
To MD-on call to advise if any changes to dosing is needed----  Maranda Pagan out of office [facility])  Dr. PORTER out of office [facility])

## 2021-08-26 NOTE — TELEPHONE ENCOUNTER
spoke to [facility] - subtherapeutic---no problems/ bleeds ; pt is done with HH---increased dose and recheck 2 weeks; told Russell Murray INRs should be on Thursdays

## 2021-08-26 NOTE — TELEPHONE ENCOUNTER
Amrit/Domenic Fernandes called with  PT/INR results from today,  8/26    PT 20.1    INR 1.7    [phone]

## 2021-08-31 NOTE — TELEPHONE ENCOUNTER
Melba from [facility] is calling pt. Is out of Warfarin he needs a Rx for Warfarin 4mg  Please send over to [facility] ph.  # [phone]  Routed high to niya Reilly ph. [phone]

## 2021-08-31 NOTE — TELEPHONE ENCOUNTER
Per med module, warfarin 1mg tablets eRxed on 4/16/2021 with 11 refills. I contacted Travon Bucio and spoke to [facility] who informed me that the Rx from 4/16/2021 was discontinued because the directions changed.  Per Delia's AC note from 8/26/2021, patient to take

## 2021-09-01 NOTE — ADDENDUM NOTE
Addended by: Steve Chaparro on: 9/19/2019 11:23 AM     Modules accepted: Wilder Biopsy Type: H and E

## 2021-09-11 NOTE — TELEPHONE ENCOUNTER
Saturday on-call phone call–[place] [facility] [phone]. INR 2.5. Dosage 4 mg every night. I recom same dose and next INR Thursday 9/23/21. Qiana crawley. Routed to [employer] to update flow sheets.

## 2021-09-13 NOTE — TELEPHONE ENCOUNTER
Spoke to [place] - INR reviewed;  [facility] missed the draw date;   CPM and recheck 2 weeks on a Thursday 9/23/21  See [facility] note

## 2021-09-23 NOTE — TELEPHONE ENCOUNTER
Magdalena Cleveland RN given orders;  [place] called---supratherapeutic---no problems/bleeds no sig changes; holdx1 and decrease dose and recheck 1 weeks

## 2021-09-23 NOTE — TELEPHONE ENCOUNTER
Carlitos Gore from [facility] calling to check on the PT INR results left earlier. Carlitos Gore is taking over for Agustin Price and is still looking for a call back today.       # [phone]

## 2021-09-23 NOTE — TELEPHONE ENCOUNTER
Delmi Palm from [facility] calling with PT/INR    PT 45.1  INR 4.1    Dosage: 4mg a night    [facility] [phone]

## 2021-09-27 NOTE — TELEPHONE ENCOUNTER
Corinne Medina from [facility] is calling he tried to fill Jalen's Rx's through [facility] and was told they won't fill anything until they have a medication review with Dr. PORTER   Ph. # [phone]  Routed to Rx

## 2021-09-28 NOTE — TELEPHONE ENCOUNTER
Noted.  I have refilled pt pended meds--Atorvastatin, Torsemide, Spironolactone, Tylenol and Prozac.

## 2021-09-29 NOTE — TELEPHONE ENCOUNTER
Called Robinson doug from [facility] and Left message to call back. Maybe he can provide fax number and once DR. PORTER reviewed medications we can fax medication list to him so  They can be filled

## 2021-10-04 NOTE — TELEPHONE ENCOUNTER
I returned the call to Mavis Gilford at [facility]. Patient has redness in left eye and a bruise below left eye. The patient could not recall a fall or injury. No pain or changes to vision.  Explained that I would relay this to Dr. Elizabeth Fang and call Mavis Gilford back wi

## 2021-10-04 NOTE — TELEPHONE ENCOUNTER
Dr. Cassandra Bullock reviewed and signed refills from [facility] for torsemide and spironolactone to be sent to [facility]. Faxed to [facility] at [phone]. Fax confirmation received. Copy to scanning.

## 2021-10-04 NOTE — TELEPHONE ENCOUNTER
Dr. Rebecca Gonzales reviewed and signed medication list sent by Flo at [facility]. Faxed back to [facility] as requested at [phone]. Fax confirmation received. Copy to scanning.

## 2021-10-04 NOTE — TELEPHONE ENCOUNTER
[facility] from [facility] is calling to report pt. has a redness to his left eye in the sclera area and a bruise below his left eye also. It looks like he bumped into an object, pt doesn't remember bumping into anything.  Pt reports no pain or changes to vis

## 2021-10-04 NOTE — TELEPHONE ENCOUNTER
Telephone call to [facility] and message left. I left a message for the staff at [facility] that the patient's vision is fine and he is having no pain that is okay to observe him at this time.   If the patient does develop pain or has pro

## 2021-10-05 NOTE — TELEPHONE ENCOUNTER
Spoke with Joi White at [facility]. Per yesterday's encounter, RN states, \"Dr. Nicole Williamson reviewed and signed medication list sent by [facility] at [facility]. Faxed back to [facility] as requested at [phone]. Fax confirmation received. Copy to scanning. \"    Med list not yet scanned into Epic. Per Joi White at [facility], patient takes Carvedilol 25 mg one half tablet (12.5 mg) BID. Patient has \"a few\" tablets left. To Brayan Carolina to advise in Dr. Marti begum.

## 2021-10-05 NOTE — TELEPHONE ENCOUNTER
Spoke with Idalia Sandoval at [facility] re: alternate encounter. He mentioned patient's eye abnormality described in this encounter. He did not receive Dr. Yanet Maciel message. Relayed Dr. Yanet Maciel message to him.  Idalia Sandoval verbalized understanding and agre

## 2021-10-08 NOTE — TELEPHONE ENCOUNTER
Discharge reconciliation reviewed in scanning 8/31/2021;   Pt documented as to be taking carvedilol 12.5 mg BID  Rx sent in as MD out of office

## 2021-10-14 NOTE — TELEPHONE ENCOUNTER
[facility] called--therapeutic after high---continue new dose and recheck 2 -3 weeks     2 mg every Mon, Fri; 4 mg all other days

## 2021-10-14 NOTE — TELEPHONE ENCOUNTER
Govind Chavez from [facility] calling checking on status of call back.     Please call back at [phone]

## 2021-10-14 NOTE — TELEPHONE ENCOUNTER
Harriet/Domenic Yost called to report     PT / INR results    23.1    2.0    Call back to [phone], ask to speak with patient's RN

## 2021-10-25 NOTE — TELEPHONE ENCOUNTER
Qiana from [facility] is calling with chest X-ray results she will fax them over ph.  # [phone]  She is aware Dr. Tiffanie Flanagan is off can on-call assist   Routed to clinical

## 2021-10-27 NOTE — TELEPHONE ENCOUNTER
[facility] has an impression of \"mild cardiomegaly with AICD and infiltrate in right lower lung field with possible pleural effusion\".   I spoke to [facility] and have placed the patient on

## 2021-10-27 NOTE — TELEPHONE ENCOUNTER
Please call Sona Hughes at [facility] to advise on orders, after chest xray results are reviewed     [phone]

## 2021-10-29 NOTE — TELEPHONE ENCOUNTER
Madiha Prince / [address] [place] [address] is calling they are requesting the order for Kflex    Fax #[phone]

## 2021-10-29 NOTE — TELEPHONE ENCOUNTER
Harriet/[facility] called  Pharmacy has not received prescription for Kelfex  Please send to [address] [place] [address], #[phone] - option#4   Or fax to [facility] at fax# [phone]    Bessie Champagne can be reached at [phone]

## 2021-10-29 NOTE — TELEPHONE ENCOUNTER
Noted. Rx ordered in system per MD written below: \"Keflex 500 mg orally 4 times daily for 10 days\"    Faxed to [facility]; confirmation received.

## 2021-11-02 NOTE — TELEPHONE ENCOUNTER
Called Anuradha camacho from [facility] who will call back with medications that patient needs to get through [facility] - once he calls back please forward to DR. PORTER

## 2021-11-04 NOTE — TELEPHONE ENCOUNTER
Please call Harriet/Domenic [facility] calling with  INR 6.0  PT: greater than 120.0  Will fax result also  Tasked to [employer]

## 2021-11-04 NOTE — TELEPHONE ENCOUNTER
Alona from [address] [place] [address] for patient family. Family is requesting hospice evaluation.     Best call back number [phone]

## 2021-11-04 NOTE — TELEPHONE ENCOUNTER
Alona called and left detailed voice message stating okay for Hospital evaluation. Alona to call back office for any further questions.

## 2021-11-04 NOTE — TELEPHONE ENCOUNTER
Harriet calling back to inform Mya Bonilla that patient is still taking Ceflex 500mg 4x a day. Patient is doing better.

## 2021-11-04 NOTE — TELEPHONE ENCOUNTER
spoke to Lizeth Rothman--- supratherapeutic -- reported as >6.0 which indicates to Francisco piper and CHONG fingerstick INR; this could be erroneously elevated; confirmed no current bleeding issues--- hold x4 and recheck INR (pt will likely be moving to hospice)

## 2021-11-06 NOTE — TELEPHONE ENCOUNTER
I did not see finasteride on discharge med list in scanning 8/31/21. And isosorbide was listed as 60 mg daily on med list    To Yunier Alexander, can you help with this in MD absence?

## 2021-11-08 NOTE — TELEPHONE ENCOUNTER
For readmission to hospice, okay to discontinue the listed medications. As Coumadin will be discontinued, no further INR checks are necessary.   Please notify hospice team

## 2021-11-08 NOTE — TELEPHONE ENCOUNTER
To  (on call) to please advise on in 's absence (please also see 11/4/21 telephone encounter as patient is to have repeat INR today)

## 2021-11-08 NOTE — TELEPHONE ENCOUNTER
Please call Jasen Morales  She is working on getting patient re-admitted to hospice  Diagnosis: heart failure  Ok to stop the following medications?   Atorvastatin  Folic acid  Coumadin  Tasked to nursing

## 2021-11-10 NOTE — TELEPHONE ENCOUNTER
Please call Melba/[facility] at [phone]  PT greater than 120  INR greater than 5  Dosage:  Was taking 2MG Monday and Thursday,  4MG all other days  Does not see any Coumadin dose  Pt was taking Keflex, has completed   Pt was recently adm

## 2021-11-10 NOTE — TELEPHONE ENCOUNTER
To nursing,   Stop Coumadin and stop checking INRs as was previously instructed. Thanks. Routed to nursing and to Dr. Noy Hutchison.      see note entered by Lela Calderon on 11/8/21–\"For readmission to hospice, okay to discontinue the listed medications.   As Co

## 2021-11-10 NOTE — TELEPHONE ENCOUNTER
Sallie/[facility] will send orders that need to be signed and returned  Will be faxed today  Tasked to nursing

## 2021-11-10 NOTE — TELEPHONE ENCOUNTER
[address] , spoke with Francisco piper and relayed DR. SCALES message - verbalized understanding

## 2021-11-20 ENCOUNTER — TELEPHONE (OUTPATIENT)
Dept: INTERNAL MEDICINE CLINIC | Facility: CLINIC | Age: 81
End: 2021-11-20

## 2021-11-20 NOTE — TELEPHONE ENCOUNTER
On call telephone call received last evening from [facility].   Pt has been in Hospice and he  at 4:00 PM on 2021

## 2021-11-22 ENCOUNTER — TELEPHONE (OUTPATIENT)
Dept: INTERNAL MEDICINE CLINIC | Facility: CLINIC | Age: 81
End: 2021-11-22

## 2021-12-06 NOTE — TELEPHONE ENCOUNTER
Signed HH order faxed back to [address] [place] [address] at [phone]. Sent to scanning.

## 2022-02-14 NOTE — TELEPHONE ENCOUNTER
Discussed with patient's daughter Janette Medina. Patient's daughter relates to me that the patient missed his dose of Coumadin last Saturday and Sunday.   At this point I will not change the dosage of the Coumadin patient will continue the same dosage of the Couma
Need order for INR pt will be coming this Wed
Order for INR entered - called spouse per hipaa and relayed this message - verbalized understanding
Pt went for lab yesterday  Please call daughter with dosage info  Tasked to nursing
See [facility] note  soni sent    Mason Louie,  I saw dad's INR from last week. I have him on my schedule for 11/15/18 at 1:45. I can't remember if that time works for your sister or not. We can keep/change it.     Have a good day,  Brayan Carolina
To Dr. Buzz Samano - yesterday's INR=1.6 - per anti-coag module: pt last INR 10/29/18: INR=1.4 - pt took 5mg Mondays and Fridays, 2.5mg the other days.
Epidural

## 2022-10-18 NOTE — PLAN OF CARE
Called patient to give results. NA/LM    Urine test is normal   Problem: ALTERED NUTRIENT INTAKE - ADULT  Goal: Nutrient intake appropriate for improving, restoring or maintaining nutritional needs  Description: INTERVENTIONS:  - Assess nutritional status and recommend course of action  - Monitor oral intake, labs, a

## 2023-09-29 NOTE — TELEPHONE ENCOUNTER
Discussed with patient. Patient's INR from last night was 9.2. He is already discussed this with Dr. Clifton Banerjee and [employer]. Patient is to continue to hold his Coumadin. He will have a repeat INR with [employer] on Tuesday.   His CBC and BMP were both stable an
Reentered lab orders as they were drawn in error.   Spoke to pallavi WEBB
<--- Click to Launch ICDx for PreOp, PostOp and Procedure

## 2025-03-27 NOTE — PALLIATIVE CARE NOTE
Department of Anesthesiology  Postprocedure Note    Patient: Jaren Jimenez  MRN: [id number]  YOB: 2026  Date of evaluation: 3/27/2025    Procedure Summary       Date: 03/27/25 Room / Location: [address]    Anesthesia Start: 0629 Anesthesia Stop: 0639    Procedure: Myringotomy tube insertion. (Bilateral) Diagnosis:       Dysfunction of both eustachian tubes      Bilateral acute otitis media      (Dysfunction of both eustachian tubes [H69.93])      (Bilateral acute otitis media [H66.93])    Surgeons: Mukesh Steiner MD Responsible Provider: Netta Orosco APRN - CRNA    Anesthesia Type: general ASA Status: 1            Anesthesia Type: No value filed.    Oneal Phase I: Oneal Score: 6    Oneal Phase II:      Anesthesia Post Evaluation    Patient location during evaluation: PACU  Patient participation: complete - patient participated  Level of consciousness: awake  Pain score: 0  Airway patency: patent  Nausea & Vomiting: no nausea and no vomiting  Cardiovascular status: hemodynamically stable  Respiratory status: acceptable and spontaneous ventilation  Hydration status: stable  Comments: Pulse 139   Temp 98.7 °F (37.1 °C) (Temporal)   Resp 24   Wt 9.922 kg (21 lb 14 oz)   SpO2 98%     Pain management: adequate    No notable events documented.   [facility]  Palliative Care Follow Up    Terrell Veloz Patient Status:  Observation    1940 MRN [id number]   Location [facility] 3W/SW Attending Karina Candelaria MD   Hosp Day # 0 PCP Darlin Coffman MD     Date of Consult: Comment:angioedema  Lisinopril                  Comment:Other reaction(s): angioedema    Medications:     Current Facility-Administered Medications:   •  haloperidol lactate (HALDOL) 5 MG/ML injection 2 mg, 2 mg, Intramuscular, Q6H PRN  •  acetaminophen (T 4.40 (H) 01/01/2021    MG 2.2 02/20/2020    PHOS 3.1 01/05/2021    TROP <0.045 01/01/2021       Imaging:  No results found.     Palliative Performance Scale : 50%    Advance Directives:  Have advanced directives been discussed with patient or healthcare pow Also communicated with Dr. Shalini Veliz and RN Maryanne Caballero through [facility] Thank you for allowing the Palliative Care Services team to participate in the care of your patient. I will continue to follow clinically.     Josue [facility], [facility], [place], [id number]  1/5

## (undated) NOTE — Clinical Note
MINERVA, TCM appt 3/7/2018. TCM outreach completed. Dtr awaiting Dr. Perez Files response with regards to dosage clarification for Furosemide and Prednisone.

## (undated) NOTE — Clinical Note
FYI: TCM outreach completed. Pt has appt on 3-20-20, tried to reschedule, daughter declined. She is concerned re: pt's depression. Sent acute TE to PCP office.

## (undated) NOTE — MR AVS SNAPSHOT
[facility] [address] [place] [address]  [phone]               Thank you for choosing us for your health care visit with Silvia Nickerson MD.  We are glad to serve you and happy to provide you with this summary of your visit.   Pl 1.  Patient is to continue his current diet, medications and activity. 2.  Patient will have blood test taken today that were previously ordered. 3.  I will plan to see the patient back in 3 months.        Allergies as of Apr 19, 2017     Losartan Sami x 1 day, take 2 tabs x 1 day, take 1 tab x 1 day, then off   What changed:  additional instructions   Commonly known as:  DELTASONE           spironolactone 25 MG Tabs   Take 25 mg by mouth daily.    Commonly known as:  ALDACTONE           Warfarin Sodium 5 Water is best for hydration Fast food. Eat at home when possible     Tips for increasing your physical activity – Adults who are physically active are less likely to develop some chronic diseases than adults who are inactive.      HOW TO GET STARTED: HOW

## (undated) NOTE — LETTER
Hospital Discharge Documentation  Patient was discharged to [facility] in [place] with [address]    From: [address] Hospitalist's Office  Phone: [phone]    Patient discharged time/date: 2/20/2020  2:29 PM  Patient discharge disposition:  SNF respiratory status improved significantly. At this time, the patient has been deemed stable to be discharged home. He will get 4 more doses of Tamiflu. He received IV Lasix, and currently he will resume his torsemide and spironolactone.   The patient krish

## (undated) NOTE — MR AVS SNAPSHOT
ALISIA Orting  [facility] [address] [place] [address]  [phone]               Thank you for choosing us for your health care visit with Junior Dale MD.  We are glad to serve you and happy to provide you with this summary of your visit.   [address] [address]   [phone]              Allergies as of Feb 13, 2017     Losartan Swelling    Pt developed angioedema of his mouth.     Lisinopril     Other reaction(s): angioedema                Today's Vital Signs     BP Pulse Temp Height Weigh [D69.6], Anemia in CKD (chronic kidney disease) [N18.9, D63.1]           Basic Metabolic Panel (8) [E]    Complete by:  Feb 13, 2017 (Approximate)    Assoc Dx:   Fatigue, unspecified type [R53.83], Renal insufficiency [N28.9]           Lipid Panel [E]    Co

## (undated) NOTE — IP AVS SNAPSHOT
[facility]            (For Outpatient Use Only) Initial Admit Date: 1/30/2020   Inpt/Obs Admit Date: Inpt: N/A / Obs: 01/30/20   Discharge Date:    Hospital Acct:  [de-identified]   MRN: [de-identified]   CSN: [id number]   CEID: [id number]        EN Group Number:  Insurance Type:    Subscriber Name:  Subscriber :    Subscriber ID:  Pt Rel to Subscriber:    Hospital Account Financial Class: Medicare    2020

## (undated) NOTE — LETTER
Hospital Discharge Documentation  Please phone to schedule a hospital follow up appointment.     From: [address] Hospitalist's Office  Phone: [phone]  Patient discharged time/date: 2/5/2020  5:41 PM  Patient discharge disposition:  Assisted Living failure, mild acute exacerbation. EF 25%.  Moderate R sided effusion.    -received lasix 20mg IV yesterday - resume home diuretics on DC  -pulmonary to see regarding pleural effusion.  - s/p thoracentesis on 2/2 with 1500cc/hr      Chronic kidney disease st intracranial abnormality.    Dictated by (CST): Tamia Parada MD on 1/30/2020 at 15:24     Approved by (CST): Tamia Parada MD on 1/30/2020 at 15:26          Ct Spine Cervical (cpt=72125)    Result Date: 1/30/2020  CONCLUSION:   Degenerative disc, facet, unco Us Thoracentesis Guided Right (cpt=32555)    Result Date: 2/2/2020  CONCLUSION: Sonographic guidance for thoracentesis.      Dictated by (CST): Etta Cancino MD on 2/02/2020 at 15:05     Approved by (CST): Etta Cancino MD on 2/02/2020 at 15:06 < > = values in this interval not displayed.      Lab Results   Component Value Date    PT 49.6 (H) 09/29/2016    PT 15.1 (H) 08/02/2016    PT 16.3 (H) 07/12/2016    INR 1.74 (H) 02/04/2020    INR 1.84 (H) 02/03/2020    INR 2.25 (H) 02/02/2020       Discha Commonly known as:  ALDACTONE      Take 25 mg by mouth daily.    Refills:  0     torsemide 20 MG Tabs  Commonly known as:  DEMADEX      Take 20mg by mouth alternating with 10mg every other day   Quantity:  30 tablet  Refills:  0     Warfarin Sodium 2 MG Tab  1940 MRN [id number]   Location [facility] 3W/SW Attending Sondra Hurst MD   Hosp Day # 0 PCP Rosalia Bhat MD     Date of Admission: 2020     Date of Discharge: 20    Hospital Discharge Diagnoses: pleural effusion, fal Result Value Ref Range    Body Fluid Culture Result No Growth 1 Day N/A    Body Fld Smear This is a cytocentrifuged smear.  N/A    Body Fld Smear 3+ WBCs seen N/A    Body Fld Smear No organisms seen N/A       IMAGING STUDIES: SOME MAY NEED FOLLOW UP WITH PC 1/31/2020 at 13:27          Xr Chest Ap Portable  (cpt=71045)    Result Date: 2/2/2020  CONCLUSION: Decreased small right pleural effusion. No definite pneumothorax. No other significant interval change.     Dictated by (CST): Olga Ramsey MD on 2/02 T4F 1.2 01/31/2020    TSH 10.800 (H) 01/31/2020    PSA 0.4 12/02/2017    ESRML 50 (H) 12/14/2019    CRP 2.05 (H) 12/14/2019    MG 2.0 01/31/2020    PHOS 3.3 01/30/2020    TROP <0.045 01/30/2020     01/31/2020       Recent Labs   Lab 02/01/20  0545 0 Commonly known as:  COREG      Take 1 tablet (25 mg total) by mouth 2 (two) times daily with meals.  take 1 tablet (25MG)  by oral route 2 times every day with food   Quantity:  1 tablet  Refills:  0     finasteride 5 MG Tabs  Commonly known as:  PROSCAR Fermin Dela Cruz MD Consulting Physician  Cardiac Electrophysiology                Other Discharge Instructions:       Discharge Instructions       Home health pt/ot/rn  Blood draw: INR in 3-4 days, coumadin dosing per results         ------------------------

## (undated) NOTE — LETTER
Etienne Jackson  4/11/1940    A patient of your clinic was recently seen by the hospitalists at [facility], and will be following up with Home health pt/ot/rn, Blood draw: INR in 3-4 days, coumadin dosing per results     The patient's last

## (undated) NOTE — IP AVS SNAPSHOT
[facility]            (For Outpatient Use Only) Initial Admit Date: 1/1/2021   Inpt/Obs Admit Date: Inpt: N/A / Obs: 01/01/21   Discharge Date:    Xu Resendez:  [de-identified]   MRN: [de-identified]   CSN: [id number]   CEID: [id number]        QZF Subscriber ID: [id number] Pt Rel to Subscriber: SELF   TERTIARY INSURANCE   Payor:  Plan:    Group Number:  Insurance Type:    Subscriber Name:  Subscriber :    Subscriber ID:  Pt Rel to Subscriber:    Hospital Account Financial Class: Medicare    Ad

## (undated) NOTE — MR AVS SNAPSHOT
[address]  [phone]               Thank you for choosing us for your health care visit with Baldemar Gaston.  MD Ingris.  We are glad to serve you and happy to provide you with this summar * carvedilol 25 MG Tabs   Take  by mouth. take 1 tablet (25MG)  by oral route 2 times every day with food   Commonly known as:  COREG           * carvedilol 25 MG Tabs   Take by mouth.    Commonly known as:  COREG           * finasteride 5 MG Tabs   Take b Take 6 tabs x 1 day, take 5 tabs x 1 day, take 4 tabs x 1 day, take 3 tabs x 1 day, take 2 tabs x 1 day, take 1 tab x 1 day, then off   What changed:  additional instructions   Commonly known as:  DELTASONE           spironolactone 25 MG Tabs   Take 25 mg

## (undated) NOTE — LETTER
Donis Abdalla  4/11/1940    A patient of your clinic was recently seen by the hospitalists at [facility], and will be following up with you on INR Friday 3/3/18 and Monday 3/5/18. Pt followed by [facility].        The patient's last IN

## (undated) NOTE — LETTER
07/30/19        [address]      Dear Sana Notice records indicate that you have outstanding lab work and or testing that was ordered for you and has not yet been completed:  Orders Placed This Encou

## (undated) NOTE — LETTER
Hospital Discharge Documentation  Patient was discharged to [facility] with residential hospice services.      From: [address] Hospitalist's Office  Phone: [phone]    Patient discharged time/date: 1/5/2021  4:41 PM  Patient discharge disposition:  [facility] HPI Per Admitting Physician: This is a 78year oldmale who was sent in from nursing home due to worsening back pain. History limited from patient due to mental status changes.   At time of interview, patient remains fairly without complaints, but is interm # Permanent atrial fibrillation on warfarin  - paced rhythm  - INR 4.6->2.7, resume warfarin[CY. 1] at discharge[CY. 2]     # SARAH on CKD stage III  - Appreciate Nephro consultation[CY. 1]. N[CY. 2]ephro signed off 1/5  - Cr 2, up from baseline 1.3-1.4  - Yamileth CONCLUSION:  1. Moderate right pleural effusion and diffuse opacification of the right lung. Similar findings were present on previous chest x-ray.     Dictated by (CST): Ramo Dobbs MD on 1/01/2021 at 8:24 AM     Finalized by (CST): Florina Main RDW 15.9* 16.2* 16.2* 16.2* 16.3*   NEPRELIM 7.30 9.26*  --   --   --    WBC 8.4 10.5 9.3 7.2 7.1   .0* 119.0* 114.0* 107.0* 118.0*     Recent Labs   Lab 01/03/21  0811 01/04/21  0724 01/05/21  0802   GLU 96 95 88   BUN 63* 61* 62*   CREATSERUM 2.02 TAKE 1 TABLET (5 MG TOTAL) BY MOUTH DAILY. Quantity: 90 tablet  Refills: 0     FLUoxetine HCl 20 MG Caps  Commonly known as: PROzac      Take 1 capsule (20 mg total) by mouth daily.    Quantity: 90 capsule  Refills: 3     folic acid 1 MG Tabs  Commonly k

## (undated) NOTE — IP AVS SNAPSHOT
Patient Demographics     Address  [address] [place] [address] Alice Lamar [address] [address] Phone  [phone] [facility])  [phone] (Work)  [phone] (Mobile) *Preferred* E-mail Address  [email or web address]      Emergency Contact(s)     Name Relation Home Work [employer] Next dose due: Tomorrow morning      TAKE ONE TABLET BY MOUTH ONE TIME DAILY   Nadia Davis MD         hydrALAzine HCl 25 MG Tabs  Commonly known as:  APRESOLINE  Next dose due:   Tonight at 2100      [address] [facility] [id number] FLUoxetine HCl (PROZAC) cap 20 mg 02/05/20 0821 Given      [id number] Isosorbide Mononitrate ER (IMDUR) 24 hr tab 60 mg 02/05/20 0822 Given      [id number] Warfarin Sodium (COUMADIN) tab 2 mg 02/04/20 2111 Given      [id number] carvedilol (COREG) t Quantiferon TB Result Indeterminate Negative — [facility]   Comment:         Interpretation: Results are INDETERMINATE for TB Antigen responsiveness. Low mitogen response may occur with impaired immune function.   A high value in the Nil control may be d Recommended therapeutic ranges for anticoagulant therapy are   as follows:   2.0 - 3.0 All indications except for mechanical prosthetic   cardiac valves. 2.5 - 3.5 Mechanical prosthetic cardiac valves.               Testing Performed By     [facility] found him stuck in his bathtub covered with stool. He has dementia and says he fell, and he currently has no complaints except back pain. He is on Coumadin and has some difficulties with history because he is demented.   He denies having any chest pain, s VITAL SIGNS:  Temperature 98.2, pulse 81, respiratory rate 18, blood pressure 147/65, saturation 97%. HEENT:  Normocephalic, atraumatic. Anicteric. Oropharynx is dry.     BACK:  He has bruises and significant dorsal spine tenderness in the thoracic and l :  Mere Elizabeth MD (Physician)       [facility]    Consult Note    Date:  2/1/2020  Date of Admission:  1/30/2020      Chief Complaint:   Josephine Lemos is a(n) 78year old male with pleural effusion.     HPI:   The patient has Social History: , 5 children, quit tobacco 20 years ago after 10 years of use, occasional beer, retired from sales  Social History    Tobacco Use      Smoking status: Former Smoker        Packs/day: 1.50        Years: 30.00        Pack years: 39 Review of Systems:  Vision normal. Ear nose and throat normal. Bowel normal. Bladder function normal. No depression. No thyroid disease. No rash. Muscles and joints remarkable for arthritis. No weight loss no weight gain.     Physical Exam:   Vital Signs: 2.  Atrial fibrillation with cardiomyopathy–as per cardiology    3. DVT prophylaxis– currently anticoagulated with Coumadin    4. OBS    5. CODE STATUS– DNR    I am delighted to assist with Eclectic Ebbing care.         Adriana Grady MD  Medical Director, C at this time.       DISCHARGE DX:Status post fall.  No rhabdomyolysis.  PT OT rec rehab, SW helping     Dementia.  Patient is significantly confused.     Dilated nonischemic cardiomyopathy with chronic systolic congestive heart failure, mild acute exacerba Dictated by (CST): Luis Pulliam MD on 1/30/2020 at 23:34     Approved by (CST): Luis Pulliam MD on 1/30/2020 at 23:37          Ct Brain Or Head (25230)    Result Date: 1/30/2020  CONCLUSION:  Mild chronic microvascular ischemic disease with CONCLUSION:  1. Mild CHF with mild pulmonary interstitial edema and right basilar pleural effusion.     Dictated by (CST): Diallo Lamb MD on 1/30/2020 at 13:37     Approved by (CST): Gerard Luu MD on 1/30/2020 at 13:39          Us T    < > 104 101 100   CO2 27.0   < > 30.0 30.0 30.0   ALKPHO 75  --   --   --   --    AST 14*  --   --   --   --    ALT 14*  --   --   --   --    BILT 2.1*  --   --   --   --    TP 5.7*  --   --   --   --     < > = values in this interval not displaye Refills:  0     methotrexate 2.5 MG Tabs  Commonly known as:  RHEUMATREX      TAKE 2 TABLETS (5 MG TOTAL) BY MOUTH ONCE WEEKLY   Quantity:  26 tablet  Refills:  3     spironolactone 25 MG Tabs  Commonly known as:  ALDACTONE      Take 25 mg by mouth daily. Related Notes:  Addendum by Ranjan Sher MD (Physician) filed at 2020  9:11 PM         [facility]    Discharge Summary    Yue Cris Patient Status:  Observation    1940 MRN [id number]   Location [address] MARILEE assisting     CULTURE:   Hospital Encounter on 01/30/20   1.  BODY FLUID CULT,AEROBIC AND ANAEROBIC     Status: None (Preliminary result)    Collection Time: 02/02/20  2:34 PM   Result Value Ref Range    Body Fluid Culture Result No Growth 1 Day N/A    B Result Date: 1/31/2020  CONCLUSION: Normal examination. Dictated by (CST): Sabrina Weiner MD on 1/31/2020 at 13:27     Approved by (CST):  Sabrina Weiner MD on 1/31/2020 at 13:27          Xr Chest Ap Portable  (cpt=71045)    Result Date: 2/2/2020 BILT 2.1 (H) 01/31/2020    TP 5.7 (L) 01/31/2020    AST 14 (L) 01/31/2020    ALT 14 (L) 01/31/2020    PTT 27.5 08/02/2016    INR 1.74 (H) 02/04/2020    PT 49.6 (H) 09/29/2016    T4F 1.2 01/31/2020    TSH 10.800 (H) 01/31/2020    PSA 0.4 12/02/2017    ESRM Take 1 capsule (10 mg total) by mouth daily.    Quantity:  90 capsule  Refills:  3        CONTINUE taking these medications      Instructions Prescription details   carvedilol 25 MG Tabs  Commonly known as:  COREG      Take 1 tablet (25 mg total) by mouth [address] [address]  [phone]    In 2 weeks        Consultants     Provider Role Specialty    January Lopez MD Consulting Physician  PULMONARY DISEASES    Shera Simmonds, MD Consulting Physician  Cardiac Electrophysiology                Other Discharge Occupational Therapy Note signed by Ana Samaniego OT at 2/4/2020  2:57 PM  Version 1 of 1    Author:  Ana Samaniego OT Service:  — Author Type:  Occupational Therapist    Filed:  2/4/2020  2:57 PM Date of Service:  2/4/2020  2:55 PM Status:  Signed    Edit signs of aspiration on any swallows of solid nor thin liquids (no coughing, no throat clearing and clear vocal quality). Collaborated with RN regarding Pt's swallowing plan of care. RN reported Pt with good tolerance of current diet.  No report of difficult % of the time across 1-2 sessions. Pt seen upright in bed self-feeding oral trials. Swallowing precautions/strategies discussed and demonstrated. Pt with good understanding and use of swallowing precautions.      GOAL MET           FOLLOW UP  Follow Up N - See additional Care Plan goals for specific interventions

## (undated) NOTE — LETTER
Hospital Discharge Documentation  Please phone to schedule a hospital follow up appointment.     From: [address] Hospitalist's Office  Phone: [phone]    Patient discharged time/date: 6/22/2018  3:31 PM  Patient discharge disposition:  [address] follow-up as an outpatient for further monitoring. He is being set up with home health services as he is a bit weekend. 24 hour supervision was recommended by physical therapy and the patient's family is going to arrange for this.   Will have a follow-up Quantity:  1 tablet  Refills:  0     finasteride 5 MG Tabs  Commonly known as:  PROSCAR      TAKE 1 TABLET (5 MG TOTAL) BY MOUTH DAILY.    Quantity:  30 tablet  Refills:  11     FLUoxetine HCl 10 MG Caps  Commonly known as:  PROZAC      TAKE 1 CAPSULE BY M Deardipesh Brar MD[JH.1]      Electronically signed by Ricky Bustillos MD on 6/22/2018  3:06 PM   [facility]. 1 Gris Carter MD on 6/22/2018  3:02 PM

## (undated) NOTE — LETTER
Agata Doctor  4/11/1940    A patient of your clinic was recently seen by the hospitalists at [facility], and will be following up with [facility] on 1/7/2020 for INR follow up .      The patient's last INR values were, as follows

## (undated) NOTE — MR AVS SNAPSHOT
[facility]  [address] [place] [address]               Thank you for choosing us for your health care visit with Paola Villalta MD.  We are glad to serve you and happy to provide you with this summary This list is accurate as of: 1/3/17 11:13 AM.  Always use your most recent med list.                carvedilol 25 MG Tabs   Take  by mouth.  take 1 tablet (25MG)  by oral route 2 times every day with food   Commonly known as:  COREG           finaste Follow-up Instructions     Return in about 3 months (around 4/3/2017).          Referral Information     Referral Order Referred to Address [facility] Phone Visits Status Diagnosis                 MyChart     Visit MyChart  You can access your MyChart to more act

## (undated) NOTE — MR AVS SNAPSHOT
[facility] [address]  [phone]               Thank you for choosing us for your health care visit with Manuelito Rojo DPM.  We are glad to serve you and happy to provide you with this summ Onychomycosis          Instructions and Information about Your Health     None      Allergies as of Jun 22, 2017     Losartan Swelling    Pt developed angioedema of his mouth.     Lisinopril     Other reaction(s): angioedema                   Current Medic What changed:  Another medication with the same name was changed. Make sure you understand how and when to take each.    Commonly known as:  DELTASONE           * predniSONE 1 MG Tabs   TAKE 2 TABLETS BY MOUTH EVERY MORNING   What changed:  Another medicati

## (undated) NOTE — LETTER
Hospital Discharge Documentation  Please phone to schedule a hospital follow up appointment. Pt will be followed by [facility]. No discharge summary available at this time, below is the most recent progress note and dc instructions for your review . anicteric. Pupils were equal.  Ears: There were no lesions. Nose:  No lesions were noted. NECK:  Supple. There was no JVD. CVS:  S1S2 RRR no murmurs  LUNGS:  No audible wheezing  ABDOMEN: Non-distended  MUSCULOSKELETAL:  There was no deformity. -hold arb for now- restart after heart failure clinic f/u  -await echo-unchanged, ef 20%     Influenza B  -cont tamiflu  -cont prednisone  -cont inhalers     CKD- 3  -repeat labs as OP     Hyponatremia  -improved     Anemia  -chronic   -at baseline     Dis

## (undated) NOTE — IP AVS SNAPSHOT
Patient Demographics     Address  [address] [place] [address] Alice Lamar [address] [address] Phone  [phone] [facility])  [phone] (Work)  [phone] (Mobile) *Preferred* E-mail Address  [email or web address]      Emergency Contact(s)     Name Relation Home Work [employer] TAKE ONE TABLET BY MOUTH ONE TIME DAILY   Marcio Tanner MD         hydrALAzine HCl 25 MG Tabs  Commonly known as: APRESOLINE  Next dose due:  Tonight      TAKE ONE TABLET BY MOUTH TWICE DAILY   Houston Davies MD         Isosorbide Mononitrate 27 M [id number] finasteride (PROSCAR) tab 5 mg 01/05/21 0923 Given      [id number] folic acid (FOLVITE) tab 1 mg 01/05/21 0924 Given      [id number] hydrALAzine HCl (APRESOLINE) tab 25 mg 01/04/21 2116 Given      [id number] hydrALAzine HCl (APRESOLINE) tab 25 mg 0 PROTHROMBIN TIME (PT) [[id number]] (Abnormal)  Resulted: 01/05/21 0831, Result status: Final result   Ordering provider: Mercy Garcia MD  01/04/21 2300 Resulting lab: [facility] LAB [facility])    Specimen Information    Type Source [facility] - Abbreviation Name Director Address Valid Date Range    [address] [facility]) [facility]) Regan Lane M.D. [address] [address] 03/19/20 1442 - Present            Microbiology Results (All Past Medical History:   Diagnosis Date   • Anemia    • Cardiomyopathy ([facility] [address]    • CHF (congestive heart failure) (HCC)    • Congestive heart disease (HCC)    • COPD (chronic obstructive pulmonary disease) ([facility] [address]    • Encounter for long-term (current) use of Discontinued Medications    No medications on file       SOCIAL HISTORY  Social History    Socioeconomic History      Marital status:        Spouse name: Not on file      Number of children: Not on file      Years of education: Not on file      [facility] EXTREMITIES: No edema appreciated  NEUROLOGICAL:[TS.1] AAO x1, person (baseline per daughter at bedside) following commands, generalized weakness, but[TS.2] no new focal deficit.     SKIN:  Warm and well perfused  PSYCHIATRIC: Normal mood      IMAGING  Xr C - Cont to monitor        HTN  - controlled  -Meds  - Monitor and adjust as needed[TS.2]      Congestive heart failure (HCC)[TS.1]  Does not appear to be in acute exacerbation  Patient seems mildly hypovolemic on exam  Holding diuresis at this time  Cardiol Reason for Consultation:   SARAH     History of Present Illness:   Patient is a 78year old male who was admitted to the hospital for Back pain without radiation: Unable to provide history given dementia . Most of the information obtained from EHR . [RM. 1] Se •  lidocaine-menthol 4-1 % 1 patch, 1 patch, Transdermal, Daily    •  atorvastatin (LIPITOR) tab 20 mg, 20 mg, Oral, Nightly    •  carvedilol (COREG) tab 25 mg, 25 mg, Oral, BID with meals    •  finasteride (PROSCAR) tab 5 mg, 5 mg, Oral, Daily    •  folic •  spironolactone (ALDACTONE) 25 MG Oral Tab, Take 25 mg by mouth daily. •  ISOSORBIDE MONONITRATE 30 MG OR TB24, Take 60 mg by mouth daily. •  FLUoxetine HCl (PROZAC) 20 MG Oral Cap, Take 1 capsule (20 mg total) by mouth daily.         Allergies TROP <0.045 01/01/2021     01/31/2020         Imaging:  Xr Chest Pa + Lat Chest (cpt=71046)    Result Date: 1/1/2021  CONCLUSION:  1. Moderate right pleural effusion and diffuse opacification of the right lung.   Similar findings were present on prev RM.2 - Alessia Askew MD on 1/2/2021  7:44 PM  RM. 3 - Alessia Askew MD on 1/2/2021 10:33 AM                     D/C Summary    No notes of this type exist for this encounter.      Imaging Results (HF patients)    Chest X-Ray Results (HF patients only Presenting Problem: Back pain[DP.2]    Problem List[DP.1]  Principal Problem:    Back pain without radiation  Active Problems:    CKD (chronic kidney disease), stage III    Cardiomyopathy ([address]    ICD (implantable cardioverter-defibrillator) in place    Hy PT Treatment Plan: Bed mobility; Energy conservation;Patient education;Gait training;Transfer training;Balance training[DP. 2]    SUBJECTIVE  \"I am ok\"    OBJECTIVE[DP.1]  Precautions: Low vision;Hard of hearing;Bed/chair alarm[DP.2]    WEIGHT BEARING REST Patient End of Session: Up in chair;Needs met;Call light within reach;RN aware of session/findings; All patient questions and concerns addressed; Alarm set[DP. 2]    CURRENT GOALS     Goals to be met by: 1/16/2021  Patient Goal Patient's self-stated goal is: Pleural effusion[DP.2]      PHYSICAL THERAPY ASSESSMENT   Patient seen QD for therapy treatment. RN approved for therapy treatment and patient agreeable for therapy treatment. Patient presently confused but able to follow single commend.  PPE's donned by [employer]                         O2 WALK                  AM-PAC '6-Clicks' INPATIENT SHORT FORM - BASIC MOBILITY  How much difficulty does the patient currently have. ..[DP.1]  -   Turning over in bed (including adjusting bedclothes, sheets and b Goal #5 Patient to demonstrate independence with home activity/exercise instructions provided to patient in preparation for discharge. Goal #5   Current Status In Progress   Goal #6    Goal #6  Current Status[DP.1]         Attribution Diamond    DP. 1 - Russel Booth

## (undated) NOTE — MR AVS SNAPSHOT
ALISIA Nara Visa  [facility] [address] [address]  [phone]               Thank you for choosing us for your health care visit with Edilma Mcmahan MD.  We are glad to serve you and happy to provide you with this summary of your visit.   Pl Allergies as of Jan 12, 2017     Lisinopril     Other reaction(s): angioedema                Today's Vital Signs     BP Pulse Temp Weight          120/70 mmHg 68 97.5 °F (36.4 °C) 168 lb (76.204 kg)           Current Medications          This list is accur Return in about 1 month (around 2/12/2017) for Office visit. MyChart     Visit Toxic [employer]  You can access your MyChart to more actively manage your health care and view more details from this visit by going to [email or web address].   If you've

## (undated) NOTE — Clinical Note
FYI: TCM outreach completed. Scheduled TCM/HFU for 2-10-20. Also sent TE re: Prozac dosing, daughter states pt should be taking 20 mg daily and he's being given 10 mg daily at [place] [id number] (what he was given in the hospital per daughter).

## (undated) NOTE — LETTER
[facility], [place]  Authorization for Invasive Procedures  1.  I hereby authorize Dr. Sena Andres , my physician and whomever may be designated as the doctor's assistant, to perform the following operation and/or procedure:  Ultrasound performed for the purposes of advancing medicine, science, and/or education, provided my identity is not revealed. If the procedure has been videotaped, the physician/surgeon will obtain the original videotape.  The hospital will not be responsible for stor My signature below affirms that prior to the time of the procedure, I have explained to the patient and/or his legal representative, the risks and benefits involved in the proposed treatment and any reasonable alternative to the proposed treatment.  I have

## (undated) NOTE — MR AVS SNAPSHOT
[facility]   2017 2:00 PM   Office Visit   MRN:  [id number]    Description:  Male : 1940   Provider:  Starr Breaux   Department:  [facility] AND [facility] Hematology Oncology              Visit Summary      Primary Visit Diagnosis Please Note     Please keep your updated medication list with you to share with other healthcare providers. At [facility] we continue to have an Open Medical Record policy.   We can provide you with your current medication list and lab re